# Patient Record
Sex: MALE | Race: WHITE | Employment: OTHER | URBAN - METROPOLITAN AREA
[De-identification: names, ages, dates, MRNs, and addresses within clinical notes are randomized per-mention and may not be internally consistent; named-entity substitution may affect disease eponyms.]

---

## 2017-02-08 DIAGNOSIS — E11.9 TYPE 2 DIABETES MELLITUS WITHOUT COMPLICATION (H): Primary | ICD-10-CM

## 2017-02-09 NOTE — TELEPHONE ENCOUNTER
METFORMIN 1000MG TABLETS         Last Written Prescription Date: 3/30/2016  Last Fill Quantity: 180, # refills: 1  Last Office Visit with [facility], [facility] or [facility] prescribing provider:  6/22/2016        BP Readings from Last 3 Encounters:   06/22/16 148/98   01/18/16 158/84   01/06/16 132/86     MICROL       15   6/22/2016  No results found for this basename: microalbumin  CREATININE   Date Value Ref Range Status   06/23/2016 1.10 0.66 - 1.25 mg/dL Final   ]  GFR ESTIMATE   Date Value Ref Range Status   06/23/2016 68 >60 mL/min/1.7m2 Final   01/04/2016 >90 >60 mL/min/1.7m2 Final   02/26/2015 >90  Non  GFR Calc   >60 mL/min/1.7m2 Final     GFR ESTIMATE IF BLACK   Date Value Ref Range Status   06/23/2016 82 >60 mL/min/1.7m2 Final   01/04/2016 >90 >60 mL/min/1.7m2 Final   02/26/2015 >90   GFR Calc   >60 mL/min/1.7m2 Final     CHOL      159   6/23/2016  HDL       44   6/23/2016  LDL       91   6/23/2016  LDL      102   1/7/2013  TRIG      119   6/23/2016  CHOLHDLRATIO      3.4   8/18/2014  AST       39   6/23/2016  ALT       45   6/23/2016  A1C      8.3   6/23/2016  A1C      8.4   1/4/2016  A1C      9.6   2/26/2015  A1C      8.1   8/18/2014  A1C      9.2   2/5/2014  POTASSIUM   Date Value Ref Range Status   06/23/2016 4.5 3.4 - 5.3 mmol/L Final

## 2017-02-13 ENCOUNTER — HOSPITAL ENCOUNTER (OUTPATIENT)
Dept: CARDIOLOGY | Facility: CLINIC | Age: 62
Discharge: HOME | End: 2017-02-13
Attending: NURSE PRACTITIONER | Admitting: NURSE PRACTITIONER
Payer: COMMERCIAL

## 2017-02-13 DIAGNOSIS — I48.91 ATRIAL FIBRILLATION WITH RVR (H): ICD-10-CM

## 2017-02-13 PROCEDURE — 25500064 ZZH RX 255 OP 636: Performed by: NURSE PRACTITIONER

## 2017-02-13 PROCEDURE — 93306 TTE W/DOPPLER COMPLETE: CPT | Mod: 26 | Performed by: INTERNAL MEDICINE

## 2017-02-13 PROCEDURE — 40000264 ECHO COMPLETE WITH LUMASON

## 2017-02-13 RX ADMIN — SULFUR HEXAFLUORIDE 5 ML: KIT at 16:49

## 2017-02-13 NOTE — TELEPHONE ENCOUNTER
Prescription approved per [facility] Refill Protocol.  Giuliana Jackson RN- Triage [employer]

## 2017-02-15 ENCOUNTER — OFFICE VISIT (OUTPATIENT)
Dept: CARDIOLOGY | Facility: CLINIC | Age: 62
End: 2017-02-15
Attending: NURSE PRACTITIONER
Payer: COMMERCIAL

## 2017-02-15 VITALS
WEIGHT: 315 LBS | BODY MASS INDEX: 46.65 KG/M2 | HEIGHT: 69 IN | DIASTOLIC BLOOD PRESSURE: 90 MMHG | SYSTOLIC BLOOD PRESSURE: 132 MMHG | HEART RATE: 86 BPM

## 2017-02-15 DIAGNOSIS — I48.91 ATRIAL FIBRILLATION WITH RVR (H): ICD-10-CM

## 2017-02-15 PROCEDURE — 99214 OFFICE O/P EST MOD 30 MIN: CPT | Mod: 25 | Performed by: INTERNAL MEDICINE

## 2017-02-15 PROCEDURE — 93000 ELECTROCARDIOGRAM COMPLETE: CPT | Performed by: INTERNAL MEDICINE

## 2017-02-15 NOTE — MR AVS SNAPSHOT
After Visit Summary   2/15/2017    Demetri Booth    MRN: [id number]           Patient Information     Date Of Birth          1955        Visit Information        Provider Department      2/15/2017 4:15 PM Chelsea Bird MD [facility] [place]        Today's Diagnoses     Atrial fibrillation with RVR           Follow-ups after your visit        Additional Services     Follow-Up with Cardiac Advanced Practice Provider                 Future tests that were ordered for you today     Open Future Orders        Priority Expected Expires Ordered    Follow-Up with Cardiac Advanced Practice Provider Routine 2/15/2018 6/30/2018 2/15/2017            Who to contact     If you have questions or need follow up information about today's clinic visit or your schedule please contact [facility] directly at [phone].  Normal or non-critical lab and imaging results will be communicated to you by [employer], letter or phone within 4 business days after the clinic has received the results. If you do not hear from us within 7 days, please contact the clinic through [employer] or phone. If you have a critical or abnormal lab result, we will notify you by phone as soon as possible.  Submit refill requests through [employer] or call your pharmacy and they will forward the refill request to us. Please allow 3 business days for your refill to be completed.          Additional Information About Your Visit        MyChart Information     [employer] gives you secure access to your electronic health record. If you see a primary care provider, you can also send messages to your care team and make appointments. If you have questions, please call your primary care clinic.  If you do not have a primary care provider, please call [phone] and they will assist you.        Care EveryWhere ID     This is your Care EveryWhere ID. This could be used by  "other organizations to access your [place] medical records  [id number]        Your Vitals Were     Pulse Height BMI (Body Mass Index)             86 1.74 m (5' 8.5\") 54.75 kg/m2          Blood Pressure from Last 3 Encounters:   02/15/17 132/90   06/22/16 (!) 148/98   01/18/16 158/84    Weight from Last 3 Encounters:   02/15/17 (!) 165.7 kg (365 lb 6.4 oz)   06/22/16 (!) 166.5 kg (367 lb)   01/18/16 (!) 166.5 kg (367 lb)              We Performed the Following     EKG 12-lead complete w/read - Clinics (to be scheduled)     Follow-Up with Electrophysiologist          Today's Medication Changes          These changes are accurate as of: 2/15/17  4:51 PM.  If you have any questions, ask your nurse or doctor.               Stop taking these medicines if you haven't already. Please contact your care team if you have questions.     vitamin D 2000 UNITS Caps   Stopped by:  Chelsea Bird MD                    Primary Care Provider Office Phone # Fax #    Cheko Osborn [phone]       [facility] [address] [facility] [id number]        Thank you!     Thank you for choosing [facility] [place]  for your care. Our goal is always to provide you with excellent care. Hearing back from our patients is one way we can continue to improve our services. Please take a few minutes to complete the written survey that you may receive in the mail after your visit with us. Thank you!             Your Updated Medication List - Protect others around you: Learn how to safely use, store and throw away your medicines at www.disposemymeds.org.          This list is accurate as of: 2/15/17  4:51 PM.  Always use your most recent med list.                   Brand Name Dispense Instructions for use    ACCU-CHEK SMARTVIEW test strip   Generic drug:  blood glucose monitoring      by In Vitro route daily Reported on 2/15/2017       apixaban ANTICOAGULANT 5 MG " tablet    ELIQUIS    180 tablet    Take 1 tablet (5 mg) by mouth 2 times daily       atorvastatin 20 MG tablet    LIPITOR     Take 10 mg by mouth daily       blood glucose monitoring lancets      1 each by In Vitro route daily Reported on 2/15/2017       clobetasol 0.05 % cream    TEMOVATE    30 g    APPLY TOPICALLY TWICE DAILY AS NEEDED FOR UP TO TWO WEEKS, THEN INTERMITTENLY.       glimepiride 4 MG tablet    AMARYL    90 tablet    TAKE 1 TABLET BY MOUTH EVERY MORNING BEFORE BREAKFAST       hydrocortisone 1 % ointment     30 g    Apply sparingly to affected area.       lisinopril 5 MG tablet    PRINIVIL/ZESTRIL    90 tablet    TAKE 1 TABLET BY MOUTH DAILY       metFORMIN 1000 MG tablet    GLUCOPHAGE    180 tablet    TAKE 1 TABLET BY MOUTH TWICE DAILY WITH MEALS       * metoprolol 100 MG tablet    LOPRESSOR    270 tablet    Take one pill in the AM and one pill in the PM       * metoprolol 100 MG tablet    LOPRESSOR    270 tablet    TAKE 1 1/2 TABLETS BY MOUTH TWICE DAILY       multivitamin, therapeutic with minerals Tabs tablet      Take 1 tablet by mouth daily.       order for DME      CPAP every night       sitagliptin 100 MG tablet    JANUVIA    90 tablet    Take 1 tablet (100 mg) by mouth daily       TAZORAC 0.05 % Crea cream   Generic drug:  tazarotene      Externally apply  topically daily as needed. Indications: Plaque Psoriasis       TYLENOL PO      Take 500 mg by mouth every 4 hours as needed       * Notice:  This list has 2 medication(s) that are the same as other medications prescribed for you. Read the directions carefully, and ask your doctor or other care provider to review them with you.

## 2017-02-15 NOTE — LETTER
2/15/2017    Cheko Osborn MD  [facility]   [address]  [place] [address]    RE: Demetri Booth       Dear Colleague,    I had the pleasure of seeing Mr. Demetri Booth in follow-up of permanent atrial fibrillation and cardiomyopathy.  He is a delightful 62-year-old gentleman with history of obstructive sleep apnea (treated with CPAP), morbid obesity, type 2 diabetes mellitus and hypertension.  His EF has been in the 45%-50% range.  For rate control of his atrial fibrillation he uses metoprolol 150 mg b.i.d.  He used to be on diltiazem which was stopped because of GI side effects.  Last year, the dose of metoprolol was decreased to 100 mg b.i.d. because the patient said he felt groggy on the higher dose.      Demetri had an uneventful past year.  No hospitalizations.  He had no recent hematuria.  He used to be on rivaroxaban but once this was switched to apixaban the hematuria episodes stopped.  His weight has remained stable and unfortunately, he has been unable to lose any weight.  He is unaware of atrial fibrillation.  He has had no chest pain.  He has had cold symptoms for the past week.      PHYSICAL EXAMINATION:   VITAL SIGNS:  Blood pressure 132/90, pulse 86 and irregular, weight 166 kilos (365 pounds), height is 174 cm.   GENERAL:  He is a very pleasant man accompanied by his wife.  He is severely overweight.   HEENT:  Normocephalic.   NECK:  Thick, supple, with no apparent jugular venous distention.   LUNGS:  Completely clear.   CARDIOVASCULAR:  Irregularly irregular rhythm, no apparent gallop, murmur.   ABDOMEN:  Severely obese.   EXTREMITIES:  With chronic venous stasis changes bilaterally.  There is trace to mild edema.     SKIN:  He has psoriatic plaques.      DIAGNOSTIC STUDIES:    His EKG today showed atrial fibrillation with ventricular rate in the 90s.      His recent echocardiogram from earlier this week showed EF of 50%-55% with normal LV size, mildly decreased RV function and  inadequately visualized atria.  There were no significant valvular abnormalities.  Estimated RVSP is 39 mmHg plus right atrial pressure.      I reviewed recent laboratory tests:  Sodium 137, creatinine 1.1, hemoglobin A1c 8.3, LDL 91 (on atorvastatin 10 mg daily).      Outpatient Encounter Prescriptions as of 2/15/2017   Medication Sig Dispense Refill     metFORMIN (GLUCOPHAGE) 1000 MG tablet TAKE 1 TABLET BY MOUTH TWICE DAILY WITH MEALS 180 tablet 0     glimepiride (AMARYL) 4 MG tablet TAKE 1 TABLET BY MOUTH EVERY MORNING BEFORE BREAKFAST 90 tablet 1     metoprolol (LOPRESSOR) 100 MG tablet TAKE 1 1/2 TABLETS BY MOUTH TWICE DAILY 270 tablet 0     lisinopril (PRINIVIL,ZESTRIL) 5 MG tablet TAKE 1 TABLET BY MOUTH DAILY 90 tablet 1     apixaban ANTICOAGULANT (ELIQUIS) 5 MG tablet Take 1 tablet (5 mg) by mouth 2 times daily 180 tablet 3     clobetasol (TEMOVATE) 0.05 % cream APPLY TOPICALLY TWICE DAILY AS NEEDED FOR UP TO TWO WEEKS, THEN INTERMITTENLY. 30 g 9     atorvastatin (LIPITOR) 20 MG tablet Take 10 mg by mouth daily       order for DME CPAP every night       metoprolol (LOPRESSOR) 100 MG tablet Take one pill in the AM and one pill in the  tablet 3     sitagliptin (JANUVIA) 100 MG tablet Take 1 tablet (100 mg) by mouth daily 90 tablet 1     Acetaminophen (TYLENOL PO) Take 500 mg by mouth every 4 hours as needed        hydrocortisone 1 % ointment Apply sparingly to affected area. 30 g 0     multivitamin, therapeutic with minerals (THERA-VIT-M) TABS Take 1 tablet by mouth daily.       Tazarotene (TAZORAC) 0.05 % CREA Externally apply  topically daily as needed. Indications: Plaque Psoriasis       blood glucose (ACCU-CHEK SMARTVIEW) test strip by In Vitro route daily Reported on 2/15/2017       blood glucose monitoring (ACCU-CHEK FASTCLIX) lancets 1 each by In Vitro route daily Reported on 2/15/2017       [DISCONTINUED] Cholecalciferol (VITAMIN D) 2000 UNITS CAPS Take 1 capsule by mouth daily Reported on  2/15/2017       No facility-administered encounter medications on file as of 2/15/2017.      IMPRESSION:   1.  Permanent atrial fibrillation.  Treated with rate control (metoprolol 100 mg b.i.d.) and apixaban.  He has tolerated apixaban without hematuria or other significant bleeding issues.  His current rate control is acceptable.  Since he is asymptomatic and his LV function is in the low-normal range, I will not increase his dose (he was noted to have RVR during his recent echocardiogram as well as during some of the clinic visits).  Again, he had some issues with the higher dose of metoprolol and he did not tolerate diltiazem because of GI side effects.   2.  Mild cardiomyopathy.  His EF is low normal and actually better than previously which is good news.  I note that his echocardiograms are always somewhat technically difficult studies.   3.  Obstructive sleep apnea.  He was congratulated upon the regular use of CPAP.   4.  Hypertension, under good control.   5.  Lower extremity edema due to venous stasis.  He was encouraged to use support stockings.      RECOMMENDATIONS:  Continue same medications and followup in the clinic in 1 year.      It was my pleasure seeing Demetri today.  Time spent in clinic was 25 minutes with greater than 50% of the time devoted to discussion.     Sincerely,    Chelsea Bird MD     [facility]
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
## 2017-02-16 NOTE — PROGRESS NOTES
HISTORY OF PRESENT ILLNESS:    I had the pleasure of seeing Mr. Demetri Booth in follow-up of permanent atrial fibrillation and cardiomyopathy.  He is a delightful 62-year-old gentleman with history of obstructive sleep apnea (treated with CPAP), morbid obesity, type 2 diabetes mellitus and hypertension.  His EF has been in the 45%-50% range.  For rate control of his atrial fibrillation he uses metoprolol 150 mg b.i.d.  He used to be on diltiazem which was stopped because of GI side effects.  Last year, the dose of metoprolol was decreased to 100 mg b.i.d. because the patient said he felt groggy on the higher dose.      Demetri had an uneventful past year.  No hospitalizations.  He had no recent hematuria.  He used to be on rivaroxaban but once this was switched to apixaban the hematuria episodes stopped.  His weight has remained stable and unfortunately, he has been unable to lose any weight.  He is unaware of atrial fibrillation.  He has had no chest pain.  He has had cold symptoms for the past week.      PHYSICAL EXAMINATION:   VITAL SIGNS:  Blood pressure 132/90, pulse 86 and irregular, weight 166 kilos (365 pounds), height is 174 cm.   GENERAL:  He is a very pleasant man accompanied by his wife.  He is severely overweight.   HEENT:  Normocephalic.   NECK:  Thick, supple, with no apparent jugular venous distention.   LUNGS:  Completely clear.   CARDIOVASCULAR:  Irregularly irregular rhythm, no apparent gallop, murmur.   ABDOMEN:  Severely obese.   EXTREMITIES:  With chronic venous stasis changes bilaterally.  There is trace to mild edema.     SKIN:  He has psoriatic plaques.      DIAGNOSTIC STUDIES:    His EKG today showed atrial fibrillation with ventricular rate in the 90s.      His recent echocardiogram from earlier this week showed EF of 50%-55% with normal LV size, mildly decreased RV function and inadequately visualized atria.  There were no significant valvular abnormalities.  Estimated RVSP is 39 mmHg plus  right atrial pressure.      I reviewed recent laboratory tests:  Sodium 137, creatinine 1.1, hemoglobin A1c 8.3, LDL 91 (on atorvastatin 10 mg daily).      IMPRESSION:   1.  Permanent atrial fibrillation.  Treated with rate control (metoprolol 100 mg b.i.d.) and apixaban.  He has tolerated apixaban without hematuria or other significant bleeding issues.  His current rate control is acceptable.  Since he is asymptomatic and his LV function is in the low-normal range, I will not increase his dose (he was noted to have RVR during his recent echocardiogram as well as during some of the clinic visits).  Again, he had some issues with the higher dose of metoprolol and he did not tolerate diltiazem because of GI side effects.   2.  Mild cardiomyopathy.  His EF is low normal and actually better than previously which is good news.  I note that his echocardiograms are always somewhat technically difficult studies.   3.  Obstructive sleep apnea.  He was congratulated upon the regular use of CPAP.   4.  Hypertension, under good control.   5.  Lower extremity edema due to venous stasis.  He was encouraged to use support stockings.      RECOMMENDATIONS:  Continue same medications and followup in the clinic in 1 year.      It was my pleasure seeing Demetri today.  Time spent in clinic was 25 minutes with greater than 50% of the time devoted to discussion.        LITTLE AUGUST MD, [facility]       cc:   [place] [address]             D: 02/15/2017 16:50   T: 2017 06:37   MT: ROBINSON      Name:     DEMETRI MOONEY   MRN:      [id number]        Account:      [id number]   :      1955           Service Date: 02/15/2017      Document: [id number]

## 2017-04-07 ENCOUNTER — TELEPHONE (OUTPATIENT)
Dept: FAMILY MEDICINE | Facility: CLINIC | Age: 62
End: 2017-04-07

## 2017-04-07 NOTE — TELEPHONE ENCOUNTER
Pt's wife was called and advised to schedule OV . Pt is due for a diabetic check he may discuss order at OV. Wife was agreeable with plan. Appt was scheduled.

## 2017-04-07 NOTE — TELEPHONE ENCOUNTER
Reason for Call:  Other call back    Detailed comments: Patient's spouse is calling as her and her spouse are going to [place] this summer and needa traveling c-pap machine. Patient's spouse states they need a prescription. Please call to discuss    Phone Number Patient can be reached at: Cell number on file:    Telephone Information:   Mobile [phone]       Best Time: Any    Can we leave a detailed message on this number? YES    Call taken on 4/7/2017 at 1:59 PM by LENORA GALINDO

## 2017-04-12 ENCOUNTER — OFFICE VISIT (OUTPATIENT)
Dept: FAMILY MEDICINE | Facility: CLINIC | Age: 62
End: 2017-04-12
Payer: COMMERCIAL

## 2017-04-12 VITALS
WEIGHT: 315 LBS | SYSTOLIC BLOOD PRESSURE: 131 MMHG | RESPIRATION RATE: 18 BRPM | TEMPERATURE: 97.3 F | OXYGEN SATURATION: 98 % | BODY MASS INDEX: 46.65 KG/M2 | DIASTOLIC BLOOD PRESSURE: 84 MMHG | HEIGHT: 69 IN | HEART RATE: 95 BPM

## 2017-04-12 DIAGNOSIS — E11.9 TYPE 2 DIABETES MELLITUS WITHOUT COMPLICATION, WITHOUT LONG-TERM CURRENT USE OF INSULIN (H): Primary | ICD-10-CM

## 2017-04-12 DIAGNOSIS — I10 ESSENTIAL HYPERTENSION, BENIGN: ICD-10-CM

## 2017-04-12 DIAGNOSIS — Z13.89 SCREENING FOR DIABETIC PERIPHERAL NEUROPATHY: ICD-10-CM

## 2017-04-12 DIAGNOSIS — G47.30 SLEEP APNEA, UNSPECIFIED TYPE: ICD-10-CM

## 2017-04-12 DIAGNOSIS — E66.01 MORBID OBESITY DUE TO EXCESS CALORIES (H): ICD-10-CM

## 2017-04-12 DIAGNOSIS — Z11.59 NEED FOR HEPATITIS C SCREENING TEST: ICD-10-CM

## 2017-04-12 DIAGNOSIS — L40.9 PSORIASIS OF SCALP: ICD-10-CM

## 2017-04-12 DIAGNOSIS — E78.00 HYPERCHOLESTEROLEMIA: ICD-10-CM

## 2017-04-12 LAB
ALT SERPL W P-5'-P-CCNC: 35 U/L (ref 0–70)
ANION GAP SERPL CALCULATED.3IONS-SCNC: 7 MMOL/L (ref 3–14)
AST SERPL W P-5'-P-CCNC: 35 U/L (ref 0–45)
BUN SERPL-MCNC: 9 MG/DL (ref 7–30)
CALCIUM SERPL-MCNC: 9.1 MG/DL (ref 8.5–10.1)
CHLORIDE SERPL-SCNC: 102 MMOL/L (ref 94–109)
CHOLEST SERPL-MCNC: 151 MG/DL
CO2 SERPL-SCNC: 29 MMOL/L (ref 20–32)
CREAT SERPL-MCNC: 0.91 MG/DL (ref 0.66–1.25)
CREAT UR-MCNC: 131 MG/DL
GFR SERPL CREATININE-BSD FRML MDRD: 84 ML/MIN/1.7M2
GLUCOSE SERPL-MCNC: 148 MG/DL (ref 70–99)
HBA1C MFR BLD: 7 % (ref 4.3–6)
HDLC SERPL-MCNC: 52 MG/DL
LDLC SERPL CALC-MCNC: 82 MG/DL
MICROALBUMIN UR-MCNC: 11 MG/L
MICROALBUMIN/CREAT UR: 8.63 MG/G CR (ref 0–17)
NONHDLC SERPL-MCNC: 99 MG/DL
POTASSIUM SERPL-SCNC: 4.3 MMOL/L (ref 3.4–5.3)
SODIUM SERPL-SCNC: 138 MMOL/L (ref 133–144)
TRIGL SERPL-MCNC: 85 MG/DL

## 2017-04-12 PROCEDURE — 99207 C FOOT EXAM  NO CHARGE: CPT | Performed by: FAMILY MEDICINE

## 2017-04-12 PROCEDURE — 86803 HEPATITIS C AB TEST: CPT | Performed by: FAMILY MEDICINE

## 2017-04-12 PROCEDURE — 87522 HEPATITIS C REVRS TRNSCRPJ: CPT | Performed by: FAMILY MEDICINE

## 2017-04-12 PROCEDURE — 82043 UR ALBUMIN QUANTITATIVE: CPT | Performed by: FAMILY MEDICINE

## 2017-04-12 PROCEDURE — 80048 BASIC METABOLIC PNL TOTAL CA: CPT | Performed by: FAMILY MEDICINE

## 2017-04-12 PROCEDURE — 84460 ALANINE AMINO (ALT) (SGPT): CPT | Performed by: FAMILY MEDICINE

## 2017-04-12 PROCEDURE — 80061 LIPID PANEL: CPT | Performed by: FAMILY MEDICINE

## 2017-04-12 PROCEDURE — 36415 COLL VENOUS BLD VENIPUNCTURE: CPT | Performed by: FAMILY MEDICINE

## 2017-04-12 PROCEDURE — 83036 HEMOGLOBIN GLYCOSYLATED A1C: CPT | Performed by: FAMILY MEDICINE

## 2017-04-12 PROCEDURE — 84450 TRANSFERASE (AST) (SGOT): CPT | Performed by: FAMILY MEDICINE

## 2017-04-12 PROCEDURE — 99214 OFFICE O/P EST MOD 30 MIN: CPT | Mod: 25 | Performed by: FAMILY MEDICINE

## 2017-04-12 RX ORDER — CLOBETASOL PROPIONATE 0.5 MG/G
CREAM TOPICAL
Qty: 30 G | Refills: 3 | Status: SHIPPED | OUTPATIENT
Start: 2017-04-12 | End: 2017-12-22

## 2017-04-12 NOTE — MR AVS SNAPSHOT
After Visit Summary   4/12/2017    Demetri Booth    MRN: [id number]           Patient Information     Date Of Birth          1955        Visit Information        Provider Department      4/12/2017 7:15 AM Cheko Osborn MD [facility]        Today's Diagnoses     Type 2 diabetes mellitus without complication, without long-term current use of insulin (H)    -  1    Hypercholesterolemia        Morbid obesity due to excess calories (H)        Essential hypertension, benign        Need for hepatitis C screening test        Screening for diabetic peripheral neuropathy        Psoriasis of scalp        Sleep apnea, unspecified type          Care Instructions    Keep working on diet, working to lose weight        Follow-ups after your visit        Follow-up notes from your care team     Return in about 3 months (around 7/12/2017).      Who to contact     If you have questions or need follow up information about today's clinic visit or your schedule please contact [facility] directly at [phone].  Normal or non-critical lab and imaging results will be communicated to you by MyChart, letter or phone within 4 business days after the clinic has received the results. If you do not hear from us within 7 days, please contact the clinic through [employer] or phone. If you have a critical or abnormal lab result, we will notify you by phone as soon as possible.  Submit refill requests through [employer] or call your pharmacy and they will forward the refill request to us. Please allow 3 business days for your refill to be completed.          Additional Information About Your Visit        MyChart Information     C[employer]le gives you secure access to your electronic health record. If you see a primary care provider, you can also send messages to your care team and make appointments. If you have questions, please call your primary care clinic.  If you do  "not have a primary care provider, please call [phone] and they will assist you.        Care EveryWhere ID     This is your Care EveryWhere ID. This could be used by other organizations to access your [facility] medical records  [id number]        Your Vitals Were     Pulse Temperature Respirations Height Pulse Oximetry BMI (Body Mass Index)    95 97.3  F (36.3  C) (Oral) 18 5' 8.5\" (1.74 m) 98% 53.94 kg/m2       Blood Pressure from Last 3 Encounters:   04/12/17 131/84   02/15/17 132/90   06/22/16 (!) 148/98    Weight from Last 3 Encounters:   04/12/17 (!) 360 lb (163.3 kg)   02/15/17 (!) 365 lb 6.4 oz (165.7 kg)   06/22/16 (!) 367 lb (166.5 kg)              We Performed the Following     Albumin Random Urine Quantitative     ALT     AST     Basic metabolic panel     FOOT EXAM  NO CHARGE [97217.114]     HEMOGLOBIN A1C     Hepatitis C Screen Reflex to HCV RNA Quant and Genotype     Lipid panel reflex to direct LDL          Today's Medication Changes          These changes are accurate as of: 4/12/17  7:35 AM.  If you have any questions, ask your nurse or doctor.               These medicines have changed or have updated prescriptions.        Dose/Directions    clobetasol 0.05 % cream   Commonly known as:  TEMOVATE   This may have changed:  See the new instructions.   Used for:  Psoriasis of scalp   Changed by:  Cheko Osborn MD        APPLY TOPICALLY TWICE DAILY AS NEEDED FOR UP TO TWO WEEKS, THEN INTERMITTENLY.   Quantity:  30 g   Refills:  3       order for DME   This may have changed:  additional instructions   Used for:  Sleep apnea, unspecified type   Changed by:  Cheko Osborn MD        CPAP every night  Needs small travel sized CPAP machine   Quantity:  1 Device   Refills:  0            Where to get your medicines      These medications were sent to [facility] [id number] [address] AT [address] [address]     Phone:  [phone]     " clobetasol 0.05 % cream         Some of these will need a paper prescription and others can be bought over the counter.  Ask your nurse if you have questions.     Bring a paper prescription for each of these medications     order for DME                Primary Care Provider Office Phone # Fax #    Cheko Osborn [phone]       [facility] [address]  [facility] [address]        Thank you!     Thank you for choosing [facility]  for your care. Our goal is always to provide you with excellent care. Hearing back from our patients is one way we can continue to improve our services. Please take a few minutes to complete the written survey that you may receive in the mail after your visit with us. Thank you!             Your Updated Medication List - Protect others around you: Learn how to safely use, store and throw away your medicines at www.disposemymeds.org.          This list is accurate as of: 4/12/17  7:35 AM.  Always use your most recent med list.                   Brand Name Dispense Instructions for use    ACCU-CHEK SMARTVIEW test strip   Generic drug:  blood glucose monitoring      by In Vitro route daily Reported on 2/15/2017       apixaban ANTICOAGULANT 5 MG tablet    ELIQUIS    180 tablet    Take 1 tablet (5 mg) by mouth 2 times daily       atorvastatin 20 MG tablet    LIPITOR     Take 10 mg by mouth daily       blood glucose monitoring lancets      1 each by In Vitro route daily Reported on 2/15/2017       clobetasol 0.05 % cream    TEMOVATE    30 g    APPLY TOPICALLY TWICE DAILY AS NEEDED FOR UP TO TWO WEEKS, THEN INTERMITTENLY.       glimepiride 4 MG tablet    AMARYL    90 tablet    TAKE 1 TABLET BY MOUTH EVERY MORNING BEFORE BREAKFAST       hydrocortisone 1 % ointment     30 g    Apply sparingly to affected area.       lisinopril 5 MG tablet    PRINIVIL/ZESTRIL    90 tablet    TAKE 1 TABLET BY MOUTH DAILY       metFORMIN 1000 MG  tablet    GLUCOPHAGE    180 tablet    TAKE 1 TABLET BY MOUTH TWICE DAILY WITH MEALS       * metoprolol 100 MG tablet    LOPRESSOR    270 tablet    Take one pill in the AM and one pill in the PM       * metoprolol 100 MG tablet    LOPRESSOR    270 tablet    TAKE 1 1/2 TABLETS BY MOUTH TWICE DAILY       multivitamin, therapeutic with minerals Tabs tablet      Take 1 tablet by mouth daily.       order for DME     1 Device    CPAP every night  Needs small travel sized CPAP machine       sitagliptin 100 MG tablet    JANUVIA    90 tablet    Take 1 tablet (100 mg) by mouth daily       TAZORAC 0.05 % Crea cream   Generic drug:  tazarotene      Externally apply  topically daily as needed. Indications: Plaque Psoriasis       TYLENOL PO      Take 500 mg by mouth every 4 hours as needed       * Notice:  This list has 2 medication(s) that are the same as other medications prescribed for you. Read the directions carefully, and ask your doctor or other care provider to review them with you.

## 2017-04-12 NOTE — PROGRESS NOTES
"  SUBJECTIVE:                                                    Demetri Booth is a 62 year old male who presents to clinic today for the following health issues:    Diabetes Follow-up      Patient is checking blood sugars: rarely.  Results range from 150 to 160    Diabetic concerns: None     Symptoms of hypoglycemia (low blood sugar): none     Paresthesias (numbness or burning in feet) or sores: No     Date of last diabetic eye exam: NA       Amount of exercise or physical activity: None    Problems taking medications regularly: No    Medication side effects: none    Diet: diabetic, low salt, low cholesterol      Hyperlipidemia Follow-Up      Rate your low fat/cholesterol diet?: good    Taking statin?  Yes, no muscle aches from statin    Other lipid medications/supplements?:  none     Hypertension Follow-up      Outpatient blood pressures are not being checked.    Low Salt Diet: no added salt       Pt needs Rx for travel sized CPAP machine.  He is traveling to [place]     Problem list and histories reviewed & adjusted, as indicated.  Additional history: as documented    Labs reviewed in EPIC    Reviewed and updated as needed this visit by clinical staff  Tobacco  Allergies  Meds  Med Hx  Surg Hx  Fam Hx  Soc Hx      Reviewed and updated as needed this visit by Provider         ROS:  CONSTITUTIONAL:NEGATIVE for fever, chills, change in weight  INTEGUMENTARY/SKIN: rash scattered  RESP:NEGATIVE for significant cough or SOB  CV: POSITIVE for irregular heart beat  GI: NEGATIVE for nausea, abdominal pain, heartburn, or change in bowel habits  NEURO: NEGATIVE for weakness, dizziness or paresthesias  ENDOCRINE: NEGATIVE for temperature intolerance, skin/hair changes and POSITIVE  for HX diabetes    OBJECTIVE:                                                    /84 (BP Location: Left arm, Patient Position: Chair, Cuff Size: Adult Large)  Pulse 95  Temp 97.3  F (36.3  C) (Oral)  Resp 18  Ht 5' 8.5\" (1.74 m)  Wt " (!) 360 lb (163.3 kg)  SpO2 98%  BMI 53.94 kg/m2  Body mass index is 53.94 kg/(m^2).  GENERAL APPEARANCE: healthy, alert and no distress  NECK: no adenopathy, no asymmetry, masses, or scars, thyroid normal to palpation and no bruits  RESP: lungs clear to auscultation - no rales, rhonchi or wheezes  CV: normal S1 S2, no S3 or S4, no murmur, click or rub and irregularly irregular rhythm  ABDOMEN: soft, nontender, without hepatosplenomegaly or masses and bowel sounds normal  MS: extremities normal- no gross deformities noted  DIABETIC FOOT EXAM: normal DP and PT pulses, no trophic changes or ulcerative lesions, normal sensory exam and normal monofilament exam    Diagnostic test results:  Lab: see below, results pending     ASSESSMENT/PLAN:                                                        ICD-10-CM    1. Type 2 diabetes mellitus without complication, without long-term current use of insulin (H) E11.9 HEMOGLOBIN A1C     Basic metabolic panel     Albumin Random Urine Quantitative   2. Hypercholesterolemia E78.00 Lipid panel reflex to direct LDL     ALT     AST   3. Morbid obesity due to excess calories (H) E66.01    4. Essential hypertension, benign I10    5. Need for hepatitis C screening test Z11.59 Hepatitis C Screen Reflex to HCV RNA Quant and Genotype   6. Screening for diabetic peripheral neuropathy Z13.89 FOOT EXAM  NO CHARGE [83950.114]   7. Psoriasis of scalp L40.9 clobetasol (TEMOVATE) 0.05 % cream   8. Sleep apnea, unspecified type G47.30 order for DME       Follow up with Provider - 3 mo   Patient Instructions   Keep working on diet, working to lose weight      Cheko Osborn MD  [facility]

## 2017-04-12 NOTE — NURSING NOTE
"Chief Complaint   Patient presents with     Recheck Medication     /84 (BP Location: Left arm, Patient Position: Chair, Cuff Size: Adult Large)  Pulse 95  Temp 97.3  F (36.3  C) (Oral)  Resp 18  Ht 5' 8.5\" (1.74 m)  Wt (!) 360 lb (163.3 kg)  SpO2 98%  BMI 53.94 kg/m2 Estimated body mass index is 53.94 kg/(m^2) as calculated from the following:    Height as of this encounter: 5' 8.5\" (1.74 m).    Weight as of this encounter: 360 lb (163.3 kg).  BP completed using cuff size: large   Tiffanie Chua CMA    Health Maintenance Due   Topic Date Due     HEPATITIS C SCREENING  02/17/1972     FOOT EXAM Q1 YEAR( NO INBASKET)  02/05/2015     EYE EXAM Q1 YEAR( NO INBASKET)  02/05/2015     A1C Q6 MO( NO INBASKET)  12/23/2016     Health Maintenance reviewed at today's visit patient asked to schedule/complete:   Diabetes:  Patient agrees to schedule    "

## 2017-04-13 LAB
HCV AB SERPL QL IA: ABNORMAL
HCV RNA SERPL NAA+PROBE-ACNC: NORMAL [IU]/ML
HCV RNA SERPL NAA+PROBE-LOG IU: NORMAL LOG IU/ML

## 2017-04-26 DIAGNOSIS — I48.91 ATRIAL FIBRILLATION WITH RVR (H): ICD-10-CM

## 2017-05-07 DIAGNOSIS — E11.9 TYPE 2 DIABETES MELLITUS WITHOUT COMPLICATION, WITHOUT LONG-TERM CURRENT USE OF INSULIN (H): Primary | ICD-10-CM

## 2017-07-12 DIAGNOSIS — I10 HYPERTENSION GOAL BP (BLOOD PRESSURE) < 130/80: ICD-10-CM

## 2017-07-12 RX ORDER — LISINOPRIL 5 MG/1
5 TABLET ORAL DAILY
Qty: 90 TABLET | Refills: 2 | Status: SHIPPED | OUTPATIENT
Start: 2017-07-12 | End: 2017-12-06

## 2017-07-12 NOTE — TELEPHONE ENCOUNTER
Reason for Call:  Medication or medication refill:    Do you use a [facility]?  Name of the pharmacy and phone number for the current request:  [facility]    Name of the medication requested: Lisinipril    Other request: Pt is out.   Says pharmacy was suppose to contact us.     Can we leave a detailed message on this number? YES    Phone number patient can be reached at: Home number on file [phone] (home)    Best Time: any    Call taken on 7/12/2017 at 9:17 AM by MARU CEDILLO

## 2017-07-12 NOTE — TELEPHONE ENCOUNTER
Lisinopril  Last Written Prescription Date: 6-  Last Fill Quantity: 90, # refills: 1  Last Office Visit with [facility], [facility] or [facility] prescribing provider: 4-12-17       Potassium   Date Value Ref Range Status   04/12/2017 4.3 3.4 - 5.3 mmol/L Final     Creatinine   Date Value Ref Range Status   04/12/2017 0.91 0.66 - 1.25 mg/dL Final     BP Readings from Last 3 Encounters:   04/12/17 131/84   02/15/17 132/90   06/22/16 (!) 148/98     Prescription approved per [facility] Refill Protocol.

## 2017-09-22 ENCOUNTER — TELEPHONE (OUTPATIENT)
Dept: FAMILY MEDICINE | Facility: CLINIC | Age: 62
End: 2017-09-22

## 2017-09-22 NOTE — LETTER
September 27, 2017    Demetri Booth  [address]  [address]  [place] [address]    Dear Johnny Mosquera cares about your health and your health plan.  I have reviewed your medical conditions, medication list and lab results, and am making recommendations based on this review to better manage your health.    You are in particular need of attention regarding:  -Cholesterol  -Diabetes  -High Blood Pressure  -Wellness (Physical) Visit     I am recommending that you:     -schedule a WELLNESS (Physical) APPOINTMENT with me.   I will check fasting labs the same day - nothing to eat except water and meds for 8-10 hours prior.      Please call us at the [place] location:  [phone] or use [employer] You to address the above recommendations.     Thank you for trusting [facility].  We appreciate the opportunity to serve you and look forward to supporting your healthcare in the future.    If you have (or plan to have) any of these tests done at a facility other than a [facility] or a [facility], please have the results sent to the [facility] location noted above.      Best Regards,    Cheko Osborn MD

## 2017-09-22 NOTE — TELEPHONE ENCOUNTER
Panel Management Review      Patient has the following on his problem list:     Diabetes    ASA: Failed    Last A1C  Lab Results   Component Value Date    A1C 7.0 04/12/2017    A1C 8.3 06/23/2016    A1C 8.4 01/04/2016    A1C 9.6 02/26/2015    A1C 8.1 08/18/2014     A1C tested: FAILED    Last LDL:    Lab Results   Component Value Date    CHOL 151 04/12/2017     Lab Results   Component Value Date    HDL 52 04/12/2017     Lab Results   Component Value Date    LDL 82 04/12/2017     Lab Results   Component Value Date    TRIG 85 04/12/2017     Lab Results   Component Value Date    CHOLHDLRATIO 3.4 08/18/2014     Lab Results   Component Value Date    NHDL 99 04/12/2017       Is the patient on a Statin? YES             Is the patient on Aspirin? NO    Medications     HMG CoA Reductase Inhibitors    atorvastatin (LIPITOR) 20 MG tablet          Last three blood pressure readings:  BP Readings from Last 3 Encounters:   04/12/17 131/84   02/15/17 132/90   06/22/16 (!) 148/98       Date of last diabetes office visit: 4/12/17     Tobacco History:     History   Smoking Status     Former Smoker     Quit date: 9/11/1980   Smokeless Tobacco     Never Used         Hypertension   Last three blood pressure readings:  BP Readings from Last 3 Encounters:   04/12/17 131/84   02/15/17 132/90   06/22/16 (!) 148/98     Blood pressure: FAILED    HTN Guidelines:  Age 18-59 BP range:  Less than 140/90  Age 60-85 with Diabetes:  Less than 140/90  Age 60-85 without Diabetes:  less than 150/90          Composite cancer screening  Chart review shows that this patient is due/due soon for the following None  Summary:    Patient is due/failing the following:   A1C, BP CHECK, FOLLOW UP and PHYSICAL    Action needed:   Patient needs office visit for follow up meds, DM/HTN/Lipids follow up.    Type of outreach:    Sent [employer] message. and Sent letter.    Questions for provider review:    None                                                                                                                                     Princess LINDSEY Betts, [facility]       Chart routed to none .

## 2017-10-12 RX ORDER — SITAGLIPTIN 100 MG/1
TABLET, FILM COATED ORAL
Qty: 90 TABLET | Refills: 0 | Status: SHIPPED | OUTPATIENT
Start: 2017-10-12 | End: 2017-12-06

## 2017-10-12 NOTE — TELEPHONE ENCOUNTER
Medication is being filled for 1 time refill only due to:  Patient needs labs due for follow up A1C.

## 2017-11-19 DIAGNOSIS — E11.9 TYPE 2 DIABETES MELLITUS WITHOUT COMPLICATION, WITHOUT LONG-TERM CURRENT USE OF INSULIN (H): ICD-10-CM

## 2017-11-21 NOTE — TELEPHONE ENCOUNTER
4-12-17 OV  Requested Prescriptions   Pending Prescriptions Disp Refills     metFORMIN (GLUCOPHAGE) 1000 MG tablet [Pharmacy Med Name: METFORMIN 1000MG TABLETS] 180 tablet 0     Sig: TAKE 1 TABLET BY MOUTH TWICE DAILY WITH MEALS    Biguanide Agents Failed    11/19/2017 11:00 PM       Failed - Patient's BP is less than 140/90    BP Readings from Last 3 Encounters:   04/12/17 131/84   02/15/17 132/90   06/22/16 (!) 148/98                Failed - Patient has documented A1c within the specified period of time.    Recent Labs   Lab Test  04/12/17   0740   A1C  7.0*            Failed - Patient does NOT have a diagnosis of CHF.       Failed - Recent (6 mos) or future visit with authorizing provider's specialty    Patient had office visit in the last 6 months or has a visit in the next 30 days with authorizing provider.  See chart review.            Passed - Patient has documented LDL within the past 12 mos.    Recent Labs   Lab Test  04/12/17   0740   LDL  82            Passed - Patient has had a Microalbumin in the past 12 mos.    Recent Labs   Lab Test  04/12/17   0755   02/04/11   1539   [id number]   --    --   5.0   [id number]   --    --   9.9   MICROL  11   < >   --    UMALCR  8.63   < >   --     < > = values in this interval not displayed.            Passed - Patient is age 10 or older       Passed - Patient's CR is NOT>1.4 OR Patient's EGFR is NOT<45 within past 12 mos.    Recent Labs   Lab Test  04/12/17   0740   GFRESTIMATED  84   GFRESTBLACK  >90   GFR Calc         Recent Labs   Lab Test  04/12/17   0740   CR  0.91           Prescription approved per [facility] Refill Protocol.

## 2017-11-24 DIAGNOSIS — E11.9 TYPE 2 DIABETES MELLITUS WITHOUT COMPLICATION, WITHOUT LONG-TERM CURRENT USE OF INSULIN (H): ICD-10-CM

## 2017-11-24 DIAGNOSIS — E11.9 TYPE 2 DIABETES MELLITUS WITHOUT COMPLICATION (H): ICD-10-CM

## 2017-11-24 RX ORDER — GLIMEPIRIDE 4 MG/1
TABLET ORAL
Qty: 30 TABLET | Refills: 0 | Status: SHIPPED | OUTPATIENT
Start: 2017-11-24 | End: 2017-11-24

## 2017-11-24 RX ORDER — GLIMEPIRIDE 4 MG/1
TABLET ORAL
Qty: 90 TABLET | Refills: 0 | Status: SHIPPED | OUTPATIENT
Start: 2017-11-24 | End: 2017-12-06

## 2017-11-24 NOTE — TELEPHONE ENCOUNTER
Last OV 04/12/2017.  Requested Prescriptions   Pending Prescriptions Disp Refills     glimepiride (AMARYL) 4 MG tablet [Pharmacy Med Name: GLIMEPIRIDE 4MG TABLETS] 90 tablet 0     Sig: TAKE 1 TABLET BY MOUTH EVERY MORNING BEFORE BREAKFAST    Sulfonylurea Agents Failed    11/24/2017 11:32 AM       Failed - Patient's BP is less than 140/90    BP Readings from Last 3 Encounters:   04/12/17 131/84   02/15/17 132/90   06/22/16 (!) 148/98                Failed - Patient has documented A1c within the specified period of time.    Recent Labs   Lab Test  04/12/17   0740   A1C  7.0*            Failed - Patient has had an appointment with authorizing provider within the past 6 mos. or  within next 30 days    Patient had office visit in the last 6 months or has a visit in the next 30 days with authorizing provider.  See chart review.            Passed - Patient has documented LDL within the past 12 mos.    Recent Labs   Lab Test  04/12/17   0740   LDL  82            Passed - Patient has had a Microalbumin in the past 12 mos.    Recent Labs   Lab Test  04/12/17   0755   02/04/11   1539   [id number]   --    --   5.0   [id number]   --    --   9.9   MICROL  11   < >   --    UMALCR  8.63   < >   --     < > = values in this interval not displayed.            Passed - Patient is age 18 or older       Passed - Patient has a recent creatinine (normal) within the past 12 mos.    Recent Labs   Lab Test  04/12/17   0740   CR  0.91

## 2017-12-06 ENCOUNTER — OFFICE VISIT (OUTPATIENT)
Dept: FAMILY MEDICINE | Facility: CLINIC | Age: 62
End: 2017-12-06
Payer: COMMERCIAL

## 2017-12-06 VITALS
BODY MASS INDEX: 54.39 KG/M2 | OXYGEN SATURATION: 97 % | RESPIRATION RATE: 20 BRPM | WEIGHT: 315 LBS | HEART RATE: 95 BPM | TEMPERATURE: 98 F | SYSTOLIC BLOOD PRESSURE: 138 MMHG | DIASTOLIC BLOOD PRESSURE: 88 MMHG

## 2017-12-06 DIAGNOSIS — E11.9 TYPE 2 DIABETES MELLITUS WITHOUT COMPLICATION, WITHOUT LONG-TERM CURRENT USE OF INSULIN (H): Primary | ICD-10-CM

## 2017-12-06 DIAGNOSIS — I10 ESSENTIAL HYPERTENSION, BENIGN: ICD-10-CM

## 2017-12-06 DIAGNOSIS — Z23 ENCOUNTER FOR IMMUNIZATION: ICD-10-CM

## 2017-12-06 DIAGNOSIS — I10 HYPERTENSION GOAL BP (BLOOD PRESSURE) < 130/80: ICD-10-CM

## 2017-12-06 DIAGNOSIS — E78.00 HYPERCHOLESTEROLEMIA: ICD-10-CM

## 2017-12-06 LAB — HBA1C MFR BLD: 6.8 % (ref 4.3–6)

## 2017-12-06 PROCEDURE — 84450 TRANSFERASE (AST) (SGOT): CPT | Performed by: FAMILY MEDICINE

## 2017-12-06 PROCEDURE — 80048 BASIC METABOLIC PNL TOTAL CA: CPT | Performed by: FAMILY MEDICINE

## 2017-12-06 PROCEDURE — 80061 LIPID PANEL: CPT | Performed by: FAMILY MEDICINE

## 2017-12-06 PROCEDURE — 82043 UR ALBUMIN QUANTITATIVE: CPT | Performed by: FAMILY MEDICINE

## 2017-12-06 PROCEDURE — 84460 ALANINE AMINO (ALT) (SGPT): CPT | Performed by: FAMILY MEDICINE

## 2017-12-06 PROCEDURE — 90471 IMMUNIZATION ADMIN: CPT | Performed by: FAMILY MEDICINE

## 2017-12-06 PROCEDURE — 36415 COLL VENOUS BLD VENIPUNCTURE: CPT | Performed by: FAMILY MEDICINE

## 2017-12-06 PROCEDURE — 99214 OFFICE O/P EST MOD 30 MIN: CPT | Mod: 25 | Performed by: FAMILY MEDICINE

## 2017-12-06 PROCEDURE — 83036 HEMOGLOBIN GLYCOSYLATED A1C: CPT | Performed by: FAMILY MEDICINE

## 2017-12-06 PROCEDURE — 90686 IIV4 VACC NO PRSV 0.5 ML IM: CPT | Performed by: FAMILY MEDICINE

## 2017-12-06 PROCEDURE — 84443 ASSAY THYROID STIM HORMONE: CPT | Performed by: FAMILY MEDICINE

## 2017-12-06 RX ORDER — GLIMEPIRIDE 4 MG/1
TABLET ORAL
Qty: 90 TABLET | Refills: 1 | Status: SHIPPED | OUTPATIENT
Start: 2017-12-06 | End: 2019-02-08

## 2017-12-06 RX ORDER — LISINOPRIL 5 MG/1
5 TABLET ORAL DAILY
Qty: 90 TABLET | Refills: 2 | Status: SHIPPED | OUTPATIENT
Start: 2017-12-06 | End: 2018-02-23

## 2017-12-06 RX ORDER — ERYTHROMYCIN 5 MG/G
1 OINTMENT OPHTHALMIC AT BEDTIME
Refills: 2 | COMMUNITY
Start: 2017-08-31 | End: 2018-06-26

## 2017-12-06 RX ORDER — TOBRAMYCIN AND DEXAMETHASONE 3; 1 MG/ML; MG/ML
1 SUSPENSION/ DROPS OPHTHALMIC 4 TIMES DAILY
Refills: 0 | COMMUNITY
Start: 2017-08-31 | End: 2021-02-09

## 2017-12-06 RX ORDER — METOPROLOL TARTRATE 100 MG
TABLET ORAL
Qty: 270 TABLET | Refills: 3 | Status: SHIPPED | OUTPATIENT
Start: 2017-12-06 | End: 2018-02-23

## 2017-12-06 NOTE — PROGRESS NOTES
SUBJECTIVE:   Demetri Booth is a 62 year old male who presents to clinic today for the following health issues:    Patient here today with wife.    Diabetes Follow-up      Patient is checking blood sugars: not at all    Diabetic concerns: None     Symptoms of hypoglycemia (low blood sugar): none     Paresthesias (numbness or burning in feet) or sores: No     Date of last diabetic eye exam: 2017    Hyperlipidemia Follow-Up      Rate your low fat/cholesterol diet?: good    Taking statin?  Yes, no muscle aches from statin    Other lipid medications/supplements?:  none    Hypertension Follow-up      Outpatient blood pressures are not being checked.    Low Salt Diet: not monitoring salt. States he likes salt.    BP Readings from Last 2 Encounters:   12/06/17 138/88   04/12/17 131/84     Hemoglobin A1C (%)   Date Value   04/12/2017 7.0 (H)   06/23/2016 8.3 (H)     LDL Cholesterol Calculated (mg/dL)   Date Value   04/12/2017 82   06/23/2016 91         Amount of exercise or physical activity: None    Problems taking medications regularly: No    Medication side effects: Fatigue    Diet: low fat/cholesterol and diabetic    Pt is seeing ophthamology for infection in Lt eye.  He has to go back for recheck in 1 week            Problem list and histories reviewed & adjusted, as indicated.  Additional history: as documented    Labs reviewed in EPIC    Reviewed and updated as needed this visit by clinical staff  Tobacco  Allergies  Meds  Problems  Med Hx  Surg Hx  Fam Hx  Soc Hx        Reviewed and updated as needed this visit by Provider  Allergies  Problems         ROS:  CONSTITUTIONAL:NEGATIVE for fever, chills, change in weight  INTEGUMENTARY/SKIN: rash scattered  RESP:NEGATIVE for significant cough or SOB  CV: POSITIVE for irregular heart beat  GI: NEGATIVE for nausea, abdominal pain, heartburn, or change in bowel habits  NEURO: NEGATIVE for weakness, dizziness or paresthesias  ENDOCRINE: NEGATIVE for temperature  intolerance, skin/hair changes and POSITIVE  for HX diabetes    OBJECTIVE:                                                    /88 (BP Location: Left arm, Patient Position: Sitting, Cuff Size: Adult Large)  Pulse 95  Temp 98  F (36.7  C) (Tympanic)  Resp 20  Wt (!) 363 lb (164.7 kg)  SpO2 97%  BMI 54.39 kg/m2  Body mass index is 54.39 kg/(m^2).  GENERAL APPEARANCE: healthy, alert and no distress  NECK: no adenopathy, no asymmetry, masses, or scars, thyroid normal to palpation and no bruits  RESP: lungs clear to auscultation - no rales, rhonchi or wheezes  CV: normal S1 S2, no S3 or S4, no murmur, click or rub and irregularly irregular rhythm  ABDOMEN: soft, nontender, without hepatosplenomegaly or masses and bowel sounds normal  MS: extremities normal- no gross deformities noted  DIABETIC FOOT EXAM: normal DP and PT pulses, no trophic changes or ulcerative lesions, normal sensory exam and normal monofilament exam    Diagnostic test results:  Lab: see below, results pending     ASSESSMENT/PLAN:                                                        ICD-10-CM    1. Type 2 diabetes mellitus without complication, without long-term current use of insulin (H) E11.9 Hemoglobin A1c     Albumin Random Urine Quantitative with Creat Ratio     TSH with free T4 reflex     Basic metabolic panel     metFORMIN (GLUCOPHAGE) 1000 MG tablet     glimepiride (AMARYL) 4 MG tablet   2. Hypercholesterolemia E78.00 Lipid panel reflex to direct LDL Non-fasting     ALT     AST   3. Essential hypertension, benign I10    4. DM (diabetes mellitus) type II uncontrolled, periph vascular disorder (H) E11.51 sitagliptin (JANUVIA) 100 MG tablet    E11.65    5. Hypertension goal BP (blood pressure) < 130/80 I10 metoprolol (LOPRESSOR) 100 MG tablet     lisinopril (PRINIVIL/ZESTRIL) 5 MG tablet   6. Encounter for immunization Z23 HC FLU VAC PRESRV FREE QUAD SPLIT VIR 3+YRS IM     VACCINE ADMINISTRATION, INITIAL       Follow up with Provider  -6 mo if numbers show ok control otherwise will need to recheck in 3 mo   Patient Instructions   Work on weight, increase exercise, decrease calories      Cheko Osborn MD  [facility]

## 2017-12-06 NOTE — NURSING NOTE
Injectable Influenza Immunization Documentation    1.  Are you sick today? (Fever of 100.5 or higher on the day of the clinic)   No    2.  Have you ever had Guillain-[place] Syndrome within 6 weeks of an influenza vaccionation?  No    3. Do you have a life-threatening allergy to eggs?  No    4. Do you have a life-threatening allergy to a component of the vaccine? May include antibiotics, gelatin or latex.  No     5. Have you ever had a reaction to a dose of flu vaccine that needed immediate medical attention?  No     Form completed by Princess LINDSEY Betts CMA

## 2017-12-06 NOTE — MR AVS SNAPSHOT
After Visit Summary   12/6/2017    Demetri Booth    MRN: [id number]           Patient Information     Date Of Birth          1955        Visit Information        Provider Department      12/6/2017 4:00 PM Cheko Osborn MD [facility]        Today's Diagnoses     Type 2 diabetes mellitus without complication, without long-term current use of insulin (H)    -  1    Hypercholesterolemia        Essential hypertension, benign        DM (diabetes mellitus) type II uncontrolled, periph vascular disorder (H)        Hypertension goal BP (blood pressure) < 130/80          Care Instructions    Work on weight, increase exercise, decrease calories          Follow-ups after your visit        Follow-up notes from your care team     Return in about 6 months (around 6/6/2018).      Who to contact     If you have questions or need follow up information about today's clinic visit or your schedule please contact [facility] directly at [phone].  Normal or non-critical lab and imaging results will be communicated to you by "[employer]hart, letter or phone within 4 business days after the clinic has received the results. If you do not hear from us within 7 days, please contact the clinic through "[employer]hart or phone. If you have a critical or abnormal lab result, we will notify you by phone as soon as possible.  Submit refill requests through [employer] or call your pharmacy and they will forward the refill request to us. Please allow 3 business days for your refill to be completed.          Additional Information About Your Visit        MyChart Information     [employer] gives you secure access to your electronic health record. If you see a primary care provider, you can also send messages to your care team and make appointments. If you have questions, please call your primary care clinic.  If you do not have a primary care provider, please call [phone] and  they will assist you.        Care EveryWhere ID     This is your Care EveryWhere ID. This could be used by other organizations to access your [place] medical records  [id number]        Your Vitals Were     Pulse Temperature Respirations Pulse Oximetry BMI (Body Mass Index)       95 98  F (36.7  C) (Tympanic) 20 97% 54.39 kg/m2        Blood Pressure from Last 3 Encounters:   12/06/17 138/88   04/12/17 131/84   02/15/17 132/90    Weight from Last 3 Encounters:   12/06/17 (!) 363 lb (164.7 kg)   04/12/17 (!) 360 lb (163.3 kg)   02/15/17 (!) 365 lb 6.4 oz (165.7 kg)              We Performed the Following     Albumin Random Urine Quantitative with Creat Ratio     ALT     AST     Basic metabolic panel     Hemoglobin A1c     Lipid panel reflex to direct LDL Non-fasting     TSH with free T4 reflex          Today's Medication Changes          These changes are accurate as of: 12/6/17  4:39 PM.  If you have any questions, ask your nurse or doctor.               These medicines have changed or have updated prescriptions.        Dose/Directions    metFORMIN 1000 MG tablet   Commonly known as:  GLUCOPHAGE   This may have changed:  See the new instructions.   Used for:  Type 2 diabetes mellitus without complication, without long-term current use of insulin (H)   Changed by:  Cheko Osborn MD        TAKE 1 TABLET BY MOUTH TWICE DAILY WITH MEALS   Quantity:  180 tablet   Refills:  1       * metoprolol 100 MG tablet   Commonly known as:  LOPRESSOR   This may have changed:  Another medication with the same name was changed. Make sure you understand how and when to take each.   Used for:  Hypertension goal BP (blood pressure) < 130/80   Changed by:  Lisa De La Garza APRN CNP        Take one pill in the AM and one pill in the PM   Quantity:  270 tablet   Refills:  3       * metoprolol 100 MG tablet   Commonly known as:  LOPRESSOR   This may have changed:  See the new instructions.   Used for:  Hypertension goal BP  (blood pressure) < 130/80   Changed by:  Cheko Osborn MD        TAKE 1 1/2 TABLETS BY MOUTH TWICE DAILY   Quantity:  270 tablet   Refills:  3       sitagliptin 100 MG tablet   Commonly known as:  TELLYUVIA   This may have changed:  See the new instructions.   Used for:  DM (diabetes mellitus) type II uncontrolled, periph vascular disorder (H)   Changed by:  Cheko Osborn MD        Dose:  100 mg   Take 1 tablet (100 mg) by mouth daily   Quantity:  90 tablet   Refills:  1       * Notice:  This list has 2 medication(s) that are the same as other medications prescribed for you. Read the directions carefully, and ask your doctor or other care provider to review them with you.         Where to get your medicines      [facility] [address] [place] [address] AT [address] [address]     Phone:  [phone]     glimepiride 4 MG tablet    lisinopril 5 MG tablet    metFORMIN 1000 MG tablet    metoprolol 100 MG tablet    sitagliptin 100 MG tablet                Primary Care Provider Office Phone # Fax #    Cheko Osborn [phone] [address] [facility] [address]        Equal Access to Services     DELANEY OJEDA AH: Hadii nelda ku hadasho Soomaali, waaxda luqadaha, qaybta kaalmada adeegyada, waxay idiin haykumarn kemi encarnacion. So [facility] [phone].    ATENCIÓN: Si habla español, tiene a donaldson disposición servicios gratuitos de asistencia lingüística. Llallyssa al [phone].    We comply with applicable federal civil rights laws and [place] laws. We do not discriminate on the basis of race, color, national origin, age, disability, sex, sexual orientation, or gender identity.            Thank you!     Thank you for choosing [facility]  for your care. Our goal is always to provide you with excellent care. Hearing back from our patients is one way we can continue to  improve our services. Please take a few minutes to complete the written survey that you may receive in the mail after your visit with us. Thank you!             Your Updated Medication List - Protect others around you: Learn how to safely use, store and throw away your medicines at www.disposemymeds.org.          This list is accurate as of: 12/6/17  4:39 PM.  Always use your most recent med list.                   Brand Name Dispense Instructions for use Diagnosis    ACCU-CHEK SMARTVIEW test strip   Generic drug:  blood glucose monitoring      by In Vitro route daily Reported on 2/15/2017        apixaban ANTICOAGULANT 5 MG tablet    ELIQUIS    180 tablet    Take 1 tablet (5 mg) by mouth 2 times daily    Atrial fibrillation with RVR (H)       atorvastatin 20 MG tablet    LIPITOR     Take 10 mg by mouth daily        blood glucose monitoring lancets      1 each by In Vitro route daily Reported on 2/15/2017        clobetasol 0.05 % cream    TEMOVATE    30 g    APPLY TOPICALLY TWICE DAILY AS NEEDED FOR UP TO TWO WEEKS, THEN INTERMITTENLY.    Psoriasis of scalp       glimepiride 4 MG tablet    AMARYL    90 tablet    TAKE 1 TABLET BY MOUTH EVERY MORNING BEFORE BREAKFAST    Type 2 diabetes mellitus without complication, without long-term current use of insulin (H)       hydrocortisone 1 % ointment     30 g    Apply sparingly to affected area.    Atrial fibrillation (H)       lisinopril 5 MG tablet    PRINIVIL/ZESTRIL    90 tablet    Take 1 tablet (5 mg) by mouth daily    Hypertension goal BP (blood pressure) < 130/80       metFORMIN 1000 MG tablet    GLUCOPHAGE    180 tablet    TAKE 1 TABLET BY MOUTH TWICE DAILY WITH MEALS    Type 2 diabetes mellitus without complication, without long-term current use of insulin (H)       * metoprolol 100 MG tablet    LOPRESSOR    270 tablet    Take one pill in the AM and one pill in the PM    Hypertension goal BP (blood pressure) < 130/80       * metoprolol 100 MG tablet    LOPRESSOR     270 tablet    TAKE 1 1/2 TABLETS BY MOUTH TWICE DAILY    Hypertension goal BP (blood pressure) < 130/80       multivitamin, therapeutic with minerals Tabs tablet      Take 1 tablet by mouth daily.        order for DME     1 Device    CPAP every night  Needs small travel sized CPAP machine    Sleep apnea, unspecified type       sitagliptin 100 MG tablet    JANUVIA    90 tablet    Take 1 tablet (100 mg) by mouth daily    DM (diabetes mellitus) type II uncontrolled, periph vascular disorder (H)       TAZORAC 0.05 % Crea cream   Generic drug:  tazarotene      Externally apply  topically daily as needed. Indications: Plaque Psoriasis        TYLENOL PO      Take 500 mg by mouth every 4 hours as needed        * Notice:  This list has 2 medication(s) that are the same as other medications prescribed for you. Read the directions carefully, and ask your doctor or other care provider to review them with you.

## 2017-12-07 LAB
ALT SERPL W P-5'-P-CCNC: 36 U/L (ref 0–70)
ANION GAP SERPL CALCULATED.3IONS-SCNC: 7 MMOL/L (ref 3–14)
AST SERPL W P-5'-P-CCNC: 40 U/L (ref 0–45)
BUN SERPL-MCNC: 12 MG/DL (ref 7–30)
CALCIUM SERPL-MCNC: 9.2 MG/DL (ref 8.5–10.1)
CHLORIDE SERPL-SCNC: 103 MMOL/L (ref 94–109)
CHOLEST SERPL-MCNC: 160 MG/DL
CO2 SERPL-SCNC: 27 MMOL/L (ref 20–32)
CREAT SERPL-MCNC: 0.95 MG/DL (ref 0.66–1.25)
CREAT UR-MCNC: 66 MG/DL
GFR SERPL CREATININE-BSD FRML MDRD: 79 ML/MIN/1.7M2
GLUCOSE SERPL-MCNC: 106 MG/DL (ref 70–99)
HDLC SERPL-MCNC: 52 MG/DL
LDLC SERPL CALC-MCNC: 86 MG/DL
MICROALBUMIN UR-MCNC: 194 MG/L
MICROALBUMIN/CREAT UR: 295.73 MG/G CR (ref 0–17)
NONHDLC SERPL-MCNC: 108 MG/DL
POTASSIUM SERPL-SCNC: 4.7 MMOL/L (ref 3.4–5.3)
SODIUM SERPL-SCNC: 138 MMOL/L (ref 133–144)
TRIGL SERPL-MCNC: 108 MG/DL
TSH SERPL DL<=0.005 MIU/L-ACNC: 2.19 MU/L (ref 0.4–4)

## 2018-02-23 ENCOUNTER — OFFICE VISIT (OUTPATIENT)
Dept: CARDIOLOGY | Facility: CLINIC | Age: 63
End: 2018-02-23
Attending: INTERNAL MEDICINE
Payer: COMMERCIAL

## 2018-02-23 VITALS
WEIGHT: 315 LBS | SYSTOLIC BLOOD PRESSURE: 130 MMHG | DIASTOLIC BLOOD PRESSURE: 70 MMHG | BODY MASS INDEX: 46.65 KG/M2 | HEIGHT: 69 IN | HEART RATE: 97 BPM

## 2018-02-23 DIAGNOSIS — I48.91 ATRIAL FIBRILLATION WITH RVR (H): ICD-10-CM

## 2018-02-23 DIAGNOSIS — I10 HYPERTENSION GOAL BP (BLOOD PRESSURE) < 130/80: ICD-10-CM

## 2018-02-23 PROCEDURE — 99214 OFFICE O/P EST MOD 30 MIN: CPT | Performed by: NURSE PRACTITIONER

## 2018-02-23 PROCEDURE — 93000 ELECTROCARDIOGRAM COMPLETE: CPT | Performed by: NURSE PRACTITIONER

## 2018-02-23 RX ORDER — LISINOPRIL 5 MG/1
5 TABLET ORAL DAILY
Qty: 90 TABLET | Refills: 2 | Status: SHIPPED | OUTPATIENT
Start: 2018-02-23 | End: 2018-03-29 | Stop reason: DRUGHIGH

## 2018-02-23 RX ORDER — METOPROLOL TARTRATE 100 MG
TABLET ORAL
Qty: 270 TABLET | Refills: 3 | Status: SHIPPED | OUTPATIENT
Start: 2018-02-23 | End: 2018-04-19
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
## 2018-02-23 NOTE — MR AVS SNAPSHOT
After Visit Summary   2/23/2018    Demetri Booth    MRN: [id number]           Patient Information     Date Of Birth          1955        Visit Information        Provider Department      2/23/2018 12:30 PM Luz Maria Miranda APRN CNP [facility]        Today's Diagnoses     Atrial fibrillation with RVR        Hypertension goal BP (blood pressure) < 130/80          Care Instructions    Cardiology Provider you saw in clinic today: EVONNE Avina, CNP    Medication Changes:      Filled cardiac medications.      Labs/Tests needed:      ECHO in a few weeks.       Follow-up Visit:     Follow up with primary regarding your balance    Please take your blood pressure and Heart rate and send them to use on Monday on via My Chart     Further Instructions:      You will receive all normal lab and testing results via [facility] or mail if not reviewed in clinic today. Please contact our office if you need assistance with setting up MyChart.    If you need a medication refill please contact your pharmacy. Please allow 3 business days for your refill to be completed.     As always, thank you for trusting us with your health care needs!    If you have any questions regarding your visit please contact your care team:   Cardiology  Telephone Number    Afib RNs  Josephine Loja, and Jolene [phone]     Call for EP procedure scheduling concerns  Ana Still [phone]           Device Clinic (Pacemakers, ICDs, Loop)   RN's :      Ruth Ricketts Eva, Lynda, MJ Stephanie, Sue During business hours:   [phone]                      Follow-ups after your visit        Who to contact     If you have questions or need follow up information about today's clinic visit or your schedule please contact [facility] directly at [phone].  Normal or non-critical lab and imaging results will be communicated to you by MyChart, letter or phone  "within 4 business days after the clinic has received the results. If you do not hear from us within 7 days, please contact the clinic through FND or phone. If you have a critical or abnormal lab result, we will notify you by phone as soon as possible.  Submit refill requests through FND or call your pharmacy and they will forward the refill request to us. Please allow 3 business days for your refill to be completed.          Additional Information About Your Visit        [facility] Information     FND gives you secure access to your electronic health record. If you see a primary care provider, you can also send messages to your care team and make appointments. If you have questions, please call your primary care clinic.  If you do not have a primary care provider, please call [phone] and they will assist you.        Care EveryWhere ID     This is your Care EveryWhere ID. This could be used by other organizations to access your [facility] medical records  [id number]        Your Vitals Were     Pulse Height BMI (Body Mass Index)             97 1.74 m (5' 8.5\") 54.24 kg/m2          Blood Pressure from Last 3 Encounters:   02/23/18 (!) 146/101   12/06/17 138/88   04/12/17 131/84    Weight from Last 3 Encounters:   02/23/18 (!) 164.2 kg (362 lb)   12/06/17 (!) 164.7 kg (363 lb)   04/12/17 (!) 163.3 kg (360 lb)              We Performed the Following     EKG 12-lead complete w/read - Clinics (performed today)     Follow-Up with Cardiac Advanced Practice Provider          Where to get your medicines      These medications were sent to [facility] [address] [address] AT [address] [address]     Phone:  [phone]     apixaban ANTICOAGULANT 5 MG tablet    lisinopril 5 MG tablet    metoprolol tartrate 100 MG tablet          Primary Care Provider Office Phone # Fax #    Cheko Osborn [phone]       [address]  [facility] [address]        Equal Access to Services     [facility] RUSTY : Hadii aad ku hadcristinoo Soryanali, waaxda luqadaha, qaybta kaalmada [place]. So [facility] [phone].    ATENCIÓN: Si habla español, tiene a donaldson disposición servicios gratuitos de asistencia lingüística. [place]oshame al [phone].    We comply with applicable federal civil rights laws and [place] laws. We do not discriminate on the basis of race, color, national origin, age, disability, sex, sexual orientation, or gender identity.            Thank you!     Thank you for choosing [facility]  for your care. Our goal is always to provide you with excellent care. Hearing back from our patients is one way we can continue to improve our services. Please take a few minutes to complete the written survey that you may receive in the mail after your visit with us. Thank you!             Your Updated Medication List - Protect others around you: Learn how to safely use, store and throw away your medicines at www.disposemymeds.org.          This list is accurate as of 2/23/18  1:02 PM.  Always use your most recent med list.                   Brand Name Dispense Instructions for use Diagnosis    ACCU-CHEK SMARTVIEW test strip   Generic drug:  blood glucose monitoring      by In Vitro route daily Reported on 2/15/2017        apixaban ANTICOAGULANT 5 MG tablet    ELIQUIS    180 tablet    Take 1 tablet (5 mg) by mouth 2 times daily    Atrial fibrillation with RVR (H)       atorvastatin 20 MG tablet    LIPITOR     Take 10 mg by mouth daily        blood glucose monitoring lancets      1 each by In Vitro route daily Reported on 2/15/2017        clobetasol 0.05 % cream    TEMOVATE    30 g    APPLY TOPICALLY TWICE DAILY AS NEEDED FOR UP TO TWO WEEKS, THEN INTERMITTENLY.    Psoriasis of scalp       erythromycin ophthalmic ointment    ROMYCIN     Place 1 Application Into the left eye At  Bedtime        glimepiride 4 MG tablet    AMARYL    90 tablet    TAKE 1 TABLET BY MOUTH EVERY MORNING BEFORE BREAKFAST    Type 2 diabetes mellitus without complication, without long-term current use of insulin (H)       hydrocortisone 1 % ointment     30 g    Apply sparingly to affected area.    Atrial fibrillation (H)       lisinopril 5 MG tablet    PRINIVIL/ZESTRIL    90 tablet    Take 1 tablet (5 mg) by mouth daily    Hypertension goal BP (blood pressure) < 130/80       metFORMIN 1000 MG tablet    GLUCOPHAGE    180 tablet    TAKE 1 TABLET BY MOUTH TWICE DAILY WITH MEALS    Type 2 diabetes mellitus without complication, without long-term current use of insulin (H)       * metoprolol tartrate 100 MG tablet    LOPRESSOR    270 tablet    Take one pill in the AM and one pill in the PM    Hypertension goal BP (blood pressure) < 130/80       * metoprolol tartrate 100 MG tablet    LOPRESSOR    270 tablet    TAKE 1 1/2 TABLETS BY MOUTH TWICE DAILY    Hypertension goal BP (blood pressure) < 130/80       multivitamin, therapeutic with minerals Tabs tablet      Take 1 tablet by mouth daily.        order for DME     1 Device    CPAP every night  Needs small travel sized CPAP machine    Sleep apnea, unspecified type       sitagliptin 100 MG tablet    JANUVIA    90 tablet    Take 1 tablet (100 mg) by mouth daily    DM (diabetes mellitus) type II uncontrolled, periph vascular disorder (H)       TAZORAC 0.05 % Crea cream   Generic drug:  tazarotene      Externally apply  topically daily as needed. Indications: Plaque Psoriasis        tobramycin-dexamethasone 0.3-0.1 % ophthalmic susp    TOBRADEX     Place 1 drop Into the left eye 4 times daily        TYLENOL PO      Take 500 mg by mouth every 4 hours as needed        * Notice:  This list has 2 medication(s) that are the same as other medications prescribed for you. Read the directions carefully, and ask your doctor or other care provider to review them with you.
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
## 2018-02-23 NOTE — PATIENT INSTRUCTIONS
Cardiology Provider you saw in clinic today: EVONNE Avina CNP    Medication Changes:      Filled cardiac medications.      Labs/Tests needed:      ECHO in a few weeks.       Follow-up Visit:     Follow up with primary regarding your balance    Please take your blood pressure and Heart rate and send them to use on Monday on via My Chart     Further Instructions:      You will receive all normal lab and testing results via RadioScapehart or mail if not reviewed in clinic today. Please contact our office if you need assistance with setting up MyChart.    If you need a medication refill please contact your pharmacy. Please allow 3 business days for your refill to be completed.     As always, thank you for trusting us with your health care needs!    If you have any questions regarding your visit please contact your care team:   Cardiology  Telephone Number    Afib RNs  Josephine Loja, and Jolene [phone]     Call for EP procedure scheduling concerns  Ana Still [phone]           Device Clinic (Pacemakers, ICDs, Loop)   RN's :      Ruth Ricketts Eva, Lynda, MJ Stephanie, Sue During business hours:   [phone]

## 2018-03-06 ENCOUNTER — TELEPHONE (OUTPATIENT)
Dept: CARDIOLOGY | Facility: CLINIC | Age: 63
End: 2018-03-06

## 2018-03-06 DIAGNOSIS — I10 HYPERTENSION, UNSPECIFIED TYPE: Primary | ICD-10-CM

## 2018-03-12 ENCOUNTER — ALLIED HEALTH/NURSE VISIT (OUTPATIENT)
Dept: NURSING | Facility: CLINIC | Age: 63
End: 2018-03-12
Payer: COMMERCIAL

## 2018-03-12 VITALS — HEART RATE: 82 BPM | SYSTOLIC BLOOD PRESSURE: 135 MMHG | OXYGEN SATURATION: 97 % | DIASTOLIC BLOOD PRESSURE: 89 MMHG

## 2018-03-12 DIAGNOSIS — I10 HYPERTENSION, UNSPECIFIED TYPE: ICD-10-CM

## 2018-03-12 DIAGNOSIS — I10 HTN (HYPERTENSION): Primary | ICD-10-CM

## 2018-03-12 LAB
ANION GAP SERPL CALCULATED.3IONS-SCNC: 7 MMOL/L (ref 3–14)
BUN SERPL-MCNC: 9 MG/DL (ref 7–30)
CALCIUM SERPL-MCNC: 8.7 MG/DL (ref 8.5–10.1)
CHLORIDE SERPL-SCNC: 100 MMOL/L (ref 94–109)
CO2 SERPL-SCNC: 27 MMOL/L (ref 20–32)
CREAT SERPL-MCNC: 0.83 MG/DL (ref 0.66–1.25)
GFR SERPL CREATININE-BSD FRML MDRD: >90 ML/MIN/1.7M2
GLUCOSE SERPL-MCNC: 149 MG/DL (ref 70–99)
POTASSIUM SERPL-SCNC: 4 MMOL/L (ref 3.4–5.3)
SODIUM SERPL-SCNC: 134 MMOL/L (ref 133–144)

## 2018-03-12 PROCEDURE — 36415 COLL VENOUS BLD VENIPUNCTURE: CPT | Performed by: NURSE PRACTITIONER

## 2018-03-12 PROCEDURE — 99207 ZZC NO CHARGE LOS: CPT

## 2018-03-12 PROCEDURE — 80048 BASIC METABOLIC PNL TOTAL CA: CPT | Performed by: NURSE PRACTITIONER

## 2018-03-12 NOTE — NURSING NOTE
"Patient here today for nurse only appointment to check his BP. He stated he saw Cardio  1 week ago and they have been \"regulating his meds\" for this issue for time being. Patient denies any SOB,Dyspnea and/or chest pains as of the time he in office. Patient has another appointment with Cardio  4/19/2018.  Shawna Murguia LPN  "

## 2018-03-12 NOTE — MR AVS SNAPSHOT
After Visit Summary   3/12/2018    Demetri Booth    MRN: [id number]           Patient Information     Date Of Birth          1955        Visit Information        Provider Department      3/12/2018 9:00 AM BM NURSE [facility]        Today's Diagnoses     HTN (hypertension)    -  1       Follow-ups after your visit        Your next 10 appointments already scheduled     Apr 11, 2018  1:45 PM CDT   Ech Complete with [id number]   [facility] CV Echocardiography (Cardiovascular [facility] at [facility])    [address]  [address]  [place] [id number] [phone]           1. Please bring or wear a comfortable two-piece outfit. 2. You may eat, drink and take your normal medicines. 3. For any questions that cannot be answered, please contact the ordering physician            Apr 19, 2018  1:10 PM CDT   [facility] EP RETURN with EVONNE Cade CNP   [facility] ([facility])    [address]  [place] [id number] [phone]              Who to contact     If you have questions or need follow up information about today's clinic visit or your schedule please contact [facility] directly at [phone].  Normal or non-critical lab and imaging results will be communicated to you by Celebration Creationhart, letter or phone within 4 business days after the clinic has received the results. If you do not hear from us within 7 days, please contact the clinic through MyChart or phone. If you have a critical or abnormal lab result, we will notify you by phone as soon as possible.  Submit refill requests through [employer] or call your pharmacy and they will forward the refill request to us. Please allow 3 business days for your refill to be completed.          Additional Information About Your Visit        App[employer]stitute Information     [employer] gives you secure access to  your electronic health record. If you see a primary care provider, you can also send messages to your care team and make appointments. If you have questions, please call your primary care clinic.  If you do not have a primary care provider, please call [phone] and they will assist you.        Care EveryWhere ID     This is your Care EveryWhere ID. This could be used by other organizations to access your [facility] medical records  [id number]        Your Vitals Were     Pulse Pulse Oximetry                82 97%           Blood Pressure from Last 3 Encounters:   03/12/18 135/89   02/23/18 130/70   12/06/17 138/88    Weight from Last 3 Encounters:   02/23/18 (!) 362 lb (164.2 kg)   12/06/17 (!) 363 lb (164.7 kg)   04/12/17 (!) 360 lb (163.3 kg)              Today, you had the following     No orders found for display       Primary Care Provider Office Phone # Fax #    Cheko Osborn [phone] [address] [facility] [address]        Equal Access to Services     [facility] ParkJULIO CÉSAR : Hadii aad ku hadasho Soomaali, waaxda luqadaha, qaybta kaalmada adeegyada, venus chaves . So [facility] [phone].    ATENCIÓN: Si habla español, tiene a donaldson disposición servicios gratuitos de asistencia lingüística. David al [phone].    We comply with applicable federal civil rights laws and [place] laws. We do not discriminate on the basis of race, color, national origin, age, disability, sex, sexual orientation, or gender identity.            Thank you!     Thank you for choosing [facility]  for your care. Our goal is always to provide you with excellent care. Hearing back from our patients is one way we can continue to improve our services. Please take a few minutes to complete the written survey that you may receive in the mail after your visit with us. Thank you!             Your Updated Medication List - Protect others around you: Learn  how to safely use, store and throw away your medicines at www.disposemymeds.org.          This list is accurate as of 3/12/18  9:38 AM.  Always use your most recent med list.                   Brand Name Dispense Instructions for use Diagnosis    ACCU-CHEK SMARTVIEW test strip   Generic drug:  blood glucose monitoring      by In Vitro route daily Reported on 2/15/2017        apixaban ANTICOAGULANT 5 MG tablet    ELIQUIS    180 tablet    Take 1 tablet (5 mg) by mouth 2 times daily    Atrial fibrillation with RVR (H)       atorvastatin 20 MG tablet    LIPITOR     Take 10 mg by mouth daily        blood glucose monitoring lancets      1 each by In Vitro route daily Reported on 2/15/2017        clobetasol 0.05 % cream    TEMOVATE    30 g    APPLY TOPICALLY TWICE DAILY AS NEEDED FOR UP TO TWO WEEKS, THEN INTERMITTENLY.    Psoriasis of scalp       erythromycin ophthalmic ointment    ROMYCIN     Place 1 Application Into the left eye At Bedtime        glimepiride 4 MG tablet    AMARYL    90 tablet    TAKE 1 TABLET BY MOUTH EVERY MORNING BEFORE BREAKFAST    Type 2 diabetes mellitus without complication, without long-term current use of insulin (H)       hydrocortisone 1 % ointment     30 g    Apply sparingly to affected area.    Atrial fibrillation (H)       lisinopril 5 MG tablet    PRINIVIL/ZESTRIL    90 tablet    Take 1 tablet (5 mg) by mouth daily    Hypertension goal BP (blood pressure) < 130/80       metFORMIN 1000 MG tablet    GLUCOPHAGE    180 tablet    TAKE 1 TABLET BY MOUTH TWICE DAILY WITH MEALS    Type 2 diabetes mellitus without complication, without long-term current use of insulin (H)       * metoprolol tartrate 100 MG tablet    LOPRESSOR    270 tablet    Take one pill in the AM and one pill in the PM    Hypertension goal BP (blood pressure) < 130/80       * metoprolol tartrate 100 MG tablet    LOPRESSOR    270 tablet    TAKE 1 1/2 TABLETS BY MOUTH TWICE DAILY    Hypertension goal BP (blood pressure) < 130/80        multivitamin, therapeutic with minerals Tabs tablet      Take 1 tablet by mouth daily.        order for DME     1 Device    CPAP every night  Needs small travel sized CPAP machine    Sleep apnea, unspecified type       sitagliptin 100 MG tablet    JANUVIA    90 tablet    Take 1 tablet (100 mg) by mouth daily    DM (diabetes mellitus) type II uncontrolled, periph vascular disorder (H)       TAZORAC 0.05 % Crea cream   Generic drug:  tazarotene      Externally apply  topically daily as needed. Indications: Plaque Psoriasis        tobramycin-dexamethasone 0.3-0.1 % ophthalmic susp    TOBRADEX     Place 1 drop Into the left eye 4 times daily        TYLENOL PO      Take 500 mg by mouth every 4 hours as needed        * Notice:  This list has 2 medication(s) that are the same as other medications prescribed for you. Read the directions carefully, and ask your doctor or other care provider to review them with you.

## 2018-03-13 DIAGNOSIS — E11.9 TYPE 2 DIABETES MELLITUS WITHOUT COMPLICATION, WITHOUT LONG-TERM CURRENT USE OF INSULIN (H): Primary | ICD-10-CM

## 2018-03-13 LAB
CREAT UR-MCNC: 98 MG/DL
MICROALBUMIN UR-MCNC: 37 MG/L
MICROALBUMIN/CREAT UR: 37.53 MG/G CR (ref 0–17)

## 2018-03-13 PROCEDURE — 82043 UR ALBUMIN QUANTITATIVE: CPT | Performed by: FAMILY MEDICINE

## 2018-03-29 ENCOUNTER — TELEPHONE (OUTPATIENT)
Dept: CARDIOLOGY | Facility: CLINIC | Age: 63
End: 2018-03-29

## 2018-03-29 RX ORDER — LISINOPRIL 5 MG/1
5 TABLET ORAL DAILY
Qty: 30 TABLET | COMMUNITY
Start: 2018-03-29 | End: 2018-04-19

## 2018-03-29 NOTE — TELEPHONE ENCOUNTER
Follow up phone call to patient to see how his HR/BP are since his Toprol and Lisinopril were adjusted. He tells me that his BP's are consistently in the 120/80 range and HR's on the 80's. He is on Toprol 1.5 tablets ( 150 MG)  BID and Lisinopril 5 MG in am and 2.5 MG pm. He split the pm dose because the 5 MG was making him dizzy and since doing this he has not had any symptoms. Overall, he is feeling well and has no questions. He will have his echo in a few weeks and then follow up with Bambi Terry

## 2018-03-29 NOTE — TELEPHONE ENCOUNTER
----- Message from EVONNE Cade CNP sent at 3/29/2018  3:44 PM CDT -----  Regarding: Can you call patient   Can you call Demetri Booth see how he is feeling, how is BP and HR are doing.      I would also like him to schedule an ECHO once his HR are better controlled. (meaning under 110 bpm consistently)    Thank you, luz maria   ----- Message -----     From: Luz Maria Miranda APRN CNP     Sent: 3/27/2018       To: EVONNE Cade CNP  Subject: Will need an ECHO                                Call pt and see what HR and BP are and then end up ECHO

## 2018-04-11 ENCOUNTER — HOSPITAL ENCOUNTER (OUTPATIENT)
Dept: CARDIOLOGY | Facility: CLINIC | Age: 63
Discharge: HOME | End: 2018-04-11
Attending: NURSE PRACTITIONER | Admitting: NURSE PRACTITIONER
Payer: COMMERCIAL

## 2018-04-11 DIAGNOSIS — I10 HYPERTENSION, UNSPECIFIED TYPE: ICD-10-CM

## 2018-04-11 PROCEDURE — 93306 TTE W/DOPPLER COMPLETE: CPT | Mod: 26 | Performed by: INTERNAL MEDICINE

## 2018-04-11 PROCEDURE — 25500064 ZZH RX 255 OP 636: Performed by: NURSE PRACTITIONER

## 2018-04-11 PROCEDURE — 40000264 ECHO COMPLETE WITH OPTISON

## 2018-04-11 RX ADMIN — HUMAN ALBUMIN MICROSPHERES AND PERFLUTREN 9 ML: 10; .22 INJECTION, SOLUTION INTRAVENOUS at 14:45

## 2018-04-12 NOTE — TELEPHONE ENCOUNTER
JANUVIA 100MG TABLETS         Last Written Prescription Date: 7/14/2017  Last Fill Quantity: 90, # refills: 0  Last Office Visit with G, P or  Health prescribing provider:  4/12/2017        BP Readings from Last 3 Encounters:   04/12/17 131/84   02/15/17 132/90   06/22/16 (!) 148/98     Lab Results   Component Value Date    MICROL 11 04/12/2017     Lab Results   Component Value Date    UMALCR 8.63 04/12/2017     Creatinine   Date Value Ref Range Status   04/12/2017 0.91 0.66 - 1.25 mg/dL Final   ]  GFR Estimate   Date Value Ref Range Status   04/12/2017 84 >60 mL/min/1.7m2 Final     Comment:     Non  GFR Calc   06/23/2016 68 >60 mL/min/1.7m2 Final   01/04/2016 >90 >60 mL/min/1.7m2 Final     GFR Estimate If Black   Date Value Ref Range Status   04/12/2017 >90   GFR Calc   >60 mL/min/1.7m2 Final   06/23/2016 82 >60 mL/min/1.7m2 Final   01/04/2016 >90 >60 mL/min/1.7m2 Final     Lab Results   Component Value Date    CHOL 151 04/12/2017     Lab Results   Component Value Date    HDL 52 04/12/2017     Lab Results   Component Value Date    LDL 82 04/12/2017     Lab Results   Component Value Date    TRIG 85 04/12/2017     Lab Results   Component Value Date    CHOLHDLRATIO 3.4 08/18/2014     Lab Results   Component Value Date    AST 35 04/12/2017     Lab Results   Component Value Date    ALT 35 04/12/2017     Lab Results   Component Value Date    A1C 7.0 04/12/2017    A1C 8.3 06/23/2016    A1C 8.4 01/04/2016    A1C 9.6 02/26/2015    A1C 8.1 08/18/2014     Potassium   Date Value Ref Range Status   04/12/2017 4.3 3.4 - 5.3 mmol/L Final        Problem: Pneumonia (Adult)  Goal: Signs and Symptoms of Listed Potential Problems Will be Absent, Minimized or Managed (Pneumonia)  Signs and symptoms of listed potential problems will be absent, minimized or managed by discharge/transition of care (reference Pneumonia (Adult) CPG).   Outcome: Improving  PNA?  On abx. Afebrile. Mepalex on buttocks. MRSA hx in a old toe wound. Alert, oriented. Discharge with 7 days po levaquin.

## 2018-04-19 ENCOUNTER — OFFICE VISIT (OUTPATIENT)
Dept: CARDIOLOGY | Facility: CLINIC | Age: 63
End: 2018-04-19
Attending: NURSE PRACTITIONER
Payer: COMMERCIAL

## 2018-04-19 VITALS
HEIGHT: 69 IN | DIASTOLIC BLOOD PRESSURE: 94 MMHG | WEIGHT: 315 LBS | SYSTOLIC BLOOD PRESSURE: 149 MMHG | BODY MASS INDEX: 46.65 KG/M2 | HEART RATE: 105 BPM

## 2018-04-19 DIAGNOSIS — E66.01 MORBID OBESITY (H): ICD-10-CM

## 2018-04-19 DIAGNOSIS — I10 HYPERTENSION GOAL BP (BLOOD PRESSURE) < 130/80: Primary | ICD-10-CM

## 2018-04-19 DIAGNOSIS — I48.91 ATRIAL FIBRILLATION WITH RVR (H): ICD-10-CM

## 2018-04-19 DIAGNOSIS — R07.89 CHEST TIGHTNESS: ICD-10-CM

## 2018-04-19 DIAGNOSIS — I42.9 CARDIOMYOPATHY, UNSPECIFIED TYPE (H): ICD-10-CM

## 2018-04-19 DIAGNOSIS — G47.33 OBSTRUCTIVE SLEEP APNEA SYNDROME: ICD-10-CM

## 2018-04-19 PROCEDURE — 99214 OFFICE O/P EST MOD 30 MIN: CPT | Mod: 25 | Performed by: NURSE PRACTITIONER

## 2018-04-19 PROCEDURE — 93000 ELECTROCARDIOGRAM COMPLETE: CPT | Performed by: NURSE PRACTITIONER

## 2018-04-19 RX ORDER — LISINOPRIL 5 MG/1
5 TABLET ORAL 2 TIMES DAILY
Qty: 180 TABLET | Refills: 3 | Status: SHIPPED | OUTPATIENT
Start: 2018-04-19 | End: 2019-03-25

## 2018-04-19 RX ORDER — METOPROLOL TARTRATE 100 MG
150 TABLET ORAL 2 TIMES DAILY
Qty: 90 TABLET | Refills: 3 | Status: SHIPPED | OUTPATIENT
Start: 2018-04-19 | End: 2019-05-13

## 2018-04-19 RX ORDER — DIGOXIN 125 MCG
125 TABLET ORAL DAILY
Qty: 90 TABLET | Refills: 3 | Status: SHIPPED | OUTPATIENT
Start: 2018-04-19 | End: 2018-04-19
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
## 2018-04-19 NOTE — MR AVS SNAPSHOT
After Visit Summary   4/19/2018    Demetri Booth    MRN: [id number]           Patient Information     Date Of Birth          1955        Visit Information        Provider Department      4/19/2018 1:10 PM Luz Maria Miranda APRN [facility]        Today's Diagnoses     Hypertension goal BP (blood pressure) < 130/80    -  1    Atrial fibrillation with RVR (H)        Cardiomyopathy, unspecified type (H)        Overweight BMI 50-55        Obstructive sleep apnea syndrome          Care Instructions    Start taking digoxin 0.125 mcg daily for rate control  Take lisinopril 5 mg twice daily  I will talk to Dr. Bird about rate control medications and what test to do for your chest discomfort.    If you don't hear from me by next Thursday. Please call.      You will receive all normal lab and testing results via [employer] or mail if not reviewed in clinic today. Please contact our office if you need assistance with setting up [employer].    If you need a medication refill please contact your pharmacy. Please allow 3 business days for your refill to be completed.     As always, thank you for trusting us with your health care needs!    If you have any questions regarding your visit please contact your care team:   Cardiology  Telephone Number    Afib RNs  Freedom, Nathaly, and Jolene [phone]     Call for Electrophysiology procedure scheduling concerns  Ana Still [phone]   Device Clinic (Pacemakers, ICDs, Loop)   RN's :      Ruth Ricketts Eva, Lynda, MJ, Stephanie, Sue During business hours:   [phone]                     Follow-ups after your visit        Who to contact     If you have questions or need follow up information about today's clinic visit or your schedule please contact [facility] directly at [phone].  Normal or non-critical lab and imaging results will be communicated to you by MyChart, letter or  "phone within 4 business days after the clinic has received the results. If you do not hear from us within 7 days, please contact the clinic through [employer] or phone. If you have a critical or abnormal lab result, we will notify you by phone as soon as possible.  Submit refill requests through [employer] or call your pharmacy and they will forward the refill request to us. Please allow 3 business days for your refill to be completed.          Additional Information About Your Visit        [employer] Information     [employer] gives you secure access to your electronic health record. If you see a primary care provider, you can also send messages to your care team and make appointments. If you have questions, please call your primary care clinic.  If you do not have a primary care provider, please call [phone] and they will assist you.        Care EveryWhere ID     This is your Care EveryWhere ID. This could be used by other organizations to access your [place] medical records  [id number]        Your Vitals Were     Pulse Height BMI (Body Mass Index)             105 1.753 m (5' 9\") 54.48 kg/m2          Blood Pressure from Last 3 Encounters:   04/19/18 (!) 149/94   03/12/18 135/89   02/23/18 130/70    Weight from Last 3 Encounters:   04/19/18 (!) 167.3 kg (368 lb 14.4 oz)   02/23/18 (!) 164.2 kg (362 lb)   12/06/17 (!) 164.7 kg (363 lb)              We Performed the Following     EKG 12-lead complete w/read - Clinics (performed today)     Follow-Up with Cardiac Advanced Practice Provider          Today's Medication Changes          These changes are accurate as of 4/19/18  2:09 PM.  If you have any questions, ask your nurse or doctor.               Start taking these medicines.        Dose/Directions    digoxin 125 MCG tablet   Commonly known as:  LANOXIN   Used for:  Atrial fibrillation with RVR (H)   Started by:  Luz Maria Miranda APRN CNP        Dose:  125 mcg   Take 1 tablet (125 mcg) by mouth daily "   Quantity:  90 tablet   Refills:  3         These medicines have changed or have updated prescriptions.        Dose/Directions    lisinopril 5 MG tablet   Commonly known as:  PRINIVIL/ZESTRIL   This may have changed:    - how to take this  - when to take this  - additional instructions   Used for:  Hypertension goal BP (blood pressure) < 130/80   Changed by:  Luz Maria Miranda APRN CNP        Dose:  5 mg   Take 1 tablet (5 mg) by mouth 2 times daily   Quantity:  180 tablet   Refills:  3       metoprolol tartrate 100 MG tablet   Commonly known as:  LOPRESSOR   This may have changed:    - how much to take  - how to take this  - when to take this  - Another medication with the same name was removed. Continue taking this medication, and follow the directions you see here.   Used for:  Hypertension goal BP (blood pressure) < 130/80   Changed by:  Luz Maria Miranda APRN CNP        Dose:  150 mg   Take 1.5 tablets (150 mg) by mouth 2 times daily TAKE 1 1/2 TABLETS BY MOUTH TWICE DAILY   Quantity:  90 tablet   Refills:  3            Where to get your medicines      These medications were sent to [facility] [id number] [place] [address] AT [address] [address]     Phone:  [phone]     digoxin 125 MCG tablet    lisinopril 5 MG tablet    metoprolol tartrate 100 MG tablet                Primary Care Provider Office Phone # Fax #    Cheko Osborn [phone] [address]  [facility] [address]        Equal Access to Services     HADLEY OJEDA : Hadii nelda ku hadasho Soomaali, waaxda luqadaha, qaybta kaalmada adeegyada, venus encarnacion. So [facility] [phone].    ATENCIÓN: Si habla español, tiene a donaldson disposición servicios gratuitos de asistencia lingüística. David al [phone].    We comply with applicable federal civil rights laws and [place] laws. We do not discriminate on the basis of race,  color, national origin, age, disability, sex, sexual orientation, or gender identity.            Thank you!     Thank you for choosing [facility]  for your care. Our goal is always to provide you with excellent care. Hearing back from our patients is one way we can continue to improve our services. Please take a few minutes to complete the written survey that you may receive in the mail after your visit with us. Thank you!             Your Updated Medication List - Protect others around you: Learn how to safely use, store and throw away your medicines at www.disposemymeds.org.          This list is accurate as of 4/19/18  2:09 PM.  Always use your most recent med list.                   Brand Name Dispense Instructions for use Diagnosis    ACCU-CHEK SMARTVIEW test strip   Generic drug:  blood glucose monitoring      by In Vitro route daily Reported on 2/15/2017        apixaban ANTICOAGULANT 5 MG tablet    ELIQUIS    180 tablet    Take 1 tablet (5 mg) by mouth 2 times daily    Atrial fibrillation with RVR (H)       atorvastatin 20 MG tablet    LIPITOR     Take 10 mg by mouth daily        blood glucose monitoring lancets      1 each by In Vitro route daily Reported on 2/15/2017        clobetasol 0.05 % cream    TEMOVATE    30 g    APPLY TOPICALLY TWICE DAILY AS NEEDED FOR UP TO TWO WEEKS, THEN INTERMITTENLY.    Psoriasis of scalp       digoxin 125 MCG tablet    LANOXIN    90 tablet    Take 1 tablet (125 mcg) by mouth daily    Atrial fibrillation with RVR (H)       erythromycin ophthalmic ointment    ROMYCIN     Place 1 Application Into the left eye At Bedtime        glimepiride 4 MG tablet    AMARYL    90 tablet    TAKE 1 TABLET BY MOUTH EVERY MORNING BEFORE BREAKFAST    Type 2 diabetes mellitus without complication, without long-term current use of insulin (H)       hydrocortisone 1 % ointment     30 g    Apply sparingly to affected area.    Atrial fibrillation (H)        lisinopril 5 MG tablet    PRINIVIL/ZESTRIL    180 tablet    Take 1 tablet (5 mg) by mouth 2 times daily    Hypertension goal BP (blood pressure) < 130/80       metFORMIN 1000 MG tablet    GLUCOPHAGE    180 tablet    TAKE 1 TABLET BY MOUTH TWICE DAILY WITH MEALS    Type 2 diabetes mellitus without complication, without long-term current use of insulin (H)       metoprolol tartrate 100 MG tablet    LOPRESSOR    90 tablet    Take 1.5 tablets (150 mg) by mouth 2 times daily TAKE 1 1/2 TABLETS BY MOUTH TWICE DAILY    Hypertension goal BP (blood pressure) < 130/80       multivitamin, therapeutic with minerals Tabs tablet      Take 1 tablet by mouth daily.        order for DME     1 Device    CPAP every night  Needs small travel sized CPAP machine    Sleep apnea, unspecified type       sitagliptin 100 MG tablet    JANUVIA    90 tablet    Take 1 tablet (100 mg) by mouth daily    DM (diabetes mellitus) type II uncontrolled, periph vascular disorder (H)       TAZORAC 0.05 % Crea cream   Generic drug:  tazarotene      Externally apply  topically daily as needed. Indications: Plaque Psoriasis        tobramycin-dexamethasone 0.3-0.1 % ophthalmic susp    TOBRADEX     Place 1 drop Into the left eye 4 times daily        TYLENOL PO      Take 500 mg by mouth every 4 hours as needed

## 2018-04-19 NOTE — PATIENT INSTRUCTIONS
Start taking digoxin 0.125 mcg daily for rate control  Take lisinopril 5 mg twice daily  I will talk to Dr. Bird about rate control medications and what test to do for your chest discomfort.    If you don't hear from me by next Thursday. Please call.      You will receive all normal lab and testing results via [employer] or mail if not reviewed in clinic today. Please contact our office if you need assistance with setting up MyChart.    If you need a medication refill please contact your pharmacy. Please allow 3 business days for your refill to be completed.     As always, thank you for trusting us with your health care needs!    If you have any questions regarding your visit please contact your care team:   Cardiology  Telephone Number    Afib RNs  Nathaly Loja, and Jolene [phone]     Call for Electrophysiology procedure scheduling concerns  Ana Cheko [phone]   Device Clinic (Pacemakers, ICDs, Loop)   RN's :      Ruth Ricketts Eva, PAM Ferrell, Giuliana Mello During business hours:   [phone]
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
## 2018-04-30 ENCOUNTER — HOSPITAL ENCOUNTER (OUTPATIENT)
Dept: CARDIOLOGY | Facility: CLINIC | Age: 63
Discharge: HOME | End: 2018-04-30
Attending: NURSE PRACTITIONER | Admitting: NURSE PRACTITIONER
Payer: COMMERCIAL

## 2018-04-30 DIAGNOSIS — R07.89 CHEST TIGHTNESS: ICD-10-CM

## 2018-04-30 PROCEDURE — 93325 DOPPLER ECHO COLOR FLOW MAPG: CPT | Mod: 26 | Performed by: INTERNAL MEDICINE

## 2018-04-30 PROCEDURE — 93321 DOPPLER ECHO F-UP/LMTD STD: CPT | Mod: 26 | Performed by: INTERNAL MEDICINE

## 2018-04-30 PROCEDURE — 93016 CV STRESS TEST SUPVJ ONLY: CPT | Performed by: INTERNAL MEDICINE

## 2018-04-30 PROCEDURE — 93350 STRESS TTE ONLY: CPT | Mod: 26 | Performed by: INTERNAL MEDICINE

## 2018-04-30 PROCEDURE — 25500064 ZZH RX 255 OP 636: Performed by: NURSE PRACTITIONER

## 2018-04-30 PROCEDURE — 93018 CV STRESS TEST I&R ONLY: CPT | Performed by: INTERNAL MEDICINE

## 2018-04-30 PROCEDURE — 93325 DOPPLER ECHO COLOR FLOW MAPG: CPT | Mod: TC

## 2018-04-30 RX ADMIN — HUMAN ALBUMIN MICROSPHERES AND PERFLUTREN 7 ML: 10; .22 INJECTION, SOLUTION INTRAVENOUS at 13:39

## 2018-05-07 ENCOUNTER — TELEPHONE (OUTPATIENT)
Dept: CARDIOLOGY | Facility: CLINIC | Age: 63
End: 2018-05-07

## 2018-05-07 DIAGNOSIS — I10 BENIGN ESSENTIAL HYPERTENSION: Primary | ICD-10-CM

## 2018-05-07 RX ORDER — AMLODIPINE BESYLATE 5 MG/1
5 TABLET ORAL DAILY
Qty: 30 TABLET | Refills: 0 | Status: SHIPPED | OUTPATIENT
Start: 2018-05-07 | End: 2018-06-15

## 2018-05-07 NOTE — TELEPHONE ENCOUNTER
I left a  on [phone] asking patient to call the clinic and speak with AMANDA hernández nurses.  To discuss his stress test of which I spoke with Dr. Bird regarding the results.  Dr. Bird would like him to start on amlodipine 5 mg daily which will help his blood pressure and possibly his chest discomfort and neck pain.  (I sent a 30 day supply of the prescription to [facility]).   Take at night to decrease potential Lower extremity swelling.    And please continue to monitor symptoms.    Dr. Bird would like to see him in clinic in a month to reassess BP and symptoms of chest discomfort and neck pain with exercise (I ordered the visit but it is not currently scheduled).  EVONNE Avian, CNP

## 2018-05-08 ENCOUNTER — TELEPHONE (OUTPATIENT)
Dept: CARDIOLOGY | Facility: CLINIC | Age: 63
End: 2018-05-08

## 2018-05-08 NOTE — TELEPHONE ENCOUNTER
Phone call to patient and spoke to spouse (patient was in car driving).They were aware of the Rx for amlodipine but I explained to them that this is for BP control and chest/neck pain.I pointed out that it was difficult to make firm conclusions from stress test because of the short duration and his hypertensive response. They will be out of town till May 20 and will  the Rx at that time. I offered to send the Rx to an out of town pharmacy but they declined because they are not saying in one place long. I advised that amlodipine be taken at night. I made a return visit with Dr. Bird for 6/26 at 1:15.She verbalized agreement and understanding with this plan. Mara

## 2018-05-08 NOTE — TELEPHONE ENCOUNTER
----- Message from EVONNE Cade CNP sent at 5/7/2018  3:35 PM CDT -----  Regarding: This patient may call you back   I left a VM on [phone] asking patient to call the clinic and speak with AMANDA hernández nurses.  To discuss his stress test of which I spoke with Dr. Bird regarding the results.  Dr. Bird would like him to start on amlodipine 5 mg daily which will help his blood pressure and possibly his chest discomfort and neck pain.  (I sent a 30 day supply of the prescription to [facility]).   Take at night to decrease potential Lower extremity swelling.    And please continue to monitor symptoms.    Dr. Bird would like to see him in clinic in a month to reassess BP and symptoms of chest discomfort and neck pain with exercise (I ordered the visit but it is not currently scheduled).  EVONNE Avina, CNP

## 2018-06-15 DIAGNOSIS — I10 BENIGN ESSENTIAL HYPERTENSION: ICD-10-CM

## 2018-06-15 RX ORDER — AMLODIPINE BESYLATE 5 MG/1
5 TABLET ORAL DAILY
Qty: 30 TABLET | Refills: 0 | Status: SHIPPED | OUTPATIENT
Start: 2018-06-15 | End: 2018-11-08

## 2018-06-19 ENCOUNTER — OFFICE VISIT (OUTPATIENT)
Dept: FAMILY MEDICINE | Facility: CLINIC | Age: 63
End: 2018-06-19
Payer: COMMERCIAL

## 2018-06-19 VITALS
BODY MASS INDEX: 55.53 KG/M2 | OXYGEN SATURATION: 98 % | DIASTOLIC BLOOD PRESSURE: 78 MMHG | WEIGHT: 315 LBS | HEART RATE: 85 BPM | RESPIRATION RATE: 20 BRPM | TEMPERATURE: 97.9 F | SYSTOLIC BLOOD PRESSURE: 124 MMHG

## 2018-06-19 DIAGNOSIS — Z13.89 SCREENING FOR DIABETIC PERIPHERAL NEUROPATHY: ICD-10-CM

## 2018-06-19 DIAGNOSIS — I42.9 CARDIOMYOPATHY, UNSPECIFIED TYPE (H): ICD-10-CM

## 2018-06-19 DIAGNOSIS — E11.9 TYPE 2 DIABETES MELLITUS WITHOUT COMPLICATION, WITHOUT LONG-TERM CURRENT USE OF INSULIN (H): ICD-10-CM

## 2018-06-19 DIAGNOSIS — M65.30 TRIGGER FINGER, ACQUIRED: Primary | ICD-10-CM

## 2018-06-19 DIAGNOSIS — Z11.4 SCREENING FOR HIV (HUMAN IMMUNODEFICIENCY VIRUS): ICD-10-CM

## 2018-06-19 LAB — HBA1C MFR BLD: 7.3 % (ref 0–5.6)

## 2018-06-19 PROCEDURE — 87389 HIV-1 AG W/HIV-1&-2 AB AG IA: CPT | Performed by: FAMILY MEDICINE

## 2018-06-19 PROCEDURE — 99214 OFFICE O/P EST MOD 30 MIN: CPT | Mod: 25 | Performed by: FAMILY MEDICINE

## 2018-06-19 PROCEDURE — 36415 COLL VENOUS BLD VENIPUNCTURE: CPT | Performed by: FAMILY MEDICINE

## 2018-06-19 PROCEDURE — 80048 BASIC METABOLIC PNL TOTAL CA: CPT | Performed by: FAMILY MEDICINE

## 2018-06-19 PROCEDURE — 99207 C FOOT EXAM  NO CHARGE: CPT | Performed by: FAMILY MEDICINE

## 2018-06-19 PROCEDURE — 83036 HEMOGLOBIN GLYCOSYLATED A1C: CPT | Performed by: FAMILY MEDICINE

## 2018-06-19 PROCEDURE — 82043 UR ALBUMIN QUANTITATIVE: CPT | Performed by: FAMILY MEDICINE

## 2018-06-19 NOTE — MR AVS SNAPSHOT
After Visit Summary   6/19/2018    Demetri Booth    MRN: [id number]           Patient Information     Date Of Birth          1955        Visit Information        Provider Department      6/19/2018 10:45 AM Cheko Osborn MD [facility]        Today's Diagnoses     Trigger finger, acquired    -  1    Screening for HIV (human immunodeficiency virus)        Screening for diabetic peripheral neuropathy        Type 2 diabetes mellitus without complication, without long-term current use of insulin (H)        Cardiomyopathy, unspecified type (H)          Care Instructions    Work on diet, work to lose weight          Follow-ups after your visit        Additional Services     ORTHOPEDICS ADULT REFERRAL       Your provider has referred you to: [facility] - [place] [phone]   [email or web address]      Hand specialist    Please be aware that coverage of these services is subject to the terms and limitations of your health insurance plan.  Call member services at your health plan with any benefit or coverage questions.      Please bring the following to your appointment:    >>   Any x-rays, CTs or MRIs which have been performed.  Contact the facility where they were done to arrange for  prior to your scheduled appointment.    >>   List of current medications   >>   This referral request   >>   Any documents/labs given to you for this referral                  Your next 10 appointments already scheduled     Jun 26, 2018  1:15 PM CDT   [facility] EP RETURN with Chelsea Bird MD   [facility])    [address] [place] [id number] [phone] OPT 2              Who to contact     If you have questions or need follow up information about today's clinic visit or your schedule please contact [facility] directly at  [phone].  Normal or non-critical lab and imaging results will be communicated to you by MyChart, letter or phone within 4 business days after the clinic has received the results. If you do not hear from us within 7 days, please contact the clinic through [email or web address] or phone. If you have a critical or abnormal lab result, we will notify you by phone as soon as possible.  Submit refill requests through [employer] or call your pharmacy and they will forward the refill request to us. Please allow 3 business days for your refill to be completed.          Additional Information About Your Visit        [email or web address] Information     [employer] gives you secure access to your electronic health record. If you see a primary care provider, you can also send messages to your care team and make appointments. If you have questions, please call your primary care clinic.  If you do not have a primary care provider, please call [phone] and they will assist you.        Care EveryWhere ID     This is your Care EveryWhere ID. This could be used by other organizations to access your [place] medical records  [id number]        Your Vitals Were     Pulse Temperature Respirations Pulse Oximetry BMI (Body Mass Index)       85 97.9  F (36.6  C) (Tympanic) 20 98% 55.53 kg/m2        Blood Pressure from Last 3 Encounters:   06/19/18 124/78   04/19/18 (!) 149/94   03/12/18 135/89    Weight from Last 3 Encounters:   06/19/18 (!) 376 lb (170.6 kg)   04/19/18 (!) 368 lb 14.4 oz (167.3 kg)   02/23/18 (!) 362 lb (164.2 kg)              We Performed the Following     Albumin Random Urine Quantitative with Creat Ratio     Basic metabolic panel     FOOT EXAM  NO CHARGE [91408.114]     HEART FAILURE ACTION PLAN     HEMOGLOBIN A1C     HIV Screening     ORTHOPEDICS ADULT REFERRAL        Primary Care Provider Office Phone # Fax #    Cheko Osborn [phone] [address] [facility] [address]        Equal Access to Services      DELANEY [facility]: Hadii aad ku hadcristinoo Soryanali, waaxda luqadaha, qaybta kaalmada adeegyada, venus philipin hayaamanjeet mcmullen jareth andie . So [facility] [place] [facility] [phone].    ATENCIÓN: Si habla español, tiene a donaldson disposición servicios gratuitos de asistencia lingüística. Llame al [phone].    We comply with applicable federal civil rights laws and [place] laws. We do not discriminate on the basis of race, color, national origin, age, disability, sex, sexual orientation, or gender identity.            Thank you!     Thank you for choosing [facility]  for your care. Our goal is always to provide you with excellent care. Hearing back from our patients is one way we can continue to improve our services. Please take a few minutes to complete the written survey that you may receive in the mail after your visit with us. Thank you!             Your Updated Medication List - Protect others around you: Learn how to safely use, store and throw away your medicines at www.disposemymeds.org.          This list is accurate as of 6/19/18 11:03 AM.  Always use your most recent med list.                   Brand Name Dispense Instructions for use Diagnosis    ACCU-CHEK SMARTVIEW test strip   Generic drug:  blood glucose monitoring      by In Vitro route daily Reported on 2/15/2017        amLODIPine 5 MG tablet    NORVASC    30 tablet    Take 1 tablet (5 mg) by mouth daily    Benign essential hypertension       apixaban ANTICOAGULANT 5 MG tablet    ELIQUIS    180 tablet    Take 1 tablet (5 mg) by mouth 2 times daily    Atrial fibrillation with RVR (H)       atorvastatin 20 MG tablet    LIPITOR     Take 10 mg by mouth daily        blood glucose monitoring lancets      1 each by In Vitro route daily Reported on 2/15/2017        clobetasol 0.05 % cream    TEMOVATE    30 g    APPLY TOPICALLY TWICE DAILY AS NEEDED FOR UP TO TWO WEEKS, THEN INTERMITTENLY.    Psoriasis of scalp       erythromycin ophthalmic ointment     ROMYCIN     Place 1 Application Into the left eye At Bedtime        glimepiride 4 MG tablet    AMARYL    90 tablet    TAKE 1 TABLET BY MOUTH EVERY MORNING BEFORE BREAKFAST    Type 2 diabetes mellitus without complication, without long-term current use of insulin (H)       hydrocortisone 1 % ointment     30 g    Apply sparingly to affected area.    Atrial fibrillation (H)       lisinopril 5 MG tablet    PRINIVIL/ZESTRIL    180 tablet    Take 1 tablet (5 mg) by mouth 2 times daily    Hypertension goal BP (blood pressure) < 130/80       metFORMIN 1000 MG tablet    GLUCOPHAGE    180 tablet    TAKE 1 TABLET BY MOUTH TWICE DAILY WITH MEALS    Type 2 diabetes mellitus without complication, without long-term current use of insulin (H)       metoprolol tartrate 100 MG tablet    LOPRESSOR    90 tablet    Take 1.5 tablets (150 mg) by mouth 2 times daily TAKE 1 1/2 TABLETS BY MOUTH TWICE DAILY    Hypertension goal BP (blood pressure) < 130/80       multivitamin, therapeutic with minerals Tabs tablet      Take 1 tablet by mouth daily.        order for DME     1 Device    CPAP every night  Needs small travel sized CPAP machine    Sleep apnea, unspecified type       sitagliptin 100 MG tablet    JANUVIA    90 tablet    Take 1 tablet (100 mg) by mouth daily    DM (diabetes mellitus) type II uncontrolled, periph vascular disorder (H)       TAZORAC 0.05 % Crea cream   Generic drug:  tazarotene      Externally apply  topically daily as needed. Indications: Plaque Psoriasis        tobramycin-dexamethasone 0.3-0.1 % ophthalmic susp    TOBRADEX     Place 1 drop Into the left eye 4 times daily        TYLENOL PO      Take 500 mg by mouth every 4 hours as needed

## 2018-06-19 NOTE — PROGRESS NOTES
SUBJECTIVE:   Demetri Booth is a 63 year old male who presents to clinic today for the following health issues:    Patient here today with wife.      Musculoskeletal problem/pain      Duration: 2/2018    Description  Location: left pinky finger    Intensity:  moderate    Accompanying signs and symptoms: feels a popping when straightening it out    History  Previous similar problem: YES  Previous evaluation:  none    Precipitating or alleviating factors:  Trauma or overuse: YES- twisting something at work  Aggravating factors include: straightening    Therapies tried and outcome: nothing    Pain finger both when he flexes and then extends the finger      Diabetes Follow-up      Patient is checking blood sugars: not at all    Diabetic concerns: None     Symptoms of hypoglycemia (low blood sugar): none     Paresthesias (numbness or burning in feet) or sores: No     Date of last diabetic eye exam: 2017    Hyperlipidemia Follow-Up      Rate your low fat/cholesterol diet?: good    Taking statin?  Yes, no muscle aches from statin    Other lipid medications/supplements?:  none    Hypertension Follow-up      Outpatient blood pressures are not being checked.    Low Salt Diet: not monitoring salt. States he likes salt.    BP Readings from Last 2 Encounters:   06/19/18 124/78   04/19/18 (!) 149/94     Hemoglobin A1C (%)   Date Value   12/06/2017 6.8 (H)   04/12/2017 7.0 (H)     LDL Cholesterol Calculated (mg/dL)   Date Value   12/06/2017 86   04/12/2017 82         Amount of exercise or physical activity: None    Problems taking medications regularly: No    Medication side effects: Fatigue    Diet: low fat/cholesterol and diabetic    Pt has been wearing compression stockings on both legs, that has been helpful            Problem list and histories reviewed & adjusted, as indicated.  Additional history: as documented    Labs reviewed in EPIC    Reviewed and updated as needed this visit by clinical staff  Tobacco  Allergies   Meds  Problems  Med Hx  Surg Hx  Fam Hx  Soc Hx        Reviewed and updated as needed this visit by Provider  Allergies  Meds  Problems         ROS:  CONSTITUTIONAL:NEGATIVE for fever, chills, change in weight  INTEGUMENTARY/SKIN: rash scattered  RESP:NEGATIVE for significant cough or SOB  CV: POSITIVE for irregular heart beat  GI: NEGATIVE for nausea, abdominal pain, heartburn, or change in bowel habits  NEURO: NEGATIVE for weakness, dizziness or paresthesias  ENDOCRINE: NEGATIVE for temperature intolerance, skin/hair changes and POSITIVE  for HX diabetes  MS: Lt hand finger catches with flexion and extension    OBJECTIVE:                                                    /78 (Cuff Size: Adult Large)  Pulse 85  Temp 97.9  F (36.6  C) (Tympanic)  Resp 20  Wt (!) 376 lb (170.6 kg)  SpO2 98%  BMI 55.53 kg/m2  Body mass index is 55.53 kg/(m^2).  GENERAL APPEARANCE: healthy, alert and no distress  NECK: no adenopathy, no asymmetry, masses, or scars, thyroid normal to palpation and no bruits  RESP: lungs clear to auscultation - no rales, rhonchi or wheezes  CV: normal S1 S2, no S3 or S4, no murmur, click or rub and irregularly irregular rhythm  ABDOMEN: soft, nontender, without hepatosplenomegaly or masses and bowel sounds normal  MS: extremities normal- no gross deformities noted  Lt hand 5th finger has triggering with flexion and extension.  Tendon nodule palpable  DIABETIC FOOT EXAM: normal DP and PT pulses, no trophic changes or ulcerative lesions, normal sensory exam and normal monofilament exam    Diagnostic test results:  Lab: see below, results pending     ASSESSMENT/PLAN:                                                        ICD-10-CM    1. Trigger finger, acquired M65.30 ORTHOPEDICS ADULT REFERRAL    Lt 5th finger   2. Type 2 diabetes mellitus without complication, without long-term current use of insulin (H) E11.9 HEMOGLOBIN A1C     Basic metabolic panel     Albumin Random Urine  Quantitative with Creat Ratio   3. Cardiomyopathy, unspecified type (H) I42.9 HEART FAILURE ACTION PLAN     order for DME   4. Screening for HIV (human immunodeficiency virus) Z11.4 HIV Screening   5. Screening for diabetic peripheral neuropathy Z13.89 FOOT EXAM  NO CHARGE [68893.114]       Follow up with Provider -6 mo if numbers show ok control otherwise will need to recheck in 3 mo   Refer to [facility] to see hand specialist for finger triggering  Patient Instructions   Work on diet, work to lose weight      Cheko Osborn MD  [facility]

## 2018-06-19 NOTE — LETTER
"My Heart Failure Action Plan   Name: Demetri Booth    YOB: 1955   Date: 6/19/2018    My doctor: Cheko Osborn     [address] [address] [phone]  My Diagnosis: {HF TYPE:979976::\"Systolic Heart Failure\"}   My Ejection Fraction: {EF%:636637}    My Exercise Goal: 30 minutes daily  .     My Weight Goal: ***  Wt Readings from Last 2 Encounters:   04/19/18 (!) 368 lb 14.4 oz (167.3 kg)   02/23/18 (!) 362 lb (164.2 kg)     Weigh yourself daily using the same scale. If you gain more than 2 pounds in 24 hours or 5 pounds in a week {HF WEIGHT GOAL:994878}    My Diet Goal: {HF DIET GOAL:798538::\"No added salt\"}    Emergency Room Visits:    Our goal is to improve your quality of life and help you avoid a visit to the emergency room or hospital.  If we work together, we can achieve this goal. But, if you feel you need to call 911 or go to the emergency room, please do so.  If you go to the emergency room, please bring your list of medicines and your daily weight chart with you.       GREEN ZONE     Doing well today    Weight gained is no more than 2 pounds a day or 5 pounds a week.    No swelling in feet, ankles, legs or stomach.    No more swelling than usual.    No more trouble breathing than usual.    No change in my sleep.    No other problems. Actions:    I am doing fine.  I will take my medicine, follow my diet, see my doctor, exercise, and watch for symptoms.           YELLOW ZONE         Having a bad day or flare up    Weight gain of more than 2 pounds in one day or 5 pounds in one week.    New swelling in ankle, leg, knee or thigh.    Bloating in belly, pants feel tighter.    Swelling in hands or face.    Coughing or trouble breathing while walking or talking.    Harder to breathe last night.    Have trouble sleeping, wake up short of breath.    Much more tired than usual.    Not eating.    Pain in my chest or bad " leg cramps.    Feel weak or dizzy. Actions:    I need to take action and call my doctor or nurse today.                 RED ZONE         Need medical care now    Weight gain of 5 pounds overnight.    Chest pain or pressure that does not go away.    Feel less alert.    Wheezing or have trouble breathing when at rest.    Cannot sleep lying down.    Cannot take my water pill.    Pass out or faint. Actions:    I need to call my doctor or nurse now!    Call 911 if I have chest pain or cannot breathe.

## 2018-06-20 LAB
ANION GAP SERPL CALCULATED.3IONS-SCNC: 8 MMOL/L (ref 3–14)
BUN SERPL-MCNC: 13 MG/DL (ref 7–30)
CALCIUM SERPL-MCNC: 8.9 MG/DL (ref 8.5–10.1)
CHLORIDE SERPL-SCNC: 101 MMOL/L (ref 94–109)
CO2 SERPL-SCNC: 27 MMOL/L (ref 20–32)
CREAT SERPL-MCNC: 0.82 MG/DL (ref 0.66–1.25)
CREAT UR-MCNC: 45 MG/DL
GFR SERPL CREATININE-BSD FRML MDRD: >90 ML/MIN/1.7M2
GLUCOSE SERPL-MCNC: 263 MG/DL (ref 70–99)
HIV 1+2 AB+HIV1 P24 AG SERPL QL IA: NONREACTIVE
MICROALBUMIN UR-MCNC: 10 MG/L
MICROALBUMIN/CREAT UR: 22.39 MG/G CR (ref 0–17)
POTASSIUM SERPL-SCNC: 4.6 MMOL/L (ref 3.4–5.3)
SODIUM SERPL-SCNC: 136 MMOL/L (ref 133–144)

## 2018-06-26 ENCOUNTER — OFFICE VISIT (OUTPATIENT)
Dept: CARDIOLOGY | Facility: CLINIC | Age: 63
End: 2018-06-26
Payer: COMMERCIAL

## 2018-06-26 VITALS
SYSTOLIC BLOOD PRESSURE: 136 MMHG | WEIGHT: 315 LBS | HEART RATE: 98 BPM | HEIGHT: 69 IN | DIASTOLIC BLOOD PRESSURE: 88 MMHG | BODY MASS INDEX: 46.65 KG/M2

## 2018-06-26 DIAGNOSIS — I48.21 PERMANENT ATRIAL FIBRILLATION (H): ICD-10-CM

## 2018-06-26 DIAGNOSIS — I10 BENIGN ESSENTIAL HYPERTENSION: ICD-10-CM

## 2018-06-26 DIAGNOSIS — R07.2 PRECORDIAL PAIN: Primary | ICD-10-CM

## 2018-06-26 PROCEDURE — 93000 ELECTROCARDIOGRAM COMPLETE: CPT | Performed by: INTERNAL MEDICINE

## 2018-06-26 PROCEDURE — 99214 OFFICE O/P EST MOD 30 MIN: CPT | Performed by: INTERNAL MEDICINE
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
## 2018-06-26 NOTE — MR AVS SNAPSHOT
After Visit Summary   6/26/2018    Demetri Booth    MRN: [id number]           Patient Information     Date Of Birth          1955        Visit Information        Provider Department      6/26/2018 1:15 PM Chelsea Bird MD [facility]        Today's Diagnoses     Precordial pain    -  1    Benign essential hypertension        Permanent atrial fibrillation (H)           Follow-ups after your visit        Your next 10 appointments already scheduled     Jul 16, 2018 11:00 AM CDT   Cath 90 Minute with SHCVR2   [facility] Cardiac [facility]atheter[facility] ([facility])    [address]  [place] [address]   [phone]              Future tests that were ordered for you today     Open Future Orders        Priority Expected Expires Ordered    Cardiac Cath: Coronary Angiography Adult Order Routine 7/3/2018 6/26/2019 6/26/2018            Who to contact     If you have questions or need follow up information about today's clinic visit or your schedule please contact [facility]   [facility] directly at [phone].  Normal or non-critical lab and imaging results will be communicated to you by [employer], letter or phone within 4 business days after the clinic has received the results. If you do not hear from us within 7 days, please contact the clinic through [employer] or phone. If you have a critical or abnormal lab result, we will notify you by phone as soon as possible.  Submit refill requests through Mochila or call your pharmacy and they will forward the refill request to us. Please allow 3 business days for your refill to be completed.          Additional Information About Your Visit        MyChart Information     [employer]ila gives you secure access to your electronic health record. If you see a primary care provider, you can also send messages to your care team and make appointments. If you have questions,  "please call your primary care clinic.  If you do not have a primary care provider, please call [phone] and they will assist you.        Care EveryWhere ID     This is your Care EveryWhere ID. This could be used by other organizations to access your [place] medical records  [id number]        Your Vitals Were     Pulse Height BMI (Body Mass Index)             98 1.753 m (5' 9\") 55.35 kg/m2          Blood Pressure from Last 3 Encounters:   06/26/18 136/88   06/19/18 124/78   04/19/18 (!) 149/94    Weight from Last 3 Encounters:   06/26/18 (!) 170 kg (374 lb 12.8 oz)   06/19/18 (!) 170.6 kg (376 lb)   04/19/18 (!) 167.3 kg (368 lb 14.4 oz)              We Performed the Following     EKG 12-lead complete w/read - Clinics (performed today)     Follow-Up with Electrophysiologist        Primary Care Provider Office Phone # Fax #    Cheko Osborn [phone] [address] [facility] [address]        Equal Access to Services     [facility] : Hadii aad ku hadasho Soryanali, waaxda luqadaha, qaybta kaalmada adeegyada, venus encarnacion. So [facility] [phone].    ATENCIÓN: Si habla español, tiene a donaldson disposición servicios gratuitos de asistencia lingüística. Llame al [phone].    We comply with applicable federal civil rights laws and [place] laws. We do not discriminate on the basis of race, color, national origin, age, disability, sex, sexual orientation, or gender identity.            Thank you!     Thank you for choosing [facility]  for your care. Our goal is always to provide you with excellent care. Hearing back from our patients is one way we can continue to improve our services. Please take a few minutes to complete the written survey that you may receive in the mail after your visit with us. Thank you!             Your Updated Medication List - Protect others around you: Learn how to safely use, store and throw " away your medicines at www.disposemymeds.org.          This list is accurate as of 6/26/18  2:00 PM.  Always use your most recent med list.                   Brand Name Dispense Instructions for use Diagnosis    ACCU-CHEK SMARTVIEW test strip   Generic drug:  blood glucose monitoring      by In Vitro route daily Reported on 2/15/2017        amLODIPine 5 MG tablet    NORVASC    30 tablet    Take 1 tablet (5 mg) by mouth daily    Benign essential hypertension       apixaban ANTICOAGULANT 5 MG tablet    ELIQUIS    180 tablet    Take 1 tablet (5 mg) by mouth 2 times daily    Atrial fibrillation with RVR (H)       atorvastatin 20 MG tablet    LIPITOR     Take 10 mg by mouth daily        blood glucose monitoring lancets      1 each by In Vitro route daily Reported on 2/15/2017        clobetasol 0.05 % cream    TEMOVATE    30 g    APPLY TOPICALLY TWICE DAILY AS NEEDED FOR UP TO TWO WEEKS, THEN INTERMITTENLY.    Psoriasis of scalp       glimepiride 4 MG tablet    AMARYL    90 tablet    TAKE 1 TABLET BY MOUTH EVERY MORNING BEFORE BREAKFAST    Type 2 diabetes mellitus without complication, without long-term current use of insulin (H)       hydrocortisone 1 % ointment     30 g    Apply sparingly to affected area.    Atrial fibrillation (H)       lisinopril 5 MG tablet    PRINIVIL/ZESTRIL    180 tablet    Take 1 tablet (5 mg) by mouth 2 times daily    Hypertension goal BP (blood pressure) < 130/80       metFORMIN 1000 MG tablet    GLUCOPHAGE    180 tablet    TAKE 1 TABLET BY MOUTH TWICE DAILY WITH MEALS    Type 2 diabetes mellitus without complication, without long-term current use of insulin (H)       metoprolol tartrate 100 MG tablet    LOPRESSOR    90 tablet    Take 1.5 tablets (150 mg) by mouth 2 times daily TAKE 1 1/2 TABLETS BY MOUTH TWICE DAILY    Hypertension goal BP (blood pressure) < 130/80       multivitamin, therapeutic with minerals Tabs tablet      Take 1 tablet by mouth daily.        order for DME     1 Device     CPAP every night  Needs small travel sized CPAP machine    Sleep apnea, unspecified type       order for DME     2 Device    Equipment being ordered: JOBST compression stockings knee high 20-30 mm compression    Cardiomyopathy, unspecified type (H)       sitagliptin 100 MG tablet    JANUVIA    90 tablet    Take 1 tablet (100 mg) by mouth daily    DM (diabetes mellitus) type II uncontrolled, periph vascular disorder (H)       TAZORAC 0.05 % Crea cream   Generic drug:  tazarotene      Externally apply  topically daily as needed. Indications: Plaque Psoriasis        tobramycin-dexamethasone 0.3-0.1 % ophthalmic susp    TOBRADEX     Place 1 drop Into the left eye 4 times daily        TYLENOL PO      Take 500 mg by mouth every 4 hours as needed

## 2018-06-26 NOTE — LETTER
6/26/2018      Cheko Osborn MD  [address]  [facility] [address]      RE: Demetri Booth       Dear Colleague,    I had the pleasure of seeing Demetri Booth in the [facility].    Service Date: 06/26/2018      HISTORY OF PRESENT ILLNESS:    It was my pleasure seeing Mr. Demetri Booth, a very nice 63-year-old gentleman, in follow-up of:   A.  Permanent atrial fibrillation.  Treated with rate control (metoprolol 150 mg b.i.d.) and anticoagulation (apixaban).   B.  Mild chronic cardiomyopathy, EF typically 45%-50%.   C.  Severe obesity.   D.  Sleep apnea, on CPAP therapy.   E.  Diabetes mellitus type 2 for many years.   F.  Hypertension.      Demetri was last seen in our clinic by Luz Maria in April 2018.  He describes exertional chest and sometimes throat discomfort.  I do not recall him ever mentioning this symptom before.  He says it is rather predictable if he goes up 1 flight of stairs, less predictable if he walks on the ground level.  He has not had it at rest or waking him up at night.  It goes away with rest.  It does not radiate.      He has struggled with his blood pressure lately.  Luz Maria added amlodipine 5 mg daily back in April, and since then his blood pressure has improved.  Systolic blood pressure is now typically between 130 and 140 mmHg and diastolic between 80 and 90.  This is an improvement for him.      He remains unaware of his atrial fibrillation, and he uses his CPAP faithfully.  He has not had any bleeding issues on Eliquis.  He started getting neuropathic discomfort on the soles of his feet.      PHYSICAL EXAMINATION:   VITAL SIGNS:  Blood pressure 136/88, pulse 90s and irregular, weight 107 kilos (374 pounds), height 175 cm.   GENERAL:  He is a friendly gentleman with severe obesity.  He is accompanied by his spouse.   HEENT:  Normocephalic.   NECK:  Very, very thick, supple.  It is impossible to discern jugular venous pressure.   LUNGS:  Distant breath sounds.   No apparent crackles or wheezes.   CARDIOVASCULAR:  Irregular rhythm in the 90s.  No apparent gallop or murmur.   ABDOMEN:  Severely obese.   EXTREMITIES:  1 to 2+ edema with venous stasis dermatitis changes.      DIAGNOSTIC STUDIES:    The patient had a stress echocardiogram in 04/2018.  He only went for 4 minutes on a Francis protocol.  He developed neck and throat tightness with exercise.  The study was, unfortunately, technically difficult.  There were no apparent wall motion abnormalities but with very limited images.      IMPRESSION:   1.  Predictable exertional chest and throat discomfort despite high dose of beta blockers, concerning for angina.  Demetri has multiple risk factors for CAD, importantly diabetes mellitus for many years.  His recent stress echocardiogram was non-diagnostic but concerning in the sense that he developed significant chest and throat tightness after only a couple of minutes on the treadmill.      I do not think that further noninvasive assessment in this very large man would be of much value.  I have recommended coronary angiography via the radial approach.  I discussed with the patient the risks and benefits of this diagnostic procedure.  I explained there is an at least 1% risk of serious complication, probably higher in his case given his severe obesity.  If coronary intervention is required, there would be additional risk involved.  Potential risks include but are not limited to bleeding, arterial injury, stroke, coronary artery dissection requiring bypass surgery, cardiac perforation, reaction to contrast agent, etc.      The patient understands and agrees to proceed.  He will hold Eliquis for 3 doses before the cardiac cath.      2.  Hypertension.  Improved after addition of amlodipine.   3.  Permanent atrial fibrillation.       RECOMMENDATIONS:   A.  Cardiac catheterization.  Hold Eliquis, as above.   B.  Follow-up visit to be arranged later on.      Thank you for the opportunity  to be part of his care.        LITTLE AUGUST MD, FACC         cc:   [place] [address]             D: 2018   T: 2018   MT: OLIVE      Name:     ZARIA MOONEY   MRN:      [id number]        Account:      [id number]   :      1955           Service Date: 2018      Document: [id number]           Outpatient Encounter Prescriptions as of 2018   Medication Sig Dispense Refill     Acetaminophen (TYLENOL PO) Take 500 mg by mouth every 4 hours as needed        amLODIPine (NORVASC) 5 MG tablet Take 1 tablet (5 mg) by mouth daily 30 tablet 0     apixaban ANTICOAGULANT (ELIQUIS) 5 MG tablet Take 1 tablet (5 mg) by mouth 2 times daily 180 tablet 3     blood glucose (ACCU-CHEK SMARTVIEW) test strip by In Vitro route daily Reported on 2/15/2017       clobetasol (TEMOVATE) 0.05 % cream APPLY TOPICALLY TWICE DAILY AS NEEDED FOR UP TO TWO WEEKS, THEN INTERMITTENLY. 30 g 3     glimepiride (AMARYL) 4 MG tablet TAKE 1 TABLET BY MOUTH EVERY MORNING BEFORE BREAKFAST 90 tablet 1     hydrocortisone 1 % ointment Apply sparingly to affected area. 30 g 0     lisinopril (PRINIVIL/ZESTRIL) 5 MG tablet Take 1 tablet (5 mg) by mouth 2 times daily 180 tablet 3     metFORMIN (GLUCOPHAGE) 1000 MG tablet TAKE 1 TABLET BY MOUTH TWICE DAILY WITH MEALS 180 tablet 1     metoprolol tartrate (LOPRESSOR) 100 MG tablet Take 1.5 tablets (150 mg) by mouth 2 times daily TAKE 1 1/2 TABLETS BY MOUTH TWICE DAILY 90 tablet 3     multivitamin, therapeutic with minerals (THERA-VIT-M) TABS Take 1 tablet by mouth daily.       order for DME Equipment being ordered: JOBST compression stockings knee high 20-30 mm compression 2 Device 1     order for DME CPAP every night   Needs small travel sized CPAP machine 1 Device 0     sitagliptin (JANUVIA) 100 MG tablet Take 1 tablet (100 mg) by mouth daily 90 tablet 1     Tazarotene (TAZORAC) 0.05 % CREA Externally  apply  topically daily as needed. Indications: Plaque Psoriasis       tobramycin-dexamethasone (TOBRADEX) 0.3-0.1 % ophthalmic susp Place 1 drop Into the left eye 4 times daily  0     atorvastatin (LIPITOR) 20 MG tablet Take 10 mg by mouth daily       blood glucose monitoring (ACCU-CHEK FASTCLIX) lancets 1 each by In Vitro route daily Reported on 2/15/2017       [DISCONTINUED] erythromycin (ROMYCIN) ophthalmic ointment Place 1 Application Into the left eye At Bedtime  2     No facility-administered encounter medications on file as of 6/26/2018.        Again, thank you for allowing me to participate in the care of your patient.      Sincerely,    Chelsea Bird MD     [facility]

## 2018-06-26 NOTE — PROGRESS NOTES
Service Date: 06/26/2018      HISTORY OF PRESENT ILLNESS:    It was my pleasure seeing Mr. Demetri Booth, a very nice 63-year-old gentleman, in follow-up of:   A.  Permanent atrial fibrillation.  Treated with rate control (metoprolol 150 mg b.i.d.) and anticoagulation (apixaban).   B.  Mild chronic cardiomyopathy, EF typically 45%-50%.   C.  Severe obesity.   D.  Sleep apnea, on CPAP therapy.   E.  Diabetes mellitus type 2 for many years.   F.  Hypertension.      Demetri was last seen in our clinic by Luz Maria in April 2018.  He describes exertional chest and sometimes throat discomfort.  I do not recall him ever mentioning this symptom before.  He says it is rather predictable if he goes up 1 flight of stairs, less predictable if he walks on the ground level.  He has not had it at rest or waking him up at night.  It goes away with rest.  It does not radiate.      He has struggled with his blood pressure lately.  Luz Maria added amlodipine 5 mg daily back in April, and since then his blood pressure has improved.  Systolic blood pressure is now typically between 130 and 140 mmHg and diastolic between 80 and 90.  This is an improvement for him.      He remains unaware of his atrial fibrillation, and he uses his CPAP faithfully.  He has not had any bleeding issues on Eliquis.  He started getting neuropathic discomfort on the soles of his feet.      PHYSICAL EXAMINATION:   VITAL SIGNS:  Blood pressure 136/88, pulse 90s and irregular, weight 107 kilos (374 pounds), height 175 cm.   GENERAL:  He is a friendly gentleman with severe obesity.  He is accompanied by his spouse.   HEENT:  Normocephalic.   NECK:  Very, very thick, supple.  It is impossible to discern jugular venous pressure.   LUNGS:  Distant breath sounds.  No apparent crackles or wheezes.   CARDIOVASCULAR:  Irregular rhythm in the 90s.  No apparent gallop or murmur.   ABDOMEN:  Severely obese.   EXTREMITIES:  1 to 2+ edema with venous stasis dermatitis changes.       DIAGNOSTIC STUDIES:    The patient had a stress echocardiogram in 04/2018.  He only went for 4 minutes on a Francis protocol.  He developed neck and throat tightness with exercise.  The study was, unfortunately, technically difficult.  There were no apparent wall motion abnormalities but with very limited images.      IMPRESSION:   1.  Predictable exertional chest and throat discomfort despite high dose of beta blockers, concerning for angina.  Demetri has multiple risk factors for CAD, importantly diabetes mellitus for many years.  His recent stress echocardiogram was non-diagnostic but concerning in the sense that he developed significant chest and throat tightness after only a couple of minutes on the treadmill.      I do not think that further noninvasive assessment in this very large man would be of much value.  I have recommended coronary angiography via the radial approach.  I discussed with the patient the risks and benefits of this diagnostic procedure.  I explained there is an at least 1% risk of serious complication, probably higher in his case given his severe obesity.  If coronary intervention is required, there would be additional risk involved.  Potential risks include but are not limited to bleeding, arterial injury, stroke, coronary artery dissection requiring bypass surgery, cardiac perforation, reaction to contrast agent, etc.      The patient understands and agrees to proceed.  He will hold Eliquis for 3 doses before the cardiac cath.      2.  Hypertension.  Improved after addition of amlodipine.   3.  Permanent atrial fibrillation.       RECOMMENDATIONS:   A.  Cardiac catheterization.  Hold Eliquis, as above.   B.  Follow-up visit to be arranged later on.      Thank you for the opportunity to be part of his care.        LITTLE AUGUST MD, FACC         cc:   [place] [address]             D: 06/26/2018   T:  2018   MT: OLIVE      Name:     ZARIA MOONEY   MRN:      [id number]        Account:      [id number]   :      1955           Service Date: 2018      Document: [id number]
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
## 2018-07-09 ENCOUNTER — TRANSFERRED RECORDS (OUTPATIENT)
Dept: HEALTH INFORMATION MANAGEMENT | Facility: CLINIC | Age: 63
End: 2018-07-09

## 2018-07-13 ENCOUNTER — TELEPHONE (OUTPATIENT)
Dept: CARDIOLOGY | Facility: CLINIC | Age: 63
End: 2018-07-13

## 2018-07-13 DIAGNOSIS — Z98.890 S/P CORONARY ANGIOGRAM: Primary | ICD-10-CM

## 2018-07-13 RX ORDER — LIDOCAINE 40 MG/G
CREAM TOPICAL
Status: CANCELLED | OUTPATIENT
Start: 2018-07-13

## 2018-07-13 RX ORDER — SODIUM CHLORIDE 9 MG/ML
INJECTION, SOLUTION INTRAVENOUS CONTINUOUS
Status: CANCELLED | OUTPATIENT
Start: 2018-07-13

## 2018-07-13 RX ORDER — POTASSIUM CHLORIDE 1500 MG/1
20 TABLET, EXTENDED RELEASE ORAL
Status: CANCELLED | OUTPATIENT
Start: 2018-07-13

## 2018-07-16 ENCOUNTER — APPOINTMENT (OUTPATIENT)
Dept: CARDIOLOGY | Facility: CLINIC | Age: 63
End: 2018-07-16
Attending: INTERNAL MEDICINE
Payer: COMMERCIAL

## 2018-07-16 ENCOUNTER — HOSPITAL ENCOUNTER (OUTPATIENT)
Facility: CLINIC | Age: 63
Discharge: HOME | End: 2018-07-16
Attending: INTERNAL MEDICINE | Admitting: INTERNAL MEDICINE
Payer: COMMERCIAL

## 2018-07-16 VITALS
SYSTOLIC BLOOD PRESSURE: 135 MMHG | RESPIRATION RATE: 20 BRPM | OXYGEN SATURATION: 96 % | HEIGHT: 70 IN | DIASTOLIC BLOOD PRESSURE: 93 MMHG | TEMPERATURE: 96.3 F | WEIGHT: 315 LBS | HEART RATE: 90 BPM | BODY MASS INDEX: 45.1 KG/M2

## 2018-07-16 DIAGNOSIS — R07.2 PRECORDIAL PAIN: ICD-10-CM

## 2018-07-16 DIAGNOSIS — I10 BENIGN ESSENTIAL HYPERTENSION: ICD-10-CM

## 2018-07-16 LAB
ANION GAP SERPL CALCULATED.3IONS-SCNC: 8 MMOL/L (ref 3–14)
APTT PPP: 30 SEC (ref 22–37)
BUN SERPL-MCNC: 10 MG/DL (ref 7–30)
CALCIUM SERPL-MCNC: 8.4 MG/DL (ref 8.5–10.1)
CHLORIDE SERPL-SCNC: 103 MMOL/L (ref 94–109)
CO2 SERPL-SCNC: 27 MMOL/L (ref 20–32)
CREAT SERPL-MCNC: 0.84 MG/DL (ref 0.66–1.25)
ERYTHROCYTE [DISTWIDTH] IN BLOOD BY AUTOMATED COUNT: 13.6 % (ref 10–15)
GFR SERPL CREATININE-BSD FRML MDRD: >90 ML/MIN/1.7M2
GLUCOSE SERPL-MCNC: 172 MG/DL (ref 70–99)
HCT VFR BLD AUTO: 45.2 % (ref 40–53)
HGB BLD-MCNC: 15.8 G/DL (ref 13.3–17.7)
INR PPP: 1.27 (ref 0.86–1.14)
MCH RBC QN AUTO: 34.3 PG (ref 26.5–33)
MCHC RBC AUTO-ENTMCNC: 35 G/DL (ref 31.5–36.5)
MCV RBC AUTO: 98 FL (ref 78–100)
PLATELET # BLD AUTO: 115 10E9/L (ref 150–450)
POTASSIUM SERPL-SCNC: 4.1 MMOL/L (ref 3.4–5.3)
RBC # BLD AUTO: 4.6 10E12/L (ref 4.4–5.9)
SODIUM SERPL-SCNC: 138 MMOL/L (ref 133–144)
WBC # BLD AUTO: 5.2 10E9/L (ref 4–11)

## 2018-07-16 PROCEDURE — 40000065 ZZH STATISTIC EKG NON-CHARGEABLE

## 2018-07-16 PROCEDURE — 27210787 ZZH MANIFOLD CR2

## 2018-07-16 PROCEDURE — 25000128 H RX IP 250 OP 636: Performed by: INTERNAL MEDICINE

## 2018-07-16 PROCEDURE — 40000852 ZZH STATISTIC HEART CATH LAB OR EP LAB

## 2018-07-16 PROCEDURE — 93010 ELECTROCARDIOGRAM REPORT: CPT | Performed by: INTERNAL MEDICINE

## 2018-07-16 PROCEDURE — C1769 GUIDE WIRE: HCPCS

## 2018-07-16 PROCEDURE — 25000125 ZZHC RX 250: Performed by: INTERNAL MEDICINE

## 2018-07-16 PROCEDURE — 99152 MOD SED SAME PHYS/QHP 5/>YRS: CPT | Performed by: INTERNAL MEDICINE

## 2018-07-16 PROCEDURE — 27210914 ZZH SHEATH CR8

## 2018-07-16 PROCEDURE — 93458 L HRT ARTERY/VENTRICLE ANGIO: CPT | Mod: 26 | Performed by: INTERNAL MEDICINE

## 2018-07-16 PROCEDURE — 25000132 ZZH RX MED GY IP 250 OP 250 PS 637: Performed by: INTERNAL MEDICINE

## 2018-07-16 PROCEDURE — 80048 BASIC METABOLIC PNL TOTAL CA: CPT | Performed by: INTERNAL MEDICINE

## 2018-07-16 PROCEDURE — 27210946 ZZH KIT HC TOTES DISP CR8

## 2018-07-16 PROCEDURE — 93458 L HRT ARTERY/VENTRICLE ANGIO: CPT

## 2018-07-16 PROCEDURE — 85027 COMPLETE CBC AUTOMATED: CPT | Performed by: INTERNAL MEDICINE

## 2018-07-16 PROCEDURE — 99153 MOD SED SAME PHYS/QHP EA: CPT

## 2018-07-16 PROCEDURE — 27210795 ZZH PAD DEFIB QUICK CR4

## 2018-07-16 PROCEDURE — 40000235 ZZH STATISTIC TELEMETRY

## 2018-07-16 PROCEDURE — 85610 PROTHROMBIN TIME: CPT | Performed by: INTERNAL MEDICINE

## 2018-07-16 PROCEDURE — 27211089 ZZH KIT ACIST INJECTOR CR3

## 2018-07-16 PROCEDURE — 99152 MOD SED SAME PHYS/QHP 5/>YRS: CPT

## 2018-07-16 PROCEDURE — 93005 ELECTROCARDIOGRAM TRACING: CPT

## 2018-07-16 PROCEDURE — 36415 COLL VENOUS BLD VENIPUNCTURE: CPT

## 2018-07-16 PROCEDURE — 85730 THROMBOPLASTIN TIME PARTIAL: CPT | Performed by: INTERNAL MEDICINE

## 2018-07-16 RX ORDER — PRASUGREL 10 MG/1
10-60 TABLET, FILM COATED ORAL
Status: DISCONTINUED | OUTPATIENT
Start: 2018-07-16 | End: 2018-07-16 | Stop reason: HOSPADM

## 2018-07-16 RX ORDER — BUPIVACAINE HYDROCHLORIDE 2.5 MG/ML
1-10 INJECTION, SOLUTION EPIDURAL; INFILTRATION; INTRACAUDAL
Status: DISCONTINUED | OUTPATIENT
Start: 2018-07-16 | End: 2018-07-16 | Stop reason: HOSPADM

## 2018-07-16 RX ORDER — METOPROLOL TARTRATE 1 MG/ML
5 INJECTION, SOLUTION INTRAVENOUS EVERY 5 MIN PRN
Status: DISCONTINUED | OUTPATIENT
Start: 2018-07-16 | End: 2018-07-16 | Stop reason: HOSPADM

## 2018-07-16 RX ORDER — NITROGLYCERIN 20 MG/100ML
.07-1.19 INJECTION INTRAVENOUS CONTINUOUS PRN
Status: DISCONTINUED | OUTPATIENT
Start: 2018-07-16 | End: 2018-07-16 | Stop reason: HOSPADM

## 2018-07-16 RX ORDER — HYDRALAZINE HYDROCHLORIDE 20 MG/ML
10-20 INJECTION INTRAMUSCULAR; INTRAVENOUS
Status: DISCONTINUED | OUTPATIENT
Start: 2018-07-16 | End: 2018-07-16 | Stop reason: HOSPADM

## 2018-07-16 RX ORDER — CLOPIDOGREL 300 MG/1
300-600 TABLET, FILM COATED ORAL
Status: DISCONTINUED | OUTPATIENT
Start: 2018-07-16 | End: 2018-07-16 | Stop reason: HOSPADM

## 2018-07-16 RX ORDER — SODIUM NITROPRUSSIDE 25 MG/ML
100-200 INJECTION INTRAVENOUS
Status: DISCONTINUED | OUTPATIENT
Start: 2018-07-16 | End: 2018-07-16 | Stop reason: HOSPADM

## 2018-07-16 RX ORDER — LIDOCAINE HYDROCHLORIDE 10 MG/ML
30 INJECTION, SOLUTION EPIDURAL; INFILTRATION; INTRACAUDAL; PERINEURAL
Status: DISCONTINUED | OUTPATIENT
Start: 2018-07-16 | End: 2018-07-16 | Stop reason: HOSPADM

## 2018-07-16 RX ORDER — HEPARIN SODIUM 1000 [USP'U]/ML
1000-10000 INJECTION, SOLUTION INTRAVENOUS; SUBCUTANEOUS EVERY 5 MIN PRN
Status: DISCONTINUED | OUTPATIENT
Start: 2018-07-16 | End: 2018-07-16 | Stop reason: HOSPADM

## 2018-07-16 RX ORDER — NITROGLYCERIN 5 MG/ML
100-500 VIAL (ML) INTRAVENOUS
Status: COMPLETED | OUTPATIENT
Start: 2018-07-16 | End: 2018-07-16

## 2018-07-16 RX ORDER — NITROGLYCERIN 0.4 MG/1
0.4 TABLET SUBLINGUAL EVERY 5 MIN PRN
Status: DISCONTINUED | OUTPATIENT
Start: 2018-07-16 | End: 2018-07-16 | Stop reason: HOSPADM

## 2018-07-16 RX ORDER — DOBUTAMINE HYDROCHLORIDE 200 MG/100ML
2-20 INJECTION INTRAVENOUS CONTINUOUS PRN
Status: DISCONTINUED | OUTPATIENT
Start: 2018-07-16 | End: 2018-07-16 | Stop reason: HOSPADM

## 2018-07-16 RX ORDER — NALOXONE HYDROCHLORIDE 0.4 MG/ML
.2-.4 INJECTION, SOLUTION INTRAMUSCULAR; INTRAVENOUS; SUBCUTANEOUS
Status: DISCONTINUED | OUTPATIENT
Start: 2018-07-16 | End: 2018-07-16 | Stop reason: HOSPADM

## 2018-07-16 RX ORDER — ASPIRIN 325 MG
325 TABLET ORAL
Status: DISCONTINUED | OUTPATIENT
Start: 2018-07-16 | End: 2018-07-16 | Stop reason: HOSPADM

## 2018-07-16 RX ORDER — DOPAMINE HYDROCHLORIDE 160 MG/100ML
2-20 INJECTION, SOLUTION INTRAVENOUS CONTINUOUS PRN
Status: DISCONTINUED | OUTPATIENT
Start: 2018-07-16 | End: 2018-07-16 | Stop reason: HOSPADM

## 2018-07-16 RX ORDER — HYDROCODONE BITARTRATE AND ACETAMINOPHEN 5; 325 MG/1; MG/1
1-2 TABLET ORAL EVERY 4 HOURS PRN
Status: DISCONTINUED | OUTPATIENT
Start: 2018-07-16 | End: 2018-07-16 | Stop reason: HOSPADM

## 2018-07-16 RX ORDER — SODIUM CHLORIDE 9 MG/ML
INJECTION, SOLUTION INTRAVENOUS CONTINUOUS
Status: DISCONTINUED | OUTPATIENT
Start: 2018-07-16 | End: 2018-07-16 | Stop reason: HOSPADM

## 2018-07-16 RX ORDER — ASPIRIN 81 MG/1
81-324 TABLET, CHEWABLE ORAL
Status: DISCONTINUED | OUTPATIENT
Start: 2018-07-16 | End: 2018-07-16 | Stop reason: HOSPADM

## 2018-07-16 RX ORDER — NALOXONE HYDROCHLORIDE 0.4 MG/ML
.1-.4 INJECTION, SOLUTION INTRAMUSCULAR; INTRAVENOUS; SUBCUTANEOUS
Status: DISCONTINUED | OUTPATIENT
Start: 2018-07-16 | End: 2018-07-16 | Stop reason: HOSPADM

## 2018-07-16 RX ORDER — ATROPINE SULFATE 0.1 MG/ML
.5-1 INJECTION INTRAVENOUS
Status: DISCONTINUED | OUTPATIENT
Start: 2018-07-16 | End: 2018-07-16 | Stop reason: HOSPADM

## 2018-07-16 RX ORDER — LORAZEPAM 2 MG/ML
.5-2 INJECTION INTRAMUSCULAR EVERY 4 HOURS PRN
Status: DISCONTINUED | OUTPATIENT
Start: 2018-07-16 | End: 2018-07-16 | Stop reason: HOSPADM

## 2018-07-16 RX ORDER — FUROSEMIDE 10 MG/ML
20-100 INJECTION INTRAMUSCULAR; INTRAVENOUS
Status: DISCONTINUED | OUTPATIENT
Start: 2018-07-16 | End: 2018-07-16 | Stop reason: HOSPADM

## 2018-07-16 RX ORDER — ACETAMINOPHEN 325 MG/1
325-650 TABLET ORAL EVERY 4 HOURS PRN
Status: DISCONTINUED | OUTPATIENT
Start: 2018-07-16 | End: 2018-07-16 | Stop reason: HOSPADM

## 2018-07-16 RX ORDER — ADENOSINE 3 MG/ML
12-12000 INJECTION, SOLUTION INTRAVENOUS
Status: DISCONTINUED | OUTPATIENT
Start: 2018-07-16 | End: 2018-07-16 | Stop reason: HOSPADM

## 2018-07-16 RX ORDER — LIDOCAINE 40 MG/G
CREAM TOPICAL
Status: DISCONTINUED | OUTPATIENT
Start: 2018-07-16 | End: 2018-07-16 | Stop reason: HOSPADM

## 2018-07-16 RX ORDER — CLOPIDOGREL BISULFATE 75 MG/1
75 TABLET ORAL
Status: DISCONTINUED | OUTPATIENT
Start: 2018-07-16 | End: 2018-07-16 | Stop reason: HOSPADM

## 2018-07-16 RX ORDER — DEXTROSE MONOHYDRATE 25 G/50ML
12.5-5 INJECTION, SOLUTION INTRAVENOUS EVERY 30 MIN PRN
Status: DISCONTINUED | OUTPATIENT
Start: 2018-07-16 | End: 2018-07-16 | Stop reason: HOSPADM

## 2018-07-16 RX ORDER — IOPAMIDOL 755 MG/ML
40 INJECTION, SOLUTION INTRAVASCULAR ONCE
Status: COMPLETED | OUTPATIENT
Start: 2018-07-16 | End: 2018-07-16

## 2018-07-16 RX ORDER — NIFEDIPINE 10 MG/1
10 CAPSULE ORAL
Status: DISCONTINUED | OUTPATIENT
Start: 2018-07-16 | End: 2018-07-16 | Stop reason: HOSPADM

## 2018-07-16 RX ORDER — PROTAMINE SULFATE 10 MG/ML
1-5 INJECTION, SOLUTION INTRAVENOUS
Status: DISCONTINUED | OUTPATIENT
Start: 2018-07-16 | End: 2018-07-16 | Stop reason: HOSPADM

## 2018-07-16 RX ORDER — MORPHINE SULFATE 2 MG/ML
1-2 INJECTION, SOLUTION INTRAMUSCULAR; INTRAVENOUS EVERY 5 MIN PRN
Status: DISCONTINUED | OUTPATIENT
Start: 2018-07-16 | End: 2018-07-16 | Stop reason: HOSPADM

## 2018-07-16 RX ORDER — POTASSIUM CHLORIDE 29.8 MG/ML
20 INJECTION INTRAVENOUS
Status: DISCONTINUED | OUTPATIENT
Start: 2018-07-16 | End: 2018-07-16 | Stop reason: HOSPADM

## 2018-07-16 RX ORDER — POTASSIUM CHLORIDE 7.45 MG/ML
10 INJECTION INTRAVENOUS
Status: DISCONTINUED | OUTPATIENT
Start: 2018-07-16 | End: 2018-07-16 | Stop reason: HOSPADM

## 2018-07-16 RX ORDER — FENTANYL CITRATE 50 UG/ML
25-50 INJECTION, SOLUTION INTRAMUSCULAR; INTRAVENOUS
Status: DISCONTINUED | OUTPATIENT
Start: 2018-07-16 | End: 2018-07-16 | Stop reason: HOSPADM

## 2018-07-16 RX ORDER — NITROGLYCERIN 5 MG/ML
100-200 VIAL (ML) INTRAVENOUS
Status: DISCONTINUED | OUTPATIENT
Start: 2018-07-16 | End: 2018-07-16 | Stop reason: HOSPADM

## 2018-07-16 RX ORDER — PHENYLEPHRINE HCL IN 0.9% NACL 1 MG/10 ML
20-100 SYRINGE (ML) INTRAVENOUS
Status: DISCONTINUED | OUTPATIENT
Start: 2018-07-16 | End: 2018-07-16 | Stop reason: HOSPADM

## 2018-07-16 RX ORDER — ATROPINE SULFATE 0.1 MG/ML
0.5 INJECTION INTRAVENOUS EVERY 5 MIN PRN
Status: DISCONTINUED | OUTPATIENT
Start: 2018-07-16 | End: 2018-07-16 | Stop reason: HOSPADM

## 2018-07-16 RX ORDER — ONDANSETRON 2 MG/ML
4 INJECTION INTRAMUSCULAR; INTRAVENOUS EVERY 4 HOURS PRN
Status: DISCONTINUED | OUTPATIENT
Start: 2018-07-16 | End: 2018-07-16 | Stop reason: HOSPADM

## 2018-07-16 RX ORDER — METHYLPREDNISOLONE SODIUM SUCCINATE 125 MG/2ML
125 INJECTION, POWDER, LYOPHILIZED, FOR SOLUTION INTRAMUSCULAR; INTRAVENOUS
Status: DISCONTINUED | OUTPATIENT
Start: 2018-07-16 | End: 2018-07-16 | Stop reason: HOSPADM

## 2018-07-16 RX ORDER — PROTAMINE SULFATE 10 MG/ML
25-100 INJECTION, SOLUTION INTRAVENOUS EVERY 5 MIN PRN
Status: DISCONTINUED | OUTPATIENT
Start: 2018-07-16 | End: 2018-07-16 | Stop reason: HOSPADM

## 2018-07-16 RX ORDER — ENALAPRILAT 1.25 MG/ML
1.25-2.5 INJECTION INTRAVENOUS
Status: DISCONTINUED | OUTPATIENT
Start: 2018-07-16 | End: 2018-07-16 | Stop reason: HOSPADM

## 2018-07-16 RX ORDER — POTASSIUM CHLORIDE 1500 MG/1
20 TABLET, EXTENDED RELEASE ORAL
Status: DISCONTINUED | OUTPATIENT
Start: 2018-07-16 | End: 2018-07-16 | Stop reason: HOSPADM

## 2018-07-16 RX ORDER — FLUMAZENIL 0.1 MG/ML
0.2 INJECTION, SOLUTION INTRAVENOUS
Status: DISCONTINUED | OUTPATIENT
Start: 2018-07-16 | End: 2018-07-16 | Stop reason: HOSPADM

## 2018-07-16 RX ORDER — EPINEPHRINE 1 MG/ML
0.3 INJECTION, SOLUTION, CONCENTRATE INTRAVENOUS
Status: DISCONTINUED | OUTPATIENT
Start: 2018-07-16 | End: 2018-07-16 | Stop reason: HOSPADM

## 2018-07-16 RX ORDER — NICARDIPINE HYDROCHLORIDE 2.5 MG/ML
100 INJECTION INTRAVENOUS
Status: DISCONTINUED | OUTPATIENT
Start: 2018-07-16 | End: 2018-07-16 | Stop reason: HOSPADM

## 2018-07-16 RX ORDER — VERAPAMIL HYDROCHLORIDE 2.5 MG/ML
1-2.5 INJECTION, SOLUTION INTRAVENOUS
Status: COMPLETED | OUTPATIENT
Start: 2018-07-16 | End: 2018-07-16

## 2018-07-16 RX ORDER — NALOXONE HYDROCHLORIDE 0.4 MG/ML
0.4 INJECTION, SOLUTION INTRAMUSCULAR; INTRAVENOUS; SUBCUTANEOUS EVERY 5 MIN PRN
Status: DISCONTINUED | OUTPATIENT
Start: 2018-07-16 | End: 2018-07-16 | Stop reason: HOSPADM

## 2018-07-16 RX ORDER — DIPHENHYDRAMINE HYDROCHLORIDE 50 MG/ML
25-50 INJECTION INTRAMUSCULAR; INTRAVENOUS
Status: DISCONTINUED | OUTPATIENT
Start: 2018-07-16 | End: 2018-07-16 | Stop reason: HOSPADM

## 2018-07-16 RX ORDER — LIDOCAINE HYDROCHLORIDE 10 MG/ML
1-10 INJECTION, SOLUTION EPIDURAL; INFILTRATION; INTRACAUDAL; PERINEURAL
Status: COMPLETED | OUTPATIENT
Start: 2018-07-16 | End: 2018-07-16

## 2018-07-16 RX ADMIN — IOPAMIDOL 40 ML: 755 INJECTION, SOLUTION INTRAVASCULAR at 12:15

## 2018-07-16 RX ADMIN — MIDAZOLAM 0.5 MG: 1 INJECTION INTRAMUSCULAR; INTRAVENOUS at 11:44

## 2018-07-16 RX ADMIN — NITROGLYCERIN 200 MCG: 5 INJECTION, SOLUTION INTRAVENOUS at 11:48

## 2018-07-16 RX ADMIN — MIDAZOLAM 0.5 MG: 1 INJECTION INTRAMUSCULAR; INTRAVENOUS at 11:53

## 2018-07-16 RX ADMIN — MIDAZOLAM 0.5 MG: 1 INJECTION INTRAMUSCULAR; INTRAVENOUS at 11:48

## 2018-07-16 RX ADMIN — SODIUM CHLORIDE: 9 INJECTION, SOLUTION INTRAVENOUS at 10:14

## 2018-07-16 RX ADMIN — ACETAMINOPHEN 650 MG: 325 TABLET, FILM COATED ORAL at 13:51

## 2018-07-16 RX ADMIN — FENTANYL CITRATE 25 MCG: 50 INJECTION, SOLUTION INTRAMUSCULAR; INTRAVENOUS at 11:43

## 2018-07-16 RX ADMIN — FENTANYL CITRATE 25 MCG: 50 INJECTION, SOLUTION INTRAMUSCULAR; INTRAVENOUS at 11:53

## 2018-07-16 RX ADMIN — LIDOCAINE HYDROCHLORIDE 10 ML: 10 INJECTION, SOLUTION EPIDURAL; INFILTRATION; INTRACAUDAL; PERINEURAL at 11:47

## 2018-07-16 RX ADMIN — FENTANYL CITRATE 25 MCG: 50 INJECTION, SOLUTION INTRAMUSCULAR; INTRAVENOUS at 11:37

## 2018-07-16 RX ADMIN — HEPARIN SODIUM 5000 UNITS: 1000 INJECTION, SOLUTION INTRAVENOUS; SUBCUTANEOUS at 11:47

## 2018-07-16 RX ADMIN — VERAPAMIL HYDROCHLORIDE 2.5 MG: 2.5 INJECTION, SOLUTION INTRAVENOUS at 11:49

## 2018-07-16 RX ADMIN — MIDAZOLAM 0.5 MG: 1 INJECTION INTRAMUSCULAR; INTRAVENOUS at 11:37

## 2018-07-16 RX ADMIN — FENTANYL CITRATE 25 MCG: 50 INJECTION, SOLUTION INTRAMUSCULAR; INTRAVENOUS at 11:49

## 2018-07-16 NOTE — IP AVS SNAPSHOT
MRN:[id number]                      After Visit Summary   7/16/2018    Demetri Booth    MRN: [id number]           Visit Information        Department      7/16/2018  9:22 AM [facility]          Review of your medicines      UNREVIEWED medicines. Ask your doctor about these medicines        Dose / Directions    amLODIPine 5 MG tablet   Commonly known as:  NORVASC   Used for:  Benign essential hypertension        Dose:  5 mg   Take 1 tablet (5 mg) by mouth daily   Quantity:  30 tablet   Refills:  0       apixaban ANTICOAGULANT 5 MG tablet   Commonly known as:  ELIQUIS   Used for:  Atrial fibrillation with RVR (H)        Dose:  5 mg   Take 1 tablet (5 mg) by mouth 2 times daily   Quantity:  180 tablet   Refills:  3       atorvastatin 20 MG tablet   Commonly known as:  LIPITOR        Dose:  10 mg   Take 10 mg by mouth daily   Refills:  0       clobetasol 0.05 % cream   Commonly known as:  TEMOVATE   Used for:  Psoriasis of scalp        APPLY TOPICALLY TWICE DAILY AS NEEDED FOR UP TO TWO WEEKS, THEN INTERMITTENLY.   Quantity:  30 g   Refills:  3       glimepiride 4 MG tablet   Commonly known as:  AMARYL   Used for:  Type 2 diabetes mellitus without complication, without long-term current use of insulin (H)        TAKE 1 TABLET BY MOUTH EVERY MORNING BEFORE BREAKFAST   Quantity:  90 tablet   Refills:  1       hydrocortisone 1 % ointment   Used for:  Atrial fibrillation (H)        Apply sparingly to affected area.   Quantity:  30 g   Refills:  0       lisinopril 5 MG tablet   Commonly known as:  PRINIVIL/ZESTRIL   Used for:  Hypertension goal BP (blood pressure) < 130/80        Dose:  5 mg   Take 1 tablet (5 mg) by mouth 2 times daily   Quantity:  180 tablet   Refills:  3       metFORMIN 1000 MG tablet   Commonly known as:  GLUCOPHAGE   Used for:  Type 2 diabetes mellitus without complication, without long-term current use of insulin (H)        TAKE 1 TABLET BY MOUTH TWICE DAILY WITH  MEALS   Quantity:  180 tablet   Refills:  1       metoprolol tartrate 100 MG tablet   Commonly known as:  LOPRESSOR   Used for:  Hypertension goal BP (blood pressure) < 130/80        Dose:  150 mg   Take 1.5 tablets (150 mg) by mouth 2 times daily TAKE 1 1/2 TABLETS BY MOUTH TWICE DAILY   Quantity:  90 tablet   Refills:  3       multivitamin, therapeutic with minerals Tabs tablet        Dose:  1 tablet   Take 1 tablet by mouth daily.   Refills:  0       sitagliptin 100 MG tablet   Commonly known as:  JANUVIA   Used for:  DM (diabetes mellitus) type II uncontrolled, periph vascular disorder (H)        Dose:  100 mg   Take 1 tablet (100 mg) by mouth daily   Quantity:  90 tablet   Refills:  1       TAZORAC 0.05 % Crea cream   Indication:  Plaque Psoriasis   Generic drug:  tazarotene        Externally apply  topically daily as needed. Indications: Plaque Psoriasis   Refills:  0       tobramycin-dexamethasone 0.3-0.1 % ophthalmic susp   Commonly known as:  TOBRADEX        Dose:  1 drop   Place 1 drop Into the left eye 4 times daily   Refills:  0       TYLENOL PO        Dose:  500 mg   Take 500 mg by mouth every 4 hours as needed   Refills:  0         CONTINUE these medicines which have NOT CHANGED        Dose / Directions    ACCU-CHEK SMARTVIEW test strip   Generic drug:  blood glucose monitoring        by In Vitro route daily Reported on 2/15/2017   Refills:  0       blood glucose monitoring lancets        Dose:  1 each   1 each by In Vitro route daily Reported on 2/15/2017   Refills:  0       order for DME   Used for:  Sleep apnea, unspecified type        CPAP every night  Needs small travel sized CPAP machine   Quantity:  1 Device   Refills:  0       order for DME   Used for:  Cardiomyopathy, unspecified type (H)        Equipment being ordered: JOBST compression stockings knee high 20-30 mm compression   Quantity:  2 Device   Refills:  1                Protect others around you: Learn how to safely use, store and  throw away your medicines at www.disposemymeds.org.         Follow-ups after your visit        Your next 10 appointments already scheduled     Aug 06, 2018  1:10 PM CDT   [facility] EP RETURN with EVONNE Cade CNP   [facility] ([facility])    [address] [place] [id number]   [phone] OPT 2               Care Instructions        After Care Instructions     Discharge Instructions - Follow up with [facility] Heart Cardiologist       Follow -up with the [facility] Clinic of patient preference in 2-4 weeks            Discharge Instructions - Follow up with [facility] Nurse Practitioner        Follow up with [facility] Nurse Practitioner at [facility] of patient preference in 7-10 days.            Discharge Instructions - IF on Metformin (Glucophage or Glucovance) or Metformin containing medications       IF on Metformin (Glucophage or Glucovance) or Metformin containing medications , schedule a Basic Metabolic Panel at [facility] Heart or Primary Clinic in 48 - 72 hours post procedure and PRIOR TO resuming the Metformin or Metformin containing medications.  Hold Metformin (Glucophage or Glucovance) or Metformin containing medications until after the Basic Metabolic Panel on the 2nd or 3rd day following the procedure.  May resume after blood draw is complete.                  Further instructions from your care team       Cardiac Angiogram Discharge Instructions - Radial    After you go home:      Have an adult stay with you until tomorrow.    Drink extra fluids for 2 days.    You may resume your normal diet.    No smoking       For 24 hours - due to the sedation you received:    Relax and take it easy.    Do NOT make any important or legal decisions.    Do NOT drive or operate machines at home or at work.    Do NOT drink alcohol.    Care of Wrist Puncture Site:      For the first 24 hrs - check the puncture site every 1-2 hours while awake.    It  is normal to have soreness at the puncture site and mild tingling in your hand for up to 3 days.    Remove the bandaid after 24 hours. If there is minor oozing, apply another bandaid and remove it after 12 hours.    You may shower tomorrow.  Do NOT take a bath, or use a hot tub or pool for at least 3 days. Do NOT scrub the site. Do not use lotion or powder near the puncture site.           Activity:        For 2 days:     do not use your hand or arm to support your weight (such as rising from a chair)     do not bend your wrist (such as lifting a garage door).    do not lift more than 5 pounds or exercise your arm (such as tennis, golf or bowling).    Do NOT do any heavy activity such as exercise, lifting, or straining.     Bleeding:      If you start bleeding from the site in your wrist, sit down and press firmly on/above the site for 10 minutes.     Once bleeding stops, keep arm still for 2 hours.     Call [facility] as soon as you can.       Call 911 right away if you have heavy bleeding or bleeding that does not stop.      Medicines:            If you are on Metformin (Glucophage), do not restart it until you have blood tests (within 2 to 3 days after discharge).  After you have your blood drawn, you may restart the Metformin.     Take your medications, including blood thinners, unless your provider tells you not to.      If you have stopped any medicines, check with your provider about when to restart them.    Follow Up Appointments:      Follow up with [facility] Nurse Practitioner at [facility] Clinic of patient preference in 7-10 days.    Call the clinic if:      You have a large or growing hard lump around the site.    The site is red, swollen, hot or tender.    Blood or fluid is draining from the site.    You have chills or a fever greater than 101 F (38 C).    Your arm feels numb, cool or changes color.    You have hives, a rash or unusual itching.    Any questions or concerns.          [facility]  "[facility] [facility] [place]:    [phone] [facility] (7 days a week)             Additional Information About Your Visit        [employer] Information     [employer] gives you secure access to your electronic health record. If you see a primary care provider, you can also send messages to your care team and make appointments. If you have questions, please call your primary care clinic.  If you do not have a primary care provider, please call [phone] and they will assist you.        Care EveryWhere ID     This is your Care EveryWhere ID. This could be used by other organizations to access your [place] medical records  [id number]        Your Vitals Were     Blood Pressure Pulse Temperature Respirations Height Weight    121/64 90 96.3  F (35.7  C) (Oral) 16 1.765 m (5' 9.5\") 167.4 kg (369 lb)    Pulse Oximetry BMI (Body Mass Index)                96% 53.71 kg/m2           Primary Care Provider Office Phone # Fax #    Cheko Osborn [phone]      Equal Access to Services     [facility]: Hadii aad ku hadasho Soomaali, waaxda luqadaha, qaybta kaalmada adeegyada, waxay zina haybentley chaves . So [facility] [phone].    ATENCIÓN: Si habla español, tiene a donaldson disposición servicios gratuitos de asistencia lingüística. Llame al [phone].    We comply with applicable federal civil rights laws and [place] laws. We do not discriminate on the basis of race, color, national origin, age, disability, sex, sexual orientation, or gender identity.            Thank you!     Thank you for choosing [facility] [place] for your care. Our goal is always to provide you with excellent care. Hearing back from our patients is one way we can continue to improve our services. Please take a few minutes to complete the written survey that you may receive in the mail after you visit with us. Thank you!             Medication List: This is a list of all your medications and when to take them. Check marks below " indicate your daily home schedule. Keep this list as a reference.      Medications           Morning Afternoon Evening Bedtime As Needed    ACCU-CHEK SMARTVIEW test strip   by In Vitro route daily Reported on 2/15/2017   Generic drug:  blood glucose monitoring                                amLODIPine 5 MG tablet   Commonly known as:  NORVASC   Take 1 tablet (5 mg) by mouth daily                                apixaban ANTICOAGULANT 5 MG tablet   Commonly known as:  ELIQUIS   Take 1 tablet (5 mg) by mouth 2 times daily                                atorvastatin 20 MG tablet   Commonly known as:  LIPITOR   Take 10 mg by mouth daily                                blood glucose monitoring lancets   1 each by In Vitro route daily Reported on 2/15/2017                                clobetasol 0.05 % cream   Commonly known as:  TEMOVATE   APPLY TOPICALLY TWICE DAILY AS NEEDED FOR UP TO TWO WEEKS, THEN INTERMITTENLY.                                glimepiride 4 MG tablet   Commonly known as:  AMARYL   TAKE 1 TABLET BY MOUTH EVERY MORNING BEFORE BREAKFAST                                hydrocortisone 1 % ointment   Apply sparingly to affected area.                                lisinopril 5 MG tablet   Commonly known as:  PRINIVIL/ZESTRIL   Take 1 tablet (5 mg) by mouth 2 times daily                                metFORMIN 1000 MG tablet   Commonly known as:  GLUCOPHAGE   TAKE 1 TABLET BY MOUTH TWICE DAILY WITH MEALS                                metoprolol tartrate 100 MG tablet   Commonly known as:  LOPRESSOR   Take 1.5 tablets (150 mg) by mouth 2 times daily TAKE 1 1/2 TABLETS BY MOUTH TWICE DAILY                                multivitamin, therapeutic with minerals Tabs tablet   Take 1 tablet by mouth daily.                                order for DME   CPAP every night  Needs small travel sized CPAP machine                                order for DME   Equipment being ordered: JOBST compression stockings knee  high 20-30 mm compression                                sitagliptin 100 MG tablet   Commonly known as:  JANUVIA   Take 1 tablet (100 mg) by mouth daily                                TAZORAC 0.05 % Crea cream   Externally apply  topically daily as needed. Indications: Plaque Psoriasis   Generic drug:  tazarotene                                tobramycin-dexamethasone 0.3-0.1 % ophthalmic susp   Commonly known as:  TOBRADEX   Place 1 drop Into the left eye 4 times daily                                TYLENOL PO   Take 500 mg by mouth every 4 hours as needed

## 2018-07-16 NOTE — PROGRESS NOTES
Pt arrived ~0940.  VSS, afebrile.  Denies any discomfort.  Pt currently in a.fib.  This is pt baseline and no c/o.  Spouse at bedside.  Pt resting comfortable in bed with call light in place.  Will cont to monitor.

## 2018-07-16 NOTE — PROGRESS NOTES
Continued to observe (L) radial site closely over past couple hours. No further ozzing or hematoma at site. TR band removed and drsg applied. Discharged to home with wife at this time. Discharge instructions were given by Teresa MACIAS.

## 2018-07-16 NOTE — PROGRESS NOTES
Dc instructions reviewed.  Questions answered.  Pt aware they need to make a f/u appt with the NP at the heart clinic if they don't hear from them by tomorrow.  Pt also aware they need to have a BMP drawn before restarting Metformin.  Will cont to monitor.

## 2018-07-16 NOTE — PROGRESS NOTES
DC instructions reviewed.  Questions answered.  Pt having a little discomfort and tylenol was given~1400.  Attempted to take the TR band off at 1430.  Pt had a very small ooze, so wrist band applied and 3cc of air placed back in the balloon. Will cont to monitor.

## 2018-07-16 NOTE — PROGRESS NOTES
TR band was removed and patient was preparing for discharge when site began ozzing and a small hematoma developed just above site. Manual pressure applied and TR band was replaced with 6cc air. Dr. Sandra loredo paged with JT. Currently site is stable with band on and resolved hematoma.

## 2018-07-16 NOTE — IP AVS SNAPSHOT
[place] [address]    [place] [address]    Phone:  [phone]                                       After Visit Summary   7/16/2018    Demetri Booth    MRN: [id number]           After Visit Summary Signature Page     I have received my discharge instructions, and my questions have been answered. I have discussed any challenges I see with this plan with the nurse or doctor.    ..........................................................................................................................................  Patient/Patient Representative Signature      ..........................................................................................................................................  Patient Representative Print Name and Relationship to Patient    ..................................................               ................................................  Date                                            Time    ..........................................................................................................................................  Reviewed by Signature/Title    ...................................................              ..............................................  Date                                                            Time

## 2018-07-16 NOTE — DISCHARGE INSTRUCTIONS
Cardiac Angiogram Discharge Instructions - Radial    After you go home:      Have an adult stay with you until tomorrow.    Drink extra fluids for 2 days.    You may resume your normal diet.    No smoking       For 24 hours - due to the sedation you received:    Relax and take it easy.    Do NOT make any important or legal decisions.    Do NOT drive or operate machines at home or at work.    Do NOT drink alcohol.    Care of Wrist Puncture Site:      For the first 24 hrs - check the puncture site every 1-2 hours while awake.    It is normal to have soreness at the puncture site and mild tingling in your hand for up to 3 days.    Remove the bandaid after 24 hours. If there is minor oozing, apply another bandaid and remove it after 12 hours.    You may shower tomorrow.  Do NOT take a bath, or use a hot tub or pool for at least 3 days. Do NOT scrub the site. Do not use lotion or powder near the puncture site.           Activity:        For 2 days:     do not use your hand or arm to support your weight (such as rising from a chair)     do not bend your wrist (such as lifting a garage door).    do not lift more than 5 pounds or exercise your arm (such as tennis, golf or bowling).    Do NOT do any heavy activity such as exercise, lifting, or straining.     Bleeding:      If you start bleeding from the site in your wrist, sit down and press firmly on/above the site for 10 minutes.     Once bleeding stops, keep arm still for 2 hours.     Call [facility] as soon as you can.       Call 911 right away if you have heavy bleeding or bleeding that does not stop.      Medicines:            If you are on Metformin (Glucophage), do not restart it until you have blood tests (within 2 to 3 days after discharge).  After you have your blood drawn, you may restart the Metformin.     Take your medications, including blood thinners, unless your provider tells you not to.      If you have stopped any medicines, check with your provider about  when to restart them.    Follow Up Appointments:      Follow up with [facility] Nurse Practitioner at [facility] Clinic of patient preference in 7-10 days.    Call the clinic if:      You have a large or growing hard lump around the site.    The site is red, swollen, hot or tender.    Blood or fluid is draining from the site.    You have chills or a fever greater than 101 F (38 C).    Your arm feels numb, cool or changes color.    You have hives, a rash or unusual itching.    Any questions or concerns.          [facility] [place]:    [phone] [facility] (7 days a week)

## 2018-07-16 NOTE — PROGRESS NOTES
Pt back from the procedure ~1215.  VSS.  Left wrist site CDI, +CMS and pulse.  Spouse at bedside and DR. Flores came to update the family.  Pt denies any discomfort.  Will cont to monitor.

## 2018-07-17 ENCOUNTER — TELEPHONE (OUTPATIENT)
Dept: CARDIOLOGY | Facility: CLINIC | Age: 63
End: 2018-07-17

## 2018-07-17 DIAGNOSIS — I48.91 ATRIAL FIBRILLATION (H): Primary | ICD-10-CM

## 2018-07-17 NOTE — TELEPHONE ENCOUNTER
Message  Received: Yesterday       Chelsea Bird MD  [facility] Ep Nurse                     No sig CAD, a pleasant surprise.   Please arrange a f/up apt with Luz Maria in 1 year.   [place]       Writer called pt with results and informed to f/u with Luz Maria Miranda in one year as ordered. OV scheduled for August cancelled. CECILE Carpio RN.

## 2018-07-17 NOTE — TELEPHONE ENCOUNTER
Chelsea Bird MD  [facility] Ep Nurse                     No sig CAD, a pleasant surprise.   Please arrange a f/up apt with Luz Maria in 1 year.   [place], [place]         Verbalized understanding, order placed for Luz Maria. SK

## 2018-07-18 DIAGNOSIS — Z98.890 S/P CORONARY ANGIOGRAM: ICD-10-CM

## 2018-07-18 LAB — INTERPRETATION ECG - MUSE: NORMAL

## 2018-07-18 PROCEDURE — 36415 COLL VENOUS BLD VENIPUNCTURE: CPT | Performed by: INTERNAL MEDICINE

## 2018-07-18 PROCEDURE — 80048 BASIC METABOLIC PNL TOTAL CA: CPT | Performed by: INTERNAL MEDICINE

## 2018-07-19 ENCOUNTER — TELEPHONE (OUTPATIENT)
Dept: CARDIOLOGY | Facility: CLINIC | Age: 63
End: 2018-07-19

## 2018-07-19 LAB
ANION GAP SERPL CALCULATED.3IONS-SCNC: 9 MMOL/L (ref 3–14)
BUN SERPL-MCNC: 13 MG/DL (ref 7–30)
CALCIUM SERPL-MCNC: 8.7 MG/DL (ref 8.5–10.1)
CHLORIDE SERPL-SCNC: 101 MMOL/L (ref 94–109)
CO2 SERPL-SCNC: 25 MMOL/L (ref 20–32)
CREAT SERPL-MCNC: 0.85 MG/DL (ref 0.66–1.25)
GFR SERPL CREATININE-BSD FRML MDRD: >90 ML/MIN/1.7M2
GLUCOSE SERPL-MCNC: 283 MG/DL (ref 70–99)
POTASSIUM SERPL-SCNC: 4.5 MMOL/L (ref 3.4–5.3)
SODIUM SERPL-SCNC: 135 MMOL/L (ref 133–144)

## 2018-07-19 NOTE — TELEPHONE ENCOUNTER
Patient's BMP results received s/p angio. Patient's BMP WNL (outside of Glucose). RN called patient and left  advising patient he is ok to resume taking his metformin. RN advised patient in VM to call clinic with any further questions/concerns.

## 2018-08-07 DIAGNOSIS — I48.91 ATRIAL FIBRILLATION WITH RVR (H): ICD-10-CM

## 2018-08-09 ENCOUNTER — TELEPHONE (OUTPATIENT)
Dept: CARDIOLOGY | Facility: CLINIC | Age: 63
End: 2018-08-09

## 2018-08-13 ENCOUNTER — TELEPHONE (OUTPATIENT)
Dept: CARDIOLOGY | Facility: CLINIC | Age: 63
End: 2018-08-13

## 2018-08-13 NOTE — TELEPHONE ENCOUNTER
PA Initiation    Medication: apixaban ANTICOAGULANT (ELIQUIS) 5 MG tablet -   Insurance Company: [employer] - Phone [phone] Fax [phone]  [facility] [address] [place] [address] AT [facility] [address]  Filling Pharmacy Phone: [phone]  Filling Pharmacy Fax: [phone]  Start Date: 8/13/2018

## 2018-08-13 NOTE — TELEPHONE ENCOUNTER
Patient's spouse called in requesting help with Eliquis that now has a co pay of 500.00/month with LES with [employer]. He is not on Medicare and has been on Xarelto and warfarin in the past. He has been out of Eliquis for about a week. I told her that when his deductible is met this should lower his co pay substantially.In the meantime, I will leave samples ( 3 boxes) along with a 30 day free trial card to be picked up today.She will check into where their deductible is at. A current Rx from 2/23/18 is in his chart. Mara

## 2018-09-04 DIAGNOSIS — E11.9 TYPE 2 DIABETES MELLITUS WITHOUT COMPLICATION, WITHOUT LONG-TERM CURRENT USE OF INSULIN (H): ICD-10-CM

## 2018-09-04 NOTE — TELEPHONE ENCOUNTER
"Requested Prescriptions   Pending Prescriptions Disp Refills     metFORMIN (GLUCOPHAGE) 1000 MG tablet  Last Written Prescription Date:  12/6/2017  Last Fill Quantity: 180 tablet,  # refills: 1   Last Office Visit  6/19/2018        with  G, P or [facility] prescribing provider:     Future Office Visit:        180 tablet 1     Sig: TAKE 1 TABLET BY MOUTH TWICE DAILY WITH MEALS    Biguanide Agents Failed    9/4/2018 11:50 AM       Failed - Blood pressure less than 140/90 in past 6 months    BP Readings from Last 3 Encounters:   07/16/18 (!) 135/93   06/26/18 136/88   06/19/18 124/78            Passed - Patient has documented LDL within the past 12 mos.    Recent Labs   Lab Test  12/06/17   1644   LDL  86            Passed - Patient has had a Microalbumin in the past 15 mos.    Recent Labs   Lab Test  06/19/18   1105   02/04/11   1539   [id number]   --    --   5.0   [id number]   --    --   9.9   MICROL  10   < >   --    UMALCR  22.39*   < >   --     < > = values in this interval not displayed.            Passed - Patient is age 10 or older       Passed - Patient has documented A1c within the specified period of time.    If HgbA1C is 8 or greater, it needs to be on file within the past 3 months.  If less than 8, must be on file within the past 6 months.     Recent Labs   Lab Test  06/19/18   1103   A1C  7.3*            Passed - Patient's CR is NOT>1.4 OR Patient's EGFR is NOT<45 within past 12 mos.    Recent Labs   Lab Test  07/18/18   1042   GFRESTIMATED  >90   GFRESTBLACK  >90       Recent Labs   Lab Test  07/18/18   1042   CR  0.85            Passed - Patient does NOT have a diagnosis of CHF.       Passed - Recent (6 mo) or future (30 days) visit within the authorizing provider's specialty    Patient had office visit in the last 6 months or has a visit in the next 30 days with authorizing provider or within the authorizing provider's specialty.  See \"Patient Info\" tab in inbasket, or \"Choose Columns\" in Meds & Orders " section of the refill encounter.

## 2018-11-08 DIAGNOSIS — I10 BENIGN ESSENTIAL HYPERTENSION: ICD-10-CM

## 2018-11-08 RX ORDER — AMLODIPINE BESYLATE 5 MG/1
5 TABLET ORAL DAILY
Qty: 90 TABLET | Refills: 1 | Status: SHIPPED | OUTPATIENT
Start: 2018-11-08 | End: 2019-04-03

## 2018-12-05 ENCOUNTER — TELEPHONE (OUTPATIENT)
Dept: CARDIOLOGY | Facility: CLINIC | Age: 63
End: 2018-12-05

## 2018-12-06 ENCOUNTER — DOCUMENTATION ONLY (OUTPATIENT)
Dept: CARDIOLOGY | Facility: CLINIC | Age: 63
End: 2018-12-06

## 2018-12-06 NOTE — PROGRESS NOTES
PA/tier exception has been requested. Pt has been on Eliquis since 1/2016 and also has been on Xarelto and Warfarin.  JNelsonRAMERICO

## 2018-12-07 DIAGNOSIS — I48.91 ATRIAL FIBRILLATION WITH RVR (H): ICD-10-CM

## 2018-12-10 ENCOUNTER — TELEPHONE (OUTPATIENT)
Dept: CARDIOLOGY | Facility: CLINIC | Age: 63
End: 2018-12-10

## 2018-12-10 NOTE — TELEPHONE ENCOUNTER
Patient is in the donut hole/GAP. A tier exception will not help/apply when in donut hole.    Central Prior Authorization Team   Phone: [phone]      PA Initiation    Medication: apixaban ANTICOAGULANT (ELIQUIS) 5 MG tablet - PA ALREADY ON FILE  Insurance Company: [employer] - Phone [phone] Fax [phone]  Pharmacy Filling the Rx: [facility] [address] [place] [address] AT [address]  Filling Pharmacy Phone:    Filling Pharmacy Fax:    Start Date: 12/10/2018

## 2018-12-11 NOTE — TELEPHONE ENCOUNTER
Pt is not in the donut hole, but is actually has changed his insurance, thus a new PA is needing to be completed.  Explained to pt and wife that a PA can not be completed until there is the new Insurance information in epic.  Pt has been without Eliquis per pt wife.  I have asked that pt or wife bring in pt new insurance information and  some samples of Eliquis.  Pt did not  last month of Eliquis, d/t cost of $500. Once new Insurance information is in Epic PA can be run.  Pt wife states understanding. Jayant

## 2018-12-31 NOTE — TELEPHONE ENCOUNTER
I called plan- [employer] Admin Review Dept  @ [phone]  and rep states patient has a pa on file (Coverage Start Date:07/14/2018;Coverage End Date:08/14/2019); that transferred from ID# [id number] that termed 10/23/2018 to ID# [id number] that terms today 12/31/2018; that will transfer to new ID# [id number] on 1/1/2019. Rep states that patient has a 40% coinsurance that he has to pay for brand name medication or $24 for generics thus is why his cost is so high. His plan does not have a tier review/exception- so unable to request one.

## 2018-12-31 NOTE — TELEPHONE ENCOUNTER
Call to pt to explain that the prior Auth team called and pt must pt 40% coinsurance for name brand medications and that there is no tier review/excetion that can be done. Pt will have medicare on 2/17/19 with pt turns 64, will then repeat process if needed.  For now will have pt  samples as the cost for pt Tao is $500/mo. Jayant

## 2019-01-25 ENCOUNTER — TELEPHONE (OUTPATIENT)
Dept: OTHER | Facility: CLINIC | Age: 64
End: 2019-01-25

## 2019-02-06 DIAGNOSIS — E78.00 HYPERCHOLESTEROLEMIA: Primary | ICD-10-CM

## 2019-02-06 DIAGNOSIS — E11.9 TYPE 2 DIABETES MELLITUS WITHOUT COMPLICATION, WITHOUT LONG-TERM CURRENT USE OF INSULIN (H): ICD-10-CM

## 2019-02-06 NOTE — TELEPHONE ENCOUNTER
"Requested Prescriptions   Pending Prescriptions Disp Refills     glimepiride (AMARYL) 4 MG tablet  Last Written Prescription Date:  12/6/2017  Last Fill Quantity: 90 tablet,  # refills: 1   Last office visit: 6/19/2018 with prescribing provider:  Anurag   Future Office Visit:     90 tablet 1     Sig: TAKE 1 TABLET BY MOUTH EVERY MORNING BEFORE BREAKFAST    Sulfonylurea Agents Failed - 2/6/2019  1:20 PM       Failed - Blood pressure less than 140/90 in past 6 months    BP Readings from Last 3 Encounters:   07/16/18 (!) 135/93   06/26/18 136/88   06/19/18 124/78                Failed - Patient has documented LDL within the past 12 mos.    Recent Labs   Lab Test 12/06/17  1644   LDL 86            Failed - Patient has documented A1c within the specified period of time.    If HgbA1C is 8 or greater, it needs to be on file within the past 3 months.  If less than 8, must be on file within the past 6 months.     Recent Labs   Lab Test 06/19/18  1103   A1C 7.3*            Failed - Recent (6 mo) or future (30 days) visit within the authorizing provider's specialty    Patient had office visit in the last 6 months or has a visit in the next 30 days with authorizing provider or within the authorizing provider's specialty.  See \"Patient Info\" tab in inbasket, or \"Choose Columns\" in Meds & Orders section of the refill encounter.           Passed - Patient has had a Microalbumin in the past 12 mos.    Recent Labs   Lab Test 06/19/18  1105  02/04/11  1539   [id number]  --   --  5.0   [id number]  --   --  9.9   MICROL 10   < >  --    UMALCR 22.39*   < >  --     < > = values in this interval not displayed.            Passed - Medication is active on med list       Passed - Patient is age 18 or older       Passed - Patient has a recent creatinine (normal) within the past 12 mos.    Recent Labs   Lab Test 07/18/18  1042   CR 0.85             metFORMIN (GLUCOPHAGE) 1000 MG tablet  Last Written Prescription Date:  9/6/2018  Last Fill Quantity: 180 " "tablet,  # refills: 0   Last office visit: 6/19/2018 with prescribing provider:  Anurag   Future Office Visit:     180 tablet 0     Sig: TAKE 1 TABLET BY MOUTH TWICE DAILY WITH MEALS    Biguanide Agents Failed - 2/6/2019  1:20 PM       Failed - Blood pressure less than 140/90 in past 6 months    BP Readings from Last 3 Encounters:   07/16/18 (!) 135/93   06/26/18 136/88   06/19/18 124/78                Failed - Patient has documented LDL within the past 12 mos.    Recent Labs   Lab Test 12/06/17  1644   LDL 86            Failed - Patient has documented A1c within the specified period of time.    If HgbA1C is 8 or greater, it needs to be on file within the past 3 months.  If less than 8, must be on file within the past 6 months.     Recent Labs   Lab Test 06/19/18  1103   A1C 7.3*            Failed - Recent (6 mo) or future (30 days) visit within the authorizing provider's specialty    Patient had office visit in the last 6 months or has a visit in the next 30 days with authorizing provider or within the authorizing provider's specialty.  See \"Patient Info\" tab in inbasket, or \"Choose Columns\" in Meds & Orders section of the refill encounter.           Passed - Patient has had a Microalbumin in the past 15 mos.    Recent Labs   Lab Test 06/19/18  1105  02/04/11  1539   [id number]  --   --  5.0   [id number]  --   --  9.9   MICROL 10   < >  --    UMALCR 22.39*   < >  --     < > = values in this interval not displayed.            Passed - Patient is age 10 or older       Passed - Patient's CR is NOT>1.4 OR Patient's EGFR is NOT<45 within past 12 mos.    Recent Labs   Lab Test 07/18/18  1042   GFRESTIMATED >90   GFRESTBLACK >90       Recent Labs   Lab Test 07/18/18  1042   CR 0.85            Passed - Patient does NOT have a diagnosis of CHF.       Passed - Medication is active on med list        atorvastatin (LIPITOR) 20 MG tablet  Last Written Prescription Date:  na  Last Fill Quantity: na,  # refills: na   Last office visit: " "6/19/2018 with prescribing provider:  Anurag   Future Office Visit:           Sig: Take 0.5 tablets (10 mg) by mouth daily    Statins Protocol Failed - 2/6/2019  1:20 PM       Failed - LDL on file in past 12 months    Recent Labs   Lab Test 12/06/17  1644   LDL 86            Passed - No abnormal creatine kinase in past 12 months    No lab results found.            Passed - Recent (12 mo) or future (30 days) visit within the authorizing provider's specialty    Patient had office visit in the last 12 months or has a visit in the next 30 days with authorizing provider or within the authorizing provider's specialty.  See \"Patient Info\" tab in inbasket, or \"Choose Columns\" in Meds & Orders section of the refill encounter.             Passed - Medication is active on med list       Passed - Patient is age 18 or older           "

## 2019-02-08 RX ORDER — ATORVASTATIN CALCIUM 20 MG/1
10 TABLET, FILM COATED ORAL DAILY
Qty: 45 TABLET | Refills: 0 | Status: SHIPPED | OUTPATIENT
Start: 2019-02-08 | End: 2019-04-03

## 2019-02-08 RX ORDER — GLIMEPIRIDE 4 MG/1
TABLET ORAL
Qty: 90 TABLET | Refills: 0 | Status: SHIPPED | OUTPATIENT
Start: 2019-02-08 | End: 2019-04-03

## 2019-02-08 NOTE — TELEPHONE ENCOUNTER
Routing refill request to provider for review/approval because:  Labs not current:  LDL, A1c  Patient needs to be seen because it has been more than 6 months since last OV.

## 2019-02-15 DIAGNOSIS — L40.9 PSORIASIS OF SCALP: ICD-10-CM

## 2019-02-15 NOTE — TELEPHONE ENCOUNTER
clobetasol (TEMOVATE) 0.05 % external cream  Last Written Prescription Date:  12/26/17  Last Fill Quantity: 30g,  # refills: 3   Last office visit: 6/19/2018 with prescribing provider:  06/19/18   Future Office Visit:    Requested Prescriptions   Pending Prescriptions Disp Refills     clobetasol (TEMOVATE) 0.05 % external cream 30 g 3     Sig: APPLY TOPICALLY TWICE DAILY AS NEEDED FOR UP TO TWO WEEKS, THEN INTERMITTENLY.    There is no refill protocol information for this order

## 2019-02-19 RX ORDER — CLOBETASOL PROPIONATE 0.5 MG/G
CREAM TOPICAL
Qty: 30 G | Refills: 3 | Status: SHIPPED | OUTPATIENT
Start: 2019-02-19 | End: 2020-01-20

## 2019-03-25 DIAGNOSIS — I10 HYPERTENSION GOAL BP (BLOOD PRESSURE) < 130/80: ICD-10-CM

## 2019-03-25 DIAGNOSIS — E11.9 TYPE 2 DIABETES MELLITUS WITHOUT COMPLICATION, WITHOUT LONG-TERM CURRENT USE OF INSULIN (H): ICD-10-CM

## 2019-03-25 RX ORDER — LISINOPRIL 5 MG/1
5 TABLET ORAL 2 TIMES DAILY
Qty: 180 TABLET | Refills: 0 | Status: SHIPPED | OUTPATIENT
Start: 2019-03-25 | End: 2019-06-19

## 2019-03-25 NOTE — TELEPHONE ENCOUNTER
Reason for Call:  Medication or medication refill:    Do you use a [facility]?  Name of the pharmacy and phone number for the current request:  [facility]    Name of the medication requested: Lisinopril     Other request: none    Can we leave a detailed message on this number? YES    Phone number patient can be reached at: Home number on file [phone] (home)    Best Time: any    Call taken on 3/25/2019 at 11:16 AM by GABBY WORRELL

## 2019-03-25 NOTE — TELEPHONE ENCOUNTER
"Medication is being filled for 1 time refill only due to:  :  Patient has appt in April.       Requested Prescriptions   Pending Prescriptions Disp Refills     lisinopril (PRINIVIL/ZESTRIL) 5 MG tablet 180 tablet 0     Sig: Take 1 tablet (5 mg) by mouth 2 times daily    ACE Inhibitors (Including Combos) Protocol Failed - 3/25/2019  1:17 PM       Failed - Blood pressure under 140/90 in past 12 months    BP Readings from Last 3 Encounters:   07/16/18 (!) 135/93   06/26/18 136/88   06/19/18 124/78                Passed - Recent (12 mo) or future (30 days) visit within the authorizing provider's specialty    Patient had office visit in the last 12 months or has a visit in the next 30 days with authorizing provider or within the authorizing provider's specialty.  See \"Patient Info\" tab in inbasket, or \"Choose Columns\" in Meds & Orders section of the refill encounter.             Passed - Medication is active on med list       Passed - Patient is age 18 or older       Passed - Normal serum creatinine on file in past 12 months    Recent Labs   Lab Test 07/18/18  1042   CR 0.85            Passed - Normal serum potassium on file in past 12 months    Recent Labs   Lab Test 07/18/18  1042   POTASSIUM 4.5               "

## 2019-04-03 ENCOUNTER — OFFICE VISIT (OUTPATIENT)
Dept: FAMILY MEDICINE | Facility: CLINIC | Age: 64
End: 2019-04-03
Payer: COMMERCIAL

## 2019-04-03 VITALS
WEIGHT: 315 LBS | TEMPERATURE: 97.6 F | DIASTOLIC BLOOD PRESSURE: 72 MMHG | RESPIRATION RATE: 20 BRPM | SYSTOLIC BLOOD PRESSURE: 118 MMHG | HEIGHT: 69 IN | BODY MASS INDEX: 46.65 KG/M2 | HEART RATE: 88 BPM | OXYGEN SATURATION: 97 %

## 2019-04-03 DIAGNOSIS — I48.91 ATRIAL FIBRILLATION WITH RVR (H): ICD-10-CM

## 2019-04-03 DIAGNOSIS — I42.9 CARDIOMYOPATHY, UNSPECIFIED TYPE (H): ICD-10-CM

## 2019-04-03 DIAGNOSIS — Z00.00 MEDICARE ANNUAL WELLNESS VISIT, INITIAL: Primary | ICD-10-CM

## 2019-04-03 DIAGNOSIS — E66.01 MORBID OBESITY (H): ICD-10-CM

## 2019-04-03 DIAGNOSIS — I10 BENIGN ESSENTIAL HYPERTENSION: ICD-10-CM

## 2019-04-03 DIAGNOSIS — Z12.5 SCREENING FOR PROSTATE CANCER: ICD-10-CM

## 2019-04-03 DIAGNOSIS — E11.9 TYPE 2 DIABETES MELLITUS WITHOUT COMPLICATION, WITHOUT LONG-TERM CURRENT USE OF INSULIN (H): ICD-10-CM

## 2019-04-03 DIAGNOSIS — Z23 NEED FOR PROPHYLACTIC VACCINATION AGAINST STREPTOCOCCUS PNEUMONIAE (PNEUMOCOCCUS): ICD-10-CM

## 2019-04-03 DIAGNOSIS — E78.00 HYPERCHOLESTEROLEMIA: ICD-10-CM

## 2019-04-03 DIAGNOSIS — J44.9 CHRONIC OBSTRUCTIVE PULMONARY DISEASE, UNSPECIFIED COPD TYPE (H): ICD-10-CM

## 2019-04-03 DIAGNOSIS — Z23 NEED FOR VACCINATION: ICD-10-CM

## 2019-04-03 LAB
ALBUMIN UR-MCNC: NEGATIVE MG/DL
APPEARANCE UR: CLEAR
BILIRUB UR QL STRIP: NEGATIVE
COLOR UR AUTO: YELLOW
ERYTHROCYTE [DISTWIDTH] IN BLOOD BY AUTOMATED COUNT: 12.6 % (ref 10–15)
GLUCOSE UR STRIP-MCNC: NEGATIVE MG/DL
HBA1C MFR BLD: 7.8 % (ref 0–5.6)
HCT VFR BLD AUTO: 44 % (ref 40–53)
HGB BLD-MCNC: 15.3 G/DL (ref 13.3–17.7)
HGB UR QL STRIP: NEGATIVE
KETONES UR STRIP-MCNC: NEGATIVE MG/DL
LEUKOCYTE ESTERASE UR QL STRIP: NEGATIVE
MCH RBC QN AUTO: 34.6 PG (ref 26.5–33)
MCHC RBC AUTO-ENTMCNC: 34.8 G/DL (ref 31.5–36.5)
MCV RBC AUTO: 100 FL (ref 78–100)
NITRATE UR QL: NEGATIVE
PH UR STRIP: 6 PH (ref 5–7)
PLATELET # BLD AUTO: 115 10E9/L (ref 150–450)
RBC # BLD AUTO: 4.42 10E12/L (ref 4.4–5.9)
SOURCE: NORMAL
SP GR UR STRIP: 1.01 (ref 1–1.03)
UROBILINOGEN UR STRIP-ACNC: >=8 EU/DL (ref 0.2–1)
WBC # BLD AUTO: 3.7 10E9/L (ref 4–11)

## 2019-04-03 PROCEDURE — 83036 HEMOGLOBIN GLYCOSYLATED A1C: CPT | Performed by: FAMILY MEDICINE

## 2019-04-03 PROCEDURE — 99207 ZZC FOOT EXAM  NO CHARGE: CPT | Mod: 25 | Performed by: FAMILY MEDICINE

## 2019-04-03 PROCEDURE — G0009 ADMIN PNEUMOCOCCAL VACCINE: HCPCS | Performed by: FAMILY MEDICINE

## 2019-04-03 PROCEDURE — 90670 PCV13 VACCINE IM: CPT | Performed by: FAMILY MEDICINE

## 2019-04-03 PROCEDURE — 84443 ASSAY THYROID STIM HORMONE: CPT | Performed by: FAMILY MEDICINE

## 2019-04-03 PROCEDURE — 81003 URINALYSIS AUTO W/O SCOPE: CPT | Performed by: FAMILY MEDICINE

## 2019-04-03 PROCEDURE — 80048 BASIC METABOLIC PNL TOTAL CA: CPT | Performed by: FAMILY MEDICINE

## 2019-04-03 PROCEDURE — 80061 LIPID PANEL: CPT | Performed by: FAMILY MEDICINE

## 2019-04-03 PROCEDURE — G0103 PSA SCREENING: HCPCS | Performed by: FAMILY MEDICINE

## 2019-04-03 PROCEDURE — 82043 UR ALBUMIN QUANTITATIVE: CPT | Performed by: FAMILY MEDICINE

## 2019-04-03 PROCEDURE — 84460 ALANINE AMINO (ALT) (SGPT): CPT | Performed by: FAMILY MEDICINE

## 2019-04-03 PROCEDURE — 84450 TRANSFERASE (AST) (SGOT): CPT | Performed by: FAMILY MEDICINE

## 2019-04-03 PROCEDURE — G0402 INITIAL PREVENTIVE EXAM: HCPCS | Performed by: FAMILY MEDICINE

## 2019-04-03 PROCEDURE — 85027 COMPLETE CBC AUTOMATED: CPT | Performed by: FAMILY MEDICINE

## 2019-04-03 PROCEDURE — 36415 COLL VENOUS BLD VENIPUNCTURE: CPT | Performed by: FAMILY MEDICINE

## 2019-04-03 RX ORDER — ATORVASTATIN CALCIUM 20 MG/1
10 TABLET, FILM COATED ORAL DAILY
Qty: 45 TABLET | Refills: 0 | Status: SHIPPED | OUTPATIENT
Start: 2019-04-03 | End: 2019-08-02

## 2019-04-03 RX ORDER — AMLODIPINE BESYLATE 5 MG/1
5 TABLET ORAL DAILY
Qty: 90 TABLET | Refills: 1 | Status: SHIPPED | OUTPATIENT
Start: 2019-04-03 | End: 2021-10-20

## 2019-04-03 RX ORDER — GLIMEPIRIDE 4 MG/1
TABLET ORAL
Qty: 90 TABLET | Refills: 0 | Status: SHIPPED | OUTPATIENT
Start: 2019-04-03 | End: 2019-07-13

## 2019-04-03 ASSESSMENT — MIFFLIN-ST. JEOR: SCORE: 2438.95

## 2019-04-03 NOTE — NURSING NOTE
Screening Questionnaire for Adult Immunization    Are you sick today?   No   Do you have allergies to medications, food, a vaccine component or latex?   No   Have you ever had a serious reaction after receiving a vaccination?   No   Do you have a long-term health problem with heart disease, lung disease, asthma, kidney disease, metabolic disease (e.g. diabetes), anemia, or other blood disorder?   Yes   Do you have cancer, leukemia, HIV/AIDS, or any other immune system problem?   No   In the past 3 months, have you taken medications that affect  your immune system, such as prednisone, other steroids, or anticancer drugs; drugs for the treatment of rheumatoid arthritis, Crohn s disease, or psoriasis; or have you had radiation treatments?   No   Have you had a seizure, or a brain or other nervous system problem?   No   During the past year, have you received a transfusion of blood or blood     products, or been given immune (gamma) globulin or antiviral drug?   No   For women: Are you pregnant or is there a chance you could become        pregnant during the next month?   No   Have you received any vaccinations in the past 4 weeks?   No     Immunization questionnaire was positive for at least one answer.  Notified provider.        Per orders of Dr. Osborn, injection of Prevnar 13 given by Princess LINDSEY Betts. Patient instructed to remain in clinic for 15 minutes afterwards, and to report any adverse reaction to me immediately.       Screening performed by Princess LINDSEY Betts on 4/3/2019 at 9:08 AM.

## 2019-04-03 NOTE — PROGRESS NOTES
SUBJECTIVE:   Demetri Booth is a 64 year old male who presents for Preventive Visit.    Are you in the first 12 months of your Medicare Part B coverage?  Yes,  Visual Acuity:  Right Eye: 10/16 (20/32)   Left Eye: 10/16 (20/32)  Both Eyes: 10/12.5 (20/25)    Physical Health:    In general, how would you rate your overall physical health? fair    Outside of work, how many days during the week do you exercise? none    Outside of work, approximately how many minutes a day do you exercise?not applicable  If you drink alcohol do you typically have >3 drinks per day or >7 drinks per week? Yes - AUDIT SCORE:    Standardized Alcohol Screening Questionnaire  AUDIT   Questions 0 1 2 3 4 Score   1. How often do you have a drink  containing alcohol? Never Monthly or less 2 to 4  times a  month 2 to 3  times a  week 4 or more  times a  week 4   2. How many drinks containing alcohol  do you have on a typical day when you are drinking? 1 or 2 3 or 4 5 or 6 7 to 9 10 or more 1   3. How often do you have more than five  or more drinks on one occasion? Never Less  than  monthly Monthly Weekly Daily or  almost  daily 1   4. How often during the last year have  you found that you were not able to stop drinking once you had started? Never Less  than  monthly Monthly Weekly Daily or  almost  daily 1   5. How often during the last year have  you failed to do what was normally expected of you because of drinking? Never Less  than  monthly Monthly Weekly Daily or  almost  daily 0   6. How often during the last year have  you needed a first drink in the morning to get yourself going after a heavy drinking session? Never Less  than  monthly Monthly Weekly Daily or  almost  daily 1   7. How often during the last year have you had a feeling of guilt or remorse after drinking? Never Less  than  monthly Monthly Weekly Daily or  almost  daily 1   8. How often during the last year have  you been unable to remember what happened the night before  "because of your drinking? Never Less  than  monthly Monthly Weekly Daily or  almost  daily 0   9. Have you or someone else been  injured because of your drinking? No  Yes, but not in the last year  Yes,  during the  last year 0   10. Has a relative, friend, doctor or other health care worker been concerned about your drinking or suggested you cut down? No  Yes, but not in the last year  Yes,  during the  last year 4   Total 13   Scoring: A score of 7 for adult men is an indication of hazardous drinking (risk for physical or physiological harm); a score of 8 or more is an indication of an alcohol use disorder. A score of 5 or more for adult women  is an indication of hazardous drinking or an alcohol use disorder.       Do you usually eat at least 4 servings of fruit and vegetables a day, include whole grains & fiber and avoid regularly eating high fat or \"junk\" foods? NO    Do you have any problems taking medications regularly?  No    Do you have any side effects from medications? light headedness and fatigue    Needs assistance for the following daily activities: putting on socks and shoes    Which of the following safety concerns are present in your home?  none identified     Hearing impairment: No    In the past 6 months, have you been bothered by leaking of urine? no    Mental Health:    In general, how would you rate your overall mental or emotional health? fair  PHQ-2 Score:      Do you feel safe in your environment? Yes    Do you have a Health Care Directive? Yes: Patient states has Advance Directive and will bring in a copy to clinic.    Additional concerns to address?  No. Urinates for long period of time.    Fall risk:  Fallen 2 or more times in the past year?: No  Any fall with injury in the past year?: No    Cognitive Screenin) Repeat 3 items (Leader, Season, Table)    2) Clock draw:   NORMAL  3) 3 item recall:   Recalls 3 objects  Results: 3 items recalled: COGNITIVE IMPAIRMENT LESS " LIKELY    Mini-CogTM Copyright JUAN Oliva. Licensed by the author for use in [facility]; reprinted with permission ([email or web address]). All rights reserved.      Do you have sleep apnea, excessive snoring or daytime drowsiness?: yes sleep apnea    Diabetes Follow-up      Patient is checking blood sugars: not at all    Diabetic concerns: None     Symptoms of hypoglycemia (low blood sugar): none     Paresthesias (numbness or burning in feet) or sores: Yes burning     Date of last diabetic eye exam:     Diabetes Management Resources    Hyperlipidemia Follow-Up      Rate your low fat/cholesterol diet?: fair    Taking statin?  Yes, no muscle aches from statin    Other lipid medications/supplements?:  none    Hypertension Follow-up      Outpatient blood pressures are being checked at home.  Results are 120s/80s.    Low Salt Diet: not monitoring salt    Pt has cardiology appt at end of April     BP Readings from Last 2 Encounters:   19 118/72   18 (!) 135/93     Hemoglobin A1C (%)   Date Value   2018 7.3 (H)   2017 6.8 (H)     LDL Cholesterol Calculated (mg/dL)   Date Value   2017 86   2017 82            Reviewed and updated as needed this visit by clinical staff  Tobacco  Allergies  Meds  Med Hx  Surg Hx  Fam Hx  Soc Hx        Reviewed and updated as needed this visit by Provider        Social History     Tobacco Use     Smoking status: Former Smoker     Last attempt to quit: 1970     Years since quittin.5     Smokeless tobacco: Never Used   Substance Use Topics     Alcohol use: Yes     Alcohol/week: 0.0 oz     Comment: 4 drinks day, mixed drinks                           Current providers sharing in care for this patient include:   Patient Care Team:  Cheko Osborn MD as PCP - General (Family Practice)  Yareli Johnson MD as Referring Physician (Family Practice)  Carolyn Burdick MD as MD (Urology)  Cheko Osborn MD as Assigned PCP    The  following health maintenance items are reviewed in Epic and correct as of today:  Health Maintenance   Topic Date Due     SPIROMETRY ONETIME  1955     COPD ACTION PLAN Q1 YR  1955     ZOSTER IMMUNIZATION (1 of 2) 02/17/2004     EYE EXAM Q1 YEAR  02/05/2015     ALT Q1 YR  12/06/2018     LIPID MONITORING Q1 YEAR  12/06/2018     A1C Q6 MO  12/19/2018     BMP Q6 MOS  01/18/2019     MEDICARE ANNUAL WELLNESS VISIT  02/17/2019     FOOT EXAM Q1 YEAR  06/19/2019     MICROALBUMIN Q1 YEAR  06/19/2019     PHQ-2 Q1 YR  06/19/2019     CBC Q1 YR  07/16/2019     CREATININE Q1 YEAR  07/18/2019     TSH W/ FREE T4 REFLEX Q2 YEAR  12/06/2019     FALL RISK ASSESSMENT  04/03/2020     PNEUMOCOCCAL IMMUNIZATION 65+ LOW/MEDIUM RISK (2 of 2 - PPSV23) 04/03/2020     ADVANCE DIRECTIVE PLANNING Q5 YRS  10/09/2020     HF ACTION PLAN Q3 YR  06/19/2021     DTAP/TDAP/TD IMMUNIZATION (2 - Td) 02/05/2024     COLON CANCER SCREEN (SYSTEM ASSIGNED)  03/31/2024     HIV SCREEN (SYSTEM ASSIGNED)  Completed     INFLUENZA VACCINE  Completed     AORTIC ANEURYSM SCREENING (SYSTEM ASSIGNED)  Completed     HEPATITIS C SCREENING  Completed     IPV IMMUNIZATION  Aged Out     MENINGITIS IMMUNIZATION  Aged Out     Labs [facility] in [facility]  Recent Labs   Lab Test 07/18/18  1042 07/16/18  1000 06/19/18  1103  12/06/17  1644 04/12/17  0740 06/23/16  0747   A1C  --   --  7.3*  --  6.8* 7.0* 8.3*   LDL  --   --   --   --  86 82 91   HDL  --   --   --   --  52 52 44   TRIG  --   --   --   --  108 85 119   ALT  --   --   --   --  36 35 45   CR 0.85 0.84 0.82   < > 0.95 0.91 1.10   GFRESTIMATED >90 >90 >90   < > 79 84 68   GFRESTBLACK >90 >90 >90   < > >90 >90   GFR Calc   82   POTASSIUM 4.5 4.1 4.6   < > 4.7 4.3 4.5   TSH  --   --   --   --  2.19  --  1.33    < > = values in this interval not displayed.      Pneumonia Vaccine:Adults age 65+ who received their first dose of Pneumovax (PPSV23) prior to age 65 years: Should be given PCV 13 > 1 year  "after their most recent PPSV23 AND should be given a another dose of PPSV23 > 5 years after their most recent dose of PPSV23    ROS:  CONSTITUTIONAL: NEGATIVE for fever, chills, change in weight  INTEGUMENTARY/SKIN: NEGATIVE for worrisome rashes, moles or lesions  EYES: NEGATIVE for vision changes or irritation  ENT/MOUTH: NEGATIVE for ear, mouth and throat problems  RESP: NEGATIVE for significant cough or SOB  BREAST: NEGATIVE for masses, tenderness or discharge  CV: POSITIVE for irregular heart beat and lower extremity edema  GI: NEGATIVE for nausea, abdominal pain, heartburn, or change in bowel habits  : NEGATIVE for frequency, dysuria, or hematuria  MUSCULOSKELETAL: NEGATIVE for significant arthralgias or myalgia  NEURO: POSITIVE for numbness or tingling both legs  ENDOCRINE: POSITIVE  for HX diabetes  HEME: NEGATIVE for bleeding problems  PSYCHIATRIC: NEGATIVE for changes in mood or affect    OBJECTIVE:   /72 (BP Location: Right arm, Patient Position: Sitting, Cuff Size: Adult Large)   Pulse 88   Temp 97.6  F (36.4  C) (Tympanic)   Resp 20   Ht 1.74 m (5' 8.5\")   Wt (!) 167.2 kg (368 lb 8 oz)   SpO2 97%   BMI 55.22 kg/m   Estimated body mass index is 55.22 kg/m  as calculated from the following:    Height as of this encounter: 1.74 m (5' 8.5\").    Weight as of this encounter: 167.2 kg (368 lb 8 oz).  EXAM:   GENERAL: healthy, alert and no distress  EYES: Eyes grossly normal to inspection, PERRL and conjunctivae and sclerae normal  HENT: ear canals and TM's normal, nose and mouth without ulcers or lesions  NECK: no adenopathy, no asymmetry, masses, or scars and thyroid normal to palpation  RESP: lungs clear to auscultation - no rales, rhonchi or wheezes  CV: regular rate and rhythm, normal S1 S2, no S3 or S4, no murmur, click or rub, no peripheral edema and peripheral pulses strong  ABDOMEN: soft, nontender, no hepatosplenomegaly, no masses and bowel sounds normal   (male): testicles normal " without atrophy or masses, no hernias and penis normal without urethral discharge  RECTAL: normal sphincter tone, no rectal masses and prostate of normal size for age, smooth, nontender without masses/nodules  MS: no gross musculoskeletal defects noted, no edema  SKIN: no suspicious lesions or rashes  NEURO: Normal strength and tone, mentation intact and speech normal  PSYCH: mentation appears normal, affect normal/bright  Diabetic foot exam: normal DP and PT pulses, no trophic changes or ulcerative lesions and reduced sensation at both feet    Diagnostic Test Results:  Lab: see below, results pending    ASSESSMENT / PLAN:   Demetri was seen today for medicare visit and recheck medication.    Diagnoses and all orders for this visit:    Medicare annual wellness visit, initial  -     ALT  -     *UA reflex to Microscopic  -     CBC with platelets    Need for prophylactic vaccination against Streptococcus pneumoniae (pneumococcus)    Benign essential hypertension  -     amLODIPine (NORVASC) 5 MG tablet; Take 1 tablet (5 mg) by mouth daily    Hypercholesterolemia  -     Lipid panel reflex to direct LDL Fasting  -     atorvastatin (LIPITOR) 20 MG tablet; Take 0.5 tablets (10 mg) by mouth daily  -     AST    Type 2 diabetes mellitus without complication, without long-term current use of insulin (H)  -     BASIC METABOLIC PANEL  -     HEMOGLOBIN A1C  -     glimepiride (AMARYL) 4 MG tablet; TAKE 1 TABLET BY MOUTH EVERY MORNING BEFORE BREAKFAST  -     metFORMIN (GLUCOPHAGE) 1000 MG tablet; TAKE 1 TABLET BY MOUTH TWICE DAILY WITH MEALS  -     TSH with free T4 reflex  -     Albumin Random Urine Quantitative with Creat Ratio    Overweight BMI 50-55  -     BARIATRIC ADULT REFERRAL    Need for vaccination  -     Pneumococcal vaccine 13 valent PCV13 IM (Prevnar) [[id number]]  -     ADMIN PNEUMOCOCCAL VACCINE (For MEDICARE Patients ONLY) []    Screening for prostate cancer  -     Prostate spec antigen screen    Atrial fibrillation  "with RVR (H)    Cardiomyopathy, unspecified type (H)    Chronic obstructive pulmonary disease, unspecified COPD type (H)    DM (diabetes mellitus) type II uncontrolled, periph vascular disorder (H)        End of Life Planning:  Patient currently has an advanced directive: No.  I have verified the patient's ablity to prepare an advanced directive/make health care decisions.  Literature was provided to assist patient in preparing an advanced directive.    COUNSELING:  Reviewed preventive health counseling, as reflected in patient instructions       Regular exercise       Healthy diet/nutrition       Immunizations    Vaccinated for: Pneumococcal             Prostate cancer screening    BP Readings from Last 1 Encounters:   04/03/19 118/72     Estimated body mass index is 55.22 kg/m  as calculated from the following:    Height as of this encounter: 1.74 m (5' 8.5\").    Weight as of this encounter: 167.2 kg (368 lb 8 oz).      Weight management plan: Patient referred to endocrine and/or weight management specialty Discussed healthy diet and exercise guidelines     reports that he quit smoking about 48 years ago. he has never used smokeless tobacco.      Appropriate preventive services were discussed with this patient, including applicable screening as appropriate for cardiovascular disease, diabetes, osteopenia/osteoporosis, and glaucoma.  As appropriate for age/gender, discussed screening for colorectal cancer, prostate cancer, breast cancer, and cervical cancer. Checklist reviewing preventive services available has been given to the patient.    Reviewed patients plan of care and provided an AVS. The Basic Care Plan (routine screening as documented in Health Maintenance) for Demetri meets the Care Plan requirement. This Care Plan has been established and reviewed with the Patient and spouse.    Counseling Resources:  ATP IV Guidelines  Pooled Cohorts Equation Calculator  Breast Cancer Risk Calculator  FRAX Risk " Assessment  ICSI Preventive Guidelines  Dietary Guidelines for Americans, 2010  USDA's MyPlate  ASA Prophylaxis  Lung CA Screening    Cheko Osborn MD  [facility]

## 2019-04-03 NOTE — PATIENT INSTRUCTIONS
Preventive Health Recommendations:     See your health care provider every year to    Review health changes.     Discuss preventive care.      Review your medicines if your doctor has prescribed any.    Talk with your health care provider about whether you should have a test to screen for prostate cancer (PSA).    Every 3 years, have a diabetes test (fasting glucose). If you are at risk for diabetes, you should have this test more often.    Every 5 years, have a cholesterol test. Have this test more often if you are at risk for high cholesterol or heart disease.     Every 10 years, have a colonoscopy. Or, have a yearly FIT test (stool test). These exams will check for colon cancer.    Talk to with your health care provider about screening for Abdominal Aortic Aneurysm if you have a family history of AAA or have a history of smoking.  Shots:     Get a flu shot each year.     Get a tetanus shot every 10 years.     Talk to your doctor about your pneumonia vaccines. There are now two you should receive - Pneumovax (PPSV 23) and Prevnar (PCV 13).    Talk to your pharmacist about a shingles vaccine.     Talk to your doctor about the hepatitis B vaccine.  Nutrition:     Eat at least 5 servings of fruits and vegetables each day.     Eat whole-grain bread, whole-wheat pasta and brown rice instead of white grains and rice.     Get adequate Calcium and Vitamin D.   Lifestyle    Exercise for at least 150 minutes a week (30 minutes a day, 5 days a week). This will help you control your weight and prevent disease.     Limit alcohol to one drink per day.     No smoking.     Wear sunscreen to prevent skin cancer.     See your dentist every six months for an exam and cleaning.     See your eye doctor every 1 to 2 years to screen for conditions such as glaucoma, macular degeneration and cataracts.    Personalized Prevention Plan  You are due for the preventive services outlined below.  Your care team is available to assist you in  scheduling these services.  If you have already completed any of these items, please share that information with your care team to update in your medical record.    Health Maintenance Due   Topic Date Due     Zoster (Shingles) Vaccine (1 of 2) 02/17/2004     Eye Exam - yearly  02/05/2015     Liver Monitoring Lab - yearly  12/06/2018     Cholesterol Lab - yearly  12/06/2018     A1C (Diabetes) Lab - every 6 months  12/19/2018     Basic Metabolic Lab - every 6 months  01/18/2019     Annual Wellness Visit  02/17/2019     FALL RISK ASSESSMENT  02/17/2019     Pneumococcal Vaccine (1 of 2 - PCV13) 02/17/2019     Preventive Health Recommendations:     See your health care provider every year to    Review health changes.     Discuss preventive care.      Review your medicines if your doctor has prescribed any.    Talk with your health care provider about whether you should have a test to screen for prostate cancer (PSA).    Every 3 years, have a diabetes test (fasting glucose). If you are at risk for diabetes, you should have this test more often.    Every 5 years, have a cholesterol test. Have this test more often if you are at risk for high cholesterol or heart disease.     Every 10 years, have a colonoscopy. Or, have a yearly FIT test (stool test). These exams will check for colon cancer.    Talk to with your health care provider about screening for Abdominal Aortic Aneurysm if you have a family history of AAA or have a history of smoking.  Shots:     Get a flu shot each year.     Get a tetanus shot every 10 years.     Talk to your doctor about your pneumonia vaccines. There are now two you should receive - Pneumovax (PPSV 23) and Prevnar (PCV 13).    Talk to your pharmacist about a shingles vaccine.     Talk to your doctor about the hepatitis B vaccine.  Nutrition:     Eat at least 5 servings of fruits and vegetables each day.     Eat whole-grain bread, whole-wheat pasta and brown rice instead of white grains and rice.      Get adequate Calcium and Vitamin D.   Lifestyle    Exercise for at least 150 minutes a week (30 minutes a day, 5 days a week). This will help you control your weight and prevent disease.     Limit alcohol to one drink per day.     No smoking.     Wear sunscreen to prevent skin cancer.     See your dentist every six months for an exam and cleaning.     See your eye doctor every 1 to 2 years to screen for conditions such as glaucoma, macular degeneration and cataracts.    Personalized Prevention Plan  You are due for the preventive services outlined below.  Your care team is available to assist you in scheduling these services.  If you have already completed any of these items, please share that information with your care team to update in your medical record.    Health Maintenance Due   Topic Date Due     Zoster (Shingles) Vaccine (1 of 2) 02/17/2004     Eye Exam - yearly  02/05/2015     Liver Monitoring Lab - yearly  12/06/2018     Cholesterol Lab - yearly  12/06/2018     A1C (Diabetes) Lab - every 6 months  12/19/2018     Basic Metabolic Lab - every 6 months  01/18/2019     Annual Wellness Visit  02/17/2019     FALL RISK ASSESSMENT  02/17/2019     Pneumococcal Vaccine (1 of 2 - PCV13) 02/17/2019

## 2019-04-04 LAB
ALT SERPL W P-5'-P-CCNC: 31 U/L (ref 0–70)
ANION GAP SERPL CALCULATED.3IONS-SCNC: 7 MMOL/L (ref 3–14)
AST SERPL W P-5'-P-CCNC: 39 U/L (ref 0–45)
BUN SERPL-MCNC: 10 MG/DL (ref 7–30)
CALCIUM SERPL-MCNC: 9.1 MG/DL (ref 8.5–10.1)
CHLORIDE SERPL-SCNC: 103 MMOL/L (ref 94–109)
CHOLEST SERPL-MCNC: 126 MG/DL
CO2 SERPL-SCNC: 26 MMOL/L (ref 20–32)
CREAT SERPL-MCNC: 0.82 MG/DL (ref 0.66–1.25)
CREAT UR-MCNC: 124 MG/DL
GFR SERPL CREATININE-BSD FRML MDRD: >90 ML/MIN/{1.73_M2}
GLUCOSE SERPL-MCNC: 164 MG/DL (ref 70–99)
HDLC SERPL-MCNC: 42 MG/DL
LDLC SERPL CALC-MCNC: 67 MG/DL
MICROALBUMIN UR-MCNC: 12 MG/L
MICROALBUMIN/CREAT UR: 9.27 MG/G CR (ref 0–17)
NONHDLC SERPL-MCNC: 84 MG/DL
POTASSIUM SERPL-SCNC: 4.6 MMOL/L (ref 3.4–5.3)
PSA SERPL-ACNC: 0.13 UG/L (ref 0–4)
SODIUM SERPL-SCNC: 136 MMOL/L (ref 133–144)
TRIGL SERPL-MCNC: 85 MG/DL
TSH SERPL DL<=0.005 MIU/L-ACNC: 1.49 MU/L (ref 0.4–4)

## 2019-04-29 ENCOUNTER — OFFICE VISIT (OUTPATIENT)
Dept: CARDIOLOGY | Facility: CLINIC | Age: 64
End: 2019-04-29
Payer: COMMERCIAL

## 2019-04-29 VITALS
SYSTOLIC BLOOD PRESSURE: 122 MMHG | BODY MASS INDEX: 55.59 KG/M2 | WEIGHT: 315 LBS | HEART RATE: 100 BPM | DIASTOLIC BLOOD PRESSURE: 88 MMHG

## 2019-04-29 DIAGNOSIS — I42.9 CARDIOMYOPATHY, UNSPECIFIED TYPE (H): ICD-10-CM

## 2019-04-29 DIAGNOSIS — I48.21 PERMANENT ATRIAL FIBRILLATION (H): Primary | ICD-10-CM

## 2019-04-29 DIAGNOSIS — I10 BENIGN ESSENTIAL HYPERTENSION: ICD-10-CM

## 2019-04-29 PROCEDURE — 99214 OFFICE O/P EST MOD 30 MIN: CPT | Performed by: INTERNAL MEDICINE

## 2019-04-29 NOTE — LETTER
4/29/2019      Cheko Osborn MD  [address]  [facility] [address]      RE: Demetri Booth       Dear Colleague,    I had the pleasure of seeing Demetri Booth in the [facility].    Service Date: 04/29/2019      HISTORY OF PRESENT ILLNESS:    I had the pleasure of seeing Mr. Demetri Booth, a very nice 64-year-old male with the following cardiac arrest medical issues:   A.  Permanent atrial fibrillation.  Treated with rate control (metoprolol) and anticoagulation (apixaban).   B.  Mild chronic cardiomyopathy, EF typically 45%-50%.   C.  Severe obesity.   D.  Sleep apnea, on CPAP therapy.   E.  Diabetes mellitus type 2.   F.  Hypertension.   G.  Migraines.      Demetri underwent coronary angiography in 07/2018 because of chest pain.  He had minimal CAD without significant lumen obstruction.      He has been feeling well.  He tells me that over the past 6 months he self-decreased his dose of metoprolol from 150 mg b.i.d. to 100 mg b.i.d. because he read on the package insert that it can cause dizziness.  He told me that sometimes he felt dizzy.  More recently he has noticed that going up a flight of stairs makes him short of breath.  He does not have chest pain.  No syncope or near-syncope.      He has had recent issues with balance.      PHYSICAL EXAMINATION:   VITAL SIGNS:  Blood pressure 122/88, pulse between 70 and 90 at rest.  After a brief walk, no more than 50-60 yards, the patient's heart rate was 150 beats per minute.  He was mildly short of breath.  Weight 168 kilos (371 pounds).  Height 174 cm.   GENERAL:  He is a very pleasant and severely overweight gentleman in no acute distress.   NECK:  Thick, supple, without bruits.   LUNGS:  With distant breath sounds.  No apparent crackles.   CARDIOVASCULAR:  Irregularly irregular rhythm, no apparent gallop or murmur.   ABDOMEN:  Extremely obese, soft.   EXTREMITIES:  He wears support stockings bilaterally.  He has at least mild edema.       DIAGNOSTIC STUDIES:    Most recent ECG from 2018 showed atrial fibrillation with controlled ventricular rate.    Most recent fasting lipid panel: Cholesterol 126, HDL 42, LDL 67, triglycerides 85.      IMPRESSION:   1.  Permanent atrial fibrillation.  zaria self-decreased metoprolol but now his heart rate is quite fast with minimal exertion.  I asked him to increase the morning dose to 150 mg and stay at 100 mg in the evening.  Continue apixaban for anticoagulation.   2.  Minimal CAD, mild cardiomyopathy.  Continuation of his CPAP was strongly recommended.  He has an excellent lipid profile.  His blood pressure was good today.  He needs to lose weight urgently, but has been unsuccessful in accomplishing this over the years.      RECOMMENDATIONS:   A.  Increase metoprolol dose, as above.   B.  Follow up with Luz Maria in 18 months.      I appreciate the opportunity to be part of his care.  Please feel free to contact me with any questions.       LITTLE AUGUST MD, FACC         cc:   [place] [address]             D: 2019   T: 2019   MT: SINDHU      Name:     ZARIA MOONEY   MRN:      [id number]        Account:      [id number]   :      1955           Service Date: 2019      Document: [id number]           Outpatient Encounter Medications as of 2019   Medication Sig Dispense Refill     Acetaminophen (TYLENOL PO) Take 500 mg by mouth every 4 hours as needed        amLODIPine (NORVASC) 5 MG tablet Take 1 tablet (5 mg) by mouth daily 90 tablet 1     apixaban ANTICOAGULANT (ELIQUIS) 5 MG tablet Take 1 tablet (5 mg) by mouth 2 times daily 180 tablet 1     atorvastatin (LIPITOR) 20 MG tablet Take 0.5 tablets (10 mg) by mouth daily 45 tablet 0     blood glucose (ACCU-CHEK SMARTVIEW) test strip by In Vitro route daily Reported on 2/15/2017       blood glucose monitoring (ACCU-CHEK FASTCLIX) lancets 1 each  by In Vitro route daily Reported on 2/15/2017       clobetasol (TEMOVATE) 0.05 % external cream APPLY TOPICALLY TWICE DAILY AS NEEDED FOR UP TO TWO WEEKS, THEN INTERMITTENLY. 30 g 3     glimepiride (AMARYL) 4 MG tablet TAKE 1 TABLET BY MOUTH EVERY MORNING BEFORE BREAKFAST 90 tablet 0     hydrocortisone 1 % ointment Apply sparingly to affected area. 30 g 0     lisinopril (PRINIVIL/ZESTRIL) 5 MG tablet Take 1 tablet (5 mg) by mouth 2 times daily 180 tablet 0     metFORMIN (GLUCOPHAGE) 1000 MG tablet TAKE 1 TABLET BY MOUTH TWICE DAILY WITH MEALS 180 tablet 0     metoprolol tartrate (LOPRESSOR) 100 MG tablet Take 1.5 tablets (150 mg) by mouth 2 times daily TAKE 1 1/2 TABLETS BY MOUTH TWICE DAILY 90 tablet 3     multivitamin, therapeutic with minerals (THERA-VIT-M) TABS Take 1 tablet by mouth daily.       order for DME Equipment being ordered: JOBST compression stockings knee high 20-30 mm compression 2 Device 1     order for DME CPAP every night   Needs small travel sized CPAP machine 1 Device 0     Tazarotene (TAZORAC) 0.05 % CREA Externally apply  topically daily as needed. Indications: Plaque Psoriasis       tobramycin-dexamethasone (TOBRADEX) 0.3-0.1 % ophthalmic susp Place 1 drop Into the left eye 4 times daily  0     No facility-administered encounter medications on file as of 4/29/2019.        Again, thank you for allowing me to participate in the care of your patient.      Sincerely,    Chelsea Bird MD     [facility]
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
## 2019-04-29 NOTE — PROGRESS NOTES
Service Date: 04/29/2019      HISTORY OF PRESENT ILLNESS:    I had the pleasure of seeing Mr. Demetri Booth, a very nice 64-year-old male with the following cardiac arrest medical issues:   A.  Permanent atrial fibrillation.  Treated with rate control (metoprolol) and anticoagulation (apixaban).   B.  Mild chronic cardiomyopathy, EF typically 45%-50%.   C.  Severe obesity.   D.  Sleep apnea, on CPAP therapy.   E.  Diabetes mellitus type 2.   F.  Hypertension.   G.  Migraines.      Demetri underwent coronary angiography in 07/2018 because of chest pain.  He had minimal CAD without significant lumen obstruction.      He has been feeling well.  He tells me that over the past 6 months he self-decreased his dose of metoprolol from 150 mg b.i.d. to 100 mg b.i.d. because he read on the package insert that it can cause dizziness.  He told me that sometimes he felt dizzy.  More recently he has noticed that going up a flight of stairs makes him short of breath.  He does not have chest pain.  No syncope or near-syncope.      He has had recent issues with balance.      PHYSICAL EXAMINATION:   VITAL SIGNS:  Blood pressure 122/88, pulse between 70 and 90 at rest.  After a brief walk, no more than 50-60 yards, the patient's heart rate was 150 beats per minute.  He was mildly short of breath.  Weight 168 kilos (371 pounds).  Height 174 cm.   GENERAL:  He is a very pleasant and severely overweight gentleman in no acute distress.   NECK:  Thick, supple, without bruits.   LUNGS:  With distant breath sounds.  No apparent crackles.   CARDIOVASCULAR:  Irregularly irregular rhythm, no apparent gallop or murmur.   ABDOMEN:  Extremely obese, soft.   EXTREMITIES:  He wears support stockings bilaterally.  He has at least mild edema.      DIAGNOSTIC STUDIES:    Most recent ECG from 07/2018 showed atrial fibrillation with controlled ventricular rate.    Most recent fasting lipid panel: Cholesterol 126, HDL 42, LDL 67, triglycerides 85.       IMPRESSION:   1.  Permanent atrial fibrillation.  zaria self-decreased metoprolol but now his heart rate is quite fast with minimal exertion.  I asked him to increase the morning dose to 150 mg and stay at 100 mg in the evening.  Continue apixaban for anticoagulation.   2.  Minimal CAD, mild cardiomyopathy.  Continuation of his CPAP was strongly recommended.  He has an excellent lipid profile.  His blood pressure was good today.  He needs to lose weight urgently, but has been unsuccessful in accomplishing this over the years.      RECOMMENDATIONS:   A.  Increase metoprolol dose, as above.   B.  Follow up with Luz Maria in 18 months.      I appreciate the opportunity to be part of his care.  Please feel free to contact me with any questions.       LITTLE AUGUST MD, FACC         cc:   [place] [address]             D: 2019   T: 2019   MT: SINDHU      Name:     ZARIA MOONEY   MRN:      [id number]        Account:      [id number]   :      1955           Service Date: 2019      Document: [id number]

## 2019-04-30 NOTE — PROGRESS NOTES
SUBJECTIVE:   Demetri Booth is a 64 year old male who presents to clinic today for the following   health issues:      Diabetes Follow-up      Patient is checking blood sugars: not at all    Diabetic concerns: None     Symptoms of hypoglycemia (low blood sugar): shaky, dizzy, weak-always in the afternoon     Paresthesias (numbness or burning in feet) or sores: Yes-mild numbness     Date of last diabetic eye exam: almost 1 year ago    He is due for diabetic eye exam in July     Diabetes Management Resources    Hyperlipidemia Follow-Up      Rate your low fat/cholesterol diet?: good    Taking statin?  Yes, no muscle aches from statin    Other lipid medications/supplements?:  none    Hypertension Follow-up      Outpatient blood pressures are not being checked.    Low Salt Diet: low salt    BP Readings from Last 2 Encounters:   04/29/19 122/88   04/03/19 118/72     Hemoglobin A1C (%)   Date Value   04/03/2019 7.8 (H)   06/19/2018 7.3 (H)     LDL Cholesterol Calculated (mg/dL)   Date Value   04/03/2019 67   12/06/2017 86       Amount of exercise or physical activity: daily chores around house and starting to lift weights, working on walking stairs often    Problems taking medications regularly: No    Medication side effects: feels dizzy/balance issues    Diet: diabetic, low salt            Additional history: as documented    Reviewed  and updated as needed this visit by clinical staff         Reviewed and updated as needed this visit by Provider         Labs reviewed in EPIC    ROS:  CONSTITUTIONAL:NEGATIVE for fever, chills, change in weight  RESP:NEGATIVE for significant cough or SOB  CV: NEGATIVE for chest pain, palpitations or peripheral edema  ENDOCRINE: NEGATIVE for temperature intolerance, skin/hair changes and POSITIVE  for HX diabetes    OBJECTIVE:                                                    /76   Pulse 79   Temp 96.8  F (36  C) (Tympanic)   Resp 16   Wt (!) 166.5 kg (367 lb)   SpO2 97%   BMI  54.99 kg/m    Body mass index is 54.99 kg/m .  GENERAL APPEARANCE: healthy, alert and no distress  RESP: lungs clear to auscultation - no rales, rhonchi or wheezes  CV: regular rates and rhythm, normal S1 S2, no S3 or S4 and no murmur, click or rub  NEURO: Normal strength and tone, mentation intact and speech normal    Diagnostic test results:  None, future orders placed      ASSESSMENT/PLAN:                                                        ICD-10-CM    1. DM (diabetes mellitus) type II uncontrolled, periph vascular disorder (H) E11.51 sitagliptin (JANUVIA) 100 MG tablet    E11.65 Hemoglobin A1c     Albumin Random Urine Quantitative with Creat Ratio     Basic metabolic panel       Follow up with Provider - 3 mo    Start taking Januvia 100 mg daily in addition to other medications  Patient Instructions   Work on losing weight, keep working to gradually lose weight      Cheko Osborn MD  [facility]

## 2019-05-01 ENCOUNTER — OFFICE VISIT (OUTPATIENT)
Dept: FAMILY MEDICINE | Facility: CLINIC | Age: 64
End: 2019-05-01
Payer: COMMERCIAL

## 2019-05-01 VITALS
OXYGEN SATURATION: 97 % | SYSTOLIC BLOOD PRESSURE: 128 MMHG | DIASTOLIC BLOOD PRESSURE: 76 MMHG | TEMPERATURE: 96.8 F | HEART RATE: 79 BPM | RESPIRATION RATE: 16 BRPM | WEIGHT: 315 LBS | BODY MASS INDEX: 54.99 KG/M2

## 2019-05-01 PROCEDURE — 99213 OFFICE O/P EST LOW 20 MIN: CPT | Performed by: FAMILY MEDICINE

## 2019-05-03 DIAGNOSIS — E11.9 TYPE 2 DIABETES MELLITUS WITHOUT COMPLICATION, WITHOUT LONG-TERM CURRENT USE OF INSULIN (H): ICD-10-CM

## 2019-05-03 NOTE — TELEPHONE ENCOUNTER
Requested Prescriptions   Pending Prescriptions Disp Refills     metFORMIN (GLUCOPHAGE) 1000 MG tablet [Pharmacy Med Name: METFORMIN HCL 1,000 MG TABLET]  Last Written Prescription Date:  4/3/2019  Last Fill Quantity: 180 tablet,  # refills: 0   Last office visit: 5/1/2019 with prescribing provider:  Anurag   Future Office Visit:   Next 5 appointments (look out 90 days)    Jun 12, 2019 11:45 AM CDT  SHORT with Cheko Osborn MD  [facility] ([facility]) [address] [id number] [phone]          180 tablet 0     Sig: TAKE 1 TABLET BY MOUTH TWICE A DAY WITH MEALS. NEED APPOINTMENT FOR FURTHER REFILLS       Biguanide Agents Passed - 5/3/2019 12:34 PM        Passed - Blood pressure less than 140/90 in past 6 months     BP Readings from Last 3 Encounters:   05/01/19 128/76   04/29/19 122/88   04/03/19 118/72                 Passed - Patient has documented LDL within the past 12 mos.     Recent Labs   Lab Test 04/03/19  0930   LDL 67             Passed - Patient has had a Microalbumin in the past 15 mos.     Recent Labs   Lab Test 04/03/19  0931   MICROL 12   UMALCR 9.27             Passed - Patient is age 10 or older        Passed - Patient has documented A1c within the specified period of time.     If HgbA1C is 8 or greater, it needs to be on file within the past 3 months.  If less than 8, must be on file within the past 6 months.     Recent Labs   Lab Test 04/03/19  0930   A1C 7.8*             Passed - Patient's CR is NOT>1.4 OR Patient's EGFR is NOT<45 within past 12 mos.     Recent Labs   Lab Test 04/03/19  0930   GFRESTIMATED >90   GFRESTBLACK >90       Recent Labs   Lab Test 04/03/19  0930   CR 0.82             Passed - Patient does NOT have a diagnosis of CHF.        Passed - Medication is active on med list        Passed - Recent (6 mo) or future (30 days) visit within the authorizing provider's specialty     " Patient had office visit in the last 6 months or has a visit in the next 30 days with authorizing provider or within the authorizing provider's specialty.  See \"Patient Info\" tab in inbasket, or \"Choose Columns\" in Meds & Orders section of the refill encounter.            glimepiride (AMARYL) 4 MG tablet [Pharmacy Med Name: GLIMEPIRIDE 4 MG TABLET]  Last Written Prescription Date:  4/3/2019  Last Fill Quantity: 90 tablet,  # refills: 0   Last office visit: 5/1/2019 with prescribing provider:  Anurag   Future Office Visit:   Next 5 appointments (look out 90 days)    Jun 12, 2019 11:45 AM CDT  [facility] [address] [place] [id number]  [phone]          90 tablet 0     Sig: TAKE 1 TABLET BY MOUTH EVERY DAY BEFORE BREAKFAST       Sulfonylurea Agents Passed - 5/3/2019 12:34 PM        Passed - Blood pressure less than 140/90 in past 6 months     BP Readings from Last 3 Encounters:   05/01/19 128/76   04/29/19 122/88   04/03/19 118/72                 Passed - Patient has documented LDL within the past 12 mos.     Recent Labs   Lab Test 04/03/19  0930   LDL 67             Passed - Patient has had a Microalbumin in the past 12 mos.     Recent Labs   Lab Test 04/03/19  0931   MICROL 12   UMALCR 9.27             Passed - Patient has documented A1c within the specified period of time.     If HgbA1C is 8 or greater, it needs to be on file within the past 3 months.  If less than 8, must be on file within the past 6 months.     Recent Labs   Lab Test 04/03/19  0930   A1C 7.8*             Passed - Medication is active on med list        Passed - Patient is age 18 or older        Passed - Patient has a recent creatinine (normal) within the past 12 mos.     Recent Labs   Lab Test 04/03/19  0930   CR 0.82             Passed - Recent (6 mo) or future (30 days) visit within the authorizing " "provider's specialty     Patient had office visit in the last 6 months or has a visit in the next 30 days with authorizing provider or within the authorizing provider's specialty.  See \"Patient Info\" tab in inbasket, or \"Choose Columns\" in Meds & Orders section of the refill encounter.               "

## 2019-05-06 RX ORDER — GLIMEPIRIDE 4 MG/1
TABLET ORAL
Qty: 90 TABLET | Refills: 0 | OUTPATIENT
Start: 2019-05-06

## 2019-05-10 ENCOUNTER — TELEPHONE (OUTPATIENT)
Dept: FAMILY MEDICINE | Facility: CLINIC | Age: 64
End: 2019-05-10

## 2019-05-10 DIAGNOSIS — G47.33 OBSTRUCTIVE SLEEP APNEA SYNDROME: Primary | ICD-10-CM

## 2019-05-10 NOTE — TELEPHONE ENCOUNTER
Reason for Call: Request for an order or referral:    Order or referral being requested: DME for CPAP    Date needed: as soon as possible    Has the patient been seen by the PCP for this problem? YES    Additional comments: Patient needs a new CPAP machine. Needs new order faxed to [phone] ( [facility]). If any questions please call    Phone number Patient can be reached at:  Other phone number:  [phone]    Best Time:  any    Can we leave a detailed message on this number?  YES    Call taken on 5/10/2019 at 2:19 PM by LENORA GALINDO

## 2019-05-13 DIAGNOSIS — I10 HYPERTENSION GOAL BP (BLOOD PRESSURE) < 130/80: ICD-10-CM

## 2019-05-13 RX ORDER — METOPROLOL TARTRATE 100 MG
150 TABLET ORAL 2 TIMES DAILY
Qty: 270 TABLET | Refills: 3 | Status: SHIPPED | OUTPATIENT
Start: 2019-05-13 | End: 2020-12-15

## 2019-05-13 NOTE — TELEPHONE ENCOUNTER
[facility]: fax [phone]. Fax order if provider approval received.       Referral request pended for provider approval.

## 2019-05-29 ENCOUNTER — TELEPHONE (OUTPATIENT)
Dept: FAMILY MEDICINE | Facility: CLINIC | Age: 64
End: 2019-05-29

## 2019-05-29 NOTE — TELEPHONE ENCOUNTER
Our goal is to have forms completed within 72 hours, however some forms may require a visit or additional information.    What clinic location was the form placed at [facility] or [place].?     Who is the form from?   Where did the form come from? Faxed to clinic   The form was placed in the inbox of Cheko Osborn MD      Please fax to [phone]  Phone number: [phone]    Additional comments: consuelo buitrago cpap rx need MD sig    Call take on 5/29/2019 at 3:03 PM by Leslie Oates

## 2019-05-30 NOTE — TELEPHONE ENCOUNTER
[employer] agent called requesting status of form. Writer explained form was received 05/29/2019 and is waiting for PCP to review at [facility]  05/31/2019.

## 2019-05-31 ENCOUNTER — MEDICAL CORRESPONDENCE (OUTPATIENT)
Dept: HEALTH INFORMATION MANAGEMENT | Facility: CLINIC | Age: 64
End: 2019-05-31

## 2019-06-05 ENCOUNTER — TELEPHONE (OUTPATIENT)
Dept: FAMILY MEDICINE | Facility: CLINIC | Age: 64
End: 2019-06-05

## 2019-06-05 NOTE — TELEPHONE ENCOUNTER
Our goal is to have forms completed within 72 hours, however some forms may require a visit or additional information.    What clinic location was the form placed at [facility] or [facility].?     Who is the form from?   Where did the form come from? Faxed to clinic   The form was placed in the inbox of Cheko Osborn MD      Please fax to [phone]  Phone number: [phone]    Additional comments: [facility] CPAP supplies    Call take on 6/5/2019 at 2:34 PM by Leslie Oates

## 2019-06-06 ENCOUNTER — MEDICAL CORRESPONDENCE (OUTPATIENT)
Dept: HEALTH INFORMATION MANAGEMENT | Facility: CLINIC | Age: 64
End: 2019-06-06

## 2019-06-12 ENCOUNTER — OFFICE VISIT (OUTPATIENT)
Dept: FAMILY MEDICINE | Facility: CLINIC | Age: 64
End: 2019-06-12
Payer: COMMERCIAL

## 2019-06-12 VITALS
RESPIRATION RATE: 20 BRPM | OXYGEN SATURATION: 98 % | TEMPERATURE: 96.8 F | BODY MASS INDEX: 55.81 KG/M2 | WEIGHT: 315 LBS | HEART RATE: 108 BPM | DIASTOLIC BLOOD PRESSURE: 90 MMHG | SYSTOLIC BLOOD PRESSURE: 132 MMHG

## 2019-06-12 DIAGNOSIS — I42.9 CARDIOMYOPATHY, UNSPECIFIED TYPE (H): ICD-10-CM

## 2019-06-12 DIAGNOSIS — I10 ESSENTIAL HYPERTENSION, BENIGN: ICD-10-CM

## 2019-06-12 DIAGNOSIS — G47.33 OBSTRUCTIVE SLEEP APNEA SYNDROME: ICD-10-CM

## 2019-06-12 PROCEDURE — 99214 OFFICE O/P EST MOD 30 MIN: CPT | Performed by: FAMILY MEDICINE

## 2019-06-12 RX ORDER — GLUCOSAMINE HCL/CHONDROITIN SU 500-400 MG
CAPSULE ORAL
Qty: 200 EACH | Refills: 3 | Status: SHIPPED | OUTPATIENT
Start: 2019-06-12 | End: 2021-02-09

## 2019-06-12 RX ORDER — LANCETS
EACH MISCELLANEOUS
Qty: 200 EACH | Refills: 6 | Status: SHIPPED | OUTPATIENT
Start: 2019-06-12 | End: 2022-01-20

## 2019-06-12 RX ORDER — LANCETS
EACH MISCELLANEOUS
Qty: 102 EACH | Status: CANCELLED | OUTPATIENT
Start: 2019-06-12

## 2019-06-12 NOTE — PROGRESS NOTES
Subjective     Demetri Booth is a 64 year old male who presents to clinic today for the following health issues:    HPI   Patient is accompanied by wife.    Additional concerns today: dizziness with Glimepiride    Diabetes Follow-up      How often are you checking your blood sugar? Not at all    What time of day are you checking your blood sugars (select all that apply)?  N/A    Have you had any blood sugars above 200? N/A    Have you had any blood sugars below 70?  N/A    What symptoms do you notice when your blood sugar is low?  Shaky, Dizzy and Weak in the afternoon    What concerns do you have today about your diabetes? Other: Glimeperide     Do you have any of these symptoms? (Select all that apply)  Numbness in feet     Have you had a diabetic eye exam in the last 12 months? Yes- Date of last eye exam: 2018    BP Readings from Last 2 Encounters:   06/12/19 132/90   05/01/19 128/76     Hemoglobin A1C (%)   Date Value   04/03/2019 7.8 (H)   06/19/2018 7.3 (H)     LDL Cholesterol Calculated (mg/dL)   Date Value   04/03/2019 67   12/06/2017 86       Diabetes Management Resources    Hypertension Follow-up      Do you check your blood pressure regularly outside of the clinic? Yes     Are you following a low salt diet? Yes    Are your blood pressures ever more than 140 on the top number (systolic) OR more   than 90 on the bottom number (diastolic), for example 140/90? No    Recent Labs   Lab Test 04/03/19  0930 07/18/18  1042  06/19/18  1103  12/06/17  1644 04/12/17  0740   A1C 7.8*  --   --  7.3*  --  6.8* 7.0*   LDL 67  --   --   --   --  86 82   HDL 42  --   --   --   --  52 52   TRIG 85  --   --   --   --  108 85   ALT 31  --   --   --   --  36 35   CR 0.82 0.85   < > 0.82   < > 0.95 0.91   GFRESTIMATED >90 >90   < > >90   < > 79 84   GFRESTBLACK >90 >90   < > >90   < > >90 >90  African American GFR Calc     POTASSIUM 4.6 4.5   < > 4.6   < > 4.7 4.3   TSH 1.49  --   --   --   --  2.19  --     < > = values in this  interval not displayed.      BP Readings from Last 3 Encounters:   06/12/19 132/90   05/01/19 128/76   04/29/19 122/88    Wt Readings from Last 3 Encounters:   06/12/19 (!) 169 kg (372 lb 8 oz)   05/01/19 (!) 166.5 kg (367 lb)   04/29/19 (!) 168.3 kg (371 lb)                      Reviewed and updated as needed this visit by Provider         Review of Systems   ROS COMP: CONSTITUTIONAL: NEGATIVE for fever, chills, change in weight  ENT/MOUTH: NEGATIVE for ear, mouth and throat problems  RESP: NEGATIVE for significant cough or SOB  CV: NEGATIVE for chest pain, palpitations or peripheral edema  NEURO: POSITIVE for dizziness/lightheadedness  ENDOCRINE: NEGATIVE for temperature intolerance, skin/hair changes and POSITIVE  for HX diabetes      Objective    /90 (BP Location: Left arm, Patient Position: Sitting, Cuff Size: Adult Large)   Pulse 108   Temp 96.8  F (36  C) (Skin)   Resp 20   Wt (!) 169 kg (372 lb 8 oz)   SpO2 98%   BMI 55.81 kg/m    Body mass index is 55.81 kg/m .  Physical Exam   GENERAL: healthy, alert and no distress  NECK: no adenopathy, no asymmetry, masses, or scars and thyroid normal to palpation  RESP: lungs clear to auscultation - no rales, rhonchi or wheezes  CV: regular rate and rhythm, normal S1 S2, no S3 or S4, no murmur, click or rub, no peripheral edema and peripheral pulses strong  ABDOMEN: soft, nontender, no hepatosplenomegaly, no masses and bowel sounds normal  MS: no gross musculoskeletal defects noted, no edema    Diagnostic Test Results:  Labs reviewed in Epic  none         Assessment & Plan     Demetri was seen today for diabetes.    Diagnoses and all orders for this visit:    DM (diabetes mellitus) type II uncontrolled, periph vascular disorder (H)  -     blood glucose monitoring (ACCU-CHEK COMPACT CARE KIT) meter device kit; Use to test blood sugars 2 times daily.  -     blood glucose (ACCU-CHEK COMPACT DRUM) test strip; Use to test blood sugars 2 times daily.  -     blood  "glucose monitoring (SOFTCLIX) lancets; Use to test blood sugar 2 times daily.  -     alcohol swab prep pads; Use to swab area of injection/janessa as directed.    Essential hypertension, benign    Cardiomyopathy, unspecified type (H)    Obstructive sleep apnea syndrome         BMI:   Estimated body mass index is 55.81 kg/m  as calculated from the following:    Height as of 4/3/19: 1.74 m (5' 8.5\").    Weight as of this encounter: 169 kg (372 lb 8 oz).   Weight management plan: Discussed healthy diet and exercise guidelines        FUTURE APPOINTMENTS:       - Follow-up visit in 1 mo     Patient Instructions   Stop the Amlodipine   Cut the Glimepiride in half, take just 2 mg daily       Return in about 1 month (around 7/12/2019) for Hypertension, Diabetes.    Cheko Osborn MD  [facility]        "

## 2019-06-19 DIAGNOSIS — I10 HYPERTENSION GOAL BP (BLOOD PRESSURE) < 130/80: ICD-10-CM

## 2019-06-19 NOTE — TELEPHONE ENCOUNTER
"Requested Prescriptions   Pending Prescriptions Disp Refills     lisinopril (PRINIVIL/ZESTRIL) 5 MG tablet [Pharmacy Med Name: LISINOPRIL 5 MG TABLET]  Last Written Prescription Date:  3/25/2019  Last Fill Quantity: 180 tablet,  # refills: 0   Last office visit: 6/12/2019 with prescribing provider:  Anurag   Future Office Visit:   Next 5 appointments (look out 90 days)    Jul 17, 2019  9:00 AM CDT  SHORT with Cheko Osborn MD  [facility] ([facility]) [address] [place] [id number] [phone]          180 tablet 0     Sig: TAKE 1 TABLET (5 MG) BY MOUTH 2 TIMES DAILY       ACE Inhibitors (Including Combos) Protocol Failed - 6/19/2019  8:40 AM        Failed - Blood pressure under 140/90 in past 12 months     BP Readings from Last 3 Encounters:   06/12/19 132/90   05/01/19 128/76   04/29/19 122/88                 Passed - Recent (12 mo) or future (30 days) visit within the authorizing provider's specialty     Patient had office visit in the last 12 months or has a visit in the next 30 days with authorizing provider or within the authorizing provider's specialty.  See \"Patient Info\" tab in inbasket, or \"Choose Columns\" in Meds & Orders section of the refill encounter.              Passed - Medication is active on med list        Passed - Patient is age 18 or older        Passed - Normal serum creatinine on file in past 12 months     Recent Labs   Lab Test 04/03/19  0930   CR 0.82             Passed - Normal serum potassium on file in past 12 months     Recent Labs   Lab Test 04/03/19  0930   POTASSIUM 4.6                "

## 2019-06-24 RX ORDER — LISINOPRIL 5 MG/1
5 TABLET ORAL 2 TIMES DAILY
Qty: 60 TABLET | Refills: 0 | Status: SHIPPED | OUTPATIENT
Start: 2019-06-24 | End: 2019-08-28

## 2019-06-24 NOTE — TELEPHONE ENCOUNTER
Medication is being filled for 1 time refill only due to:  Patient needs to be seen because due for hypertension and diabetes follow up, appt scheduled.

## 2019-07-12 LAB — HBA1C MFR BLD: 6.7 % (ref 0–5.6)

## 2019-07-12 PROCEDURE — 80048 BASIC METABOLIC PNL TOTAL CA: CPT | Performed by: FAMILY MEDICINE

## 2019-07-12 PROCEDURE — 36415 COLL VENOUS BLD VENIPUNCTURE: CPT | Performed by: FAMILY MEDICINE

## 2019-07-12 PROCEDURE — 83036 HEMOGLOBIN GLYCOSYLATED A1C: CPT | Performed by: FAMILY MEDICINE

## 2019-07-13 DIAGNOSIS — E11.9 TYPE 2 DIABETES MELLITUS WITHOUT COMPLICATION, WITHOUT LONG-TERM CURRENT USE OF INSULIN (H): ICD-10-CM

## 2019-07-13 LAB
ANION GAP SERPL CALCULATED.3IONS-SCNC: 5 MMOL/L (ref 3–14)
BUN SERPL-MCNC: 8 MG/DL (ref 7–30)
CALCIUM SERPL-MCNC: 8.6 MG/DL (ref 8.5–10.1)
CHLORIDE SERPL-SCNC: 104 MMOL/L (ref 94–109)
CO2 SERPL-SCNC: 28 MMOL/L (ref 20–32)
CREAT SERPL-MCNC: 0.85 MG/DL (ref 0.66–1.25)
GFR SERPL CREATININE-BSD FRML MDRD: >90 ML/MIN/{1.73_M2}
GLUCOSE SERPL-MCNC: 102 MG/DL (ref 70–99)
POTASSIUM SERPL-SCNC: 4.3 MMOL/L (ref 3.4–5.3)
SODIUM SERPL-SCNC: 137 MMOL/L (ref 133–144)

## 2019-07-13 NOTE — TELEPHONE ENCOUNTER
Requested Prescriptions   Pending Prescriptions Disp Refills     metFORMIN (GLUCOPHAGE) 1000 MG tablet [Pharmacy Med Name: METFORMIN HCL 1,000 MG TABLET] 180 tablet 0     Sig: TAKE 1 TABLET BY MOUTH TWICE A DAY WITH MEALS  Last Written Prescription Date:  4/3/2019  Last Fill Quantity: 180tablet,  # refills: 0   Last Office Visit: 6/12/2019 Anurag  Future Office Visit:    Next 5 appointments (look out 90 days)    Jul 17, 2019  9:00 AM CDT  SHORT with Cheko Osborn MD  [facility] ([facility]) [address] [address]  [phone]                Biguanide Agents Failed - 7/13/2019  8:28 AM        Failed - Blood pressure less than 140/90 in past 6 months     BP Readings from Last 3 Encounters:   06/12/19 132/90   05/01/19 128/76   04/29/19 122/88           Passed - Patient has documented LDL within the past 12 mos.     Recent Labs   Lab Test 04/03/19  0930   LDL 67             Passed - Patient has had a Microalbumin in the past 15 mos.     Recent Labs   Lab Test 04/03/19  0931   MICROL 12   UMALCR 9.27             Passed - Patient is age 10 or older        Passed - Patient has documented A1c within the specified period of time.     If HgbA1C is 8 or greater, it needs to be on file within the past 3 months.  If less than 8, must be on file within the past 6 months.     Recent Labs   Lab Test 07/12/19  1131   A1C 6.7*             Passed - Patient's CR is NOT>1.4 OR Patient's EGFR is NOT<45 within past 12 mos.     Recent Labs   Lab Test 07/12/19  1131   GFRESTIMATED >90   GFRESTBLACK >90       Recent Labs   Lab Test 07/12/19  1131   CR 0.85           Passed - Patient does NOT have a diagnosis of CHF.        Passed - Medication is active on med list        Passed - Recent (6 mo) or future (30 days) visit within the authorizing provider's specialty     Patient had office visit in the last 6 months or has a visit in the  "next 30 days with authorizing provider or within the authorizing provider's specialty.  See \"Patient Info\" tab in inbasket, or \"Choose Columns\" in Meds & Orders section of the refill encounter.            glimepiride (AMARYL) 4 MG tablet [Pharmacy Med Name: GLIMEPIRIDE 4 MG TABLET] 90 tablet 0     Sig: TAKE 1 TABLET BY MOUTH EVERY DAY BEFORE BREAKFAST  Last Written Prescription Date:  4/3/2019  Last Fill Quantity: 90tablet,  # refills: 0   Last Office Visit: 6/12/2019   Future Office Visit:    Next 5 appointments (look out 90 days)    Jul 17, 2019  9:00 AM CDT  SHORT with Cheko Osborn MD  [facility] ([facility]) [address] [place] [address]  [phone]                Sulfonylurea Agents Failed - 7/13/2019  8:28 AM        Failed - Blood pressure less than 140/90 in past 6 months     BP Readings from Last 3 Encounters:   06/12/19 132/90   05/01/19 128/76   04/29/19 122/88           Passed - Patient has documented LDL within the past 12 mos.     Recent Labs   Lab Test 04/03/19  0930   LDL 67             Passed - Patient has had a Microalbumin in the past 15 mos.     Recent Labs   Lab Test 04/03/19  0931   MICROL 12   UMALCR 9.27             Passed - Patient has documented A1c within the specified period of time.     If HgbA1C is 8 or greater, it needs to be on file within the past 3 months.  If less than 8, must be on file within the past 6 months.     Recent Labs   Lab Test 07/12/19  1131   A1C 6.7*             Passed - Medication is active on med list        Passed - Patient is age 18 or older        Passed - Patient has a recent creatinine (normal) within the past 12 mos.     Recent Labs   Lab Test 07/12/19  1131   CR 0.85             Passed - Recent (6 mo) or future (30 days) visit within the authorizing provider's specialty     Patient had office visit in the last 6 months or has a visit in the next 30 days with " "authorizing provider or within the authorizing provider's specialty.  See \"Patient Info\" tab in inbasket, or \"Choose Columns\" in Meds & Orders section of the refill encounter.              "

## 2019-07-15 RX ORDER — GLIMEPIRIDE 4 MG/1
TABLET ORAL
Qty: 90 TABLET | Refills: 0 | Status: SHIPPED | OUTPATIENT
Start: 2019-07-15 | End: 2019-10-22

## 2019-07-17 ENCOUNTER — OFFICE VISIT (OUTPATIENT)
Dept: FAMILY MEDICINE | Facility: CLINIC | Age: 64
End: 2019-07-17
Payer: COMMERCIAL

## 2019-07-17 VITALS
SYSTOLIC BLOOD PRESSURE: 130 MMHG | DIASTOLIC BLOOD PRESSURE: 86 MMHG | RESPIRATION RATE: 20 BRPM | BODY MASS INDEX: 55.89 KG/M2 | OXYGEN SATURATION: 99 % | TEMPERATURE: 98.1 F | WEIGHT: 315 LBS | HEART RATE: 98 BPM

## 2019-07-17 DIAGNOSIS — E78.00 HYPERCHOLESTEROLEMIA: ICD-10-CM

## 2019-07-17 DIAGNOSIS — I42.9 CARDIOMYOPATHY, UNSPECIFIED TYPE (H): ICD-10-CM

## 2019-07-17 DIAGNOSIS — I10 ESSENTIAL HYPERTENSION, BENIGN: ICD-10-CM

## 2019-07-17 DIAGNOSIS — E66.01 MORBID OBESITY (H): ICD-10-CM

## 2019-07-17 DIAGNOSIS — J44.9 CHRONIC OBSTRUCTIVE PULMONARY DISEASE, UNSPECIFIED COPD TYPE (H): ICD-10-CM

## 2019-07-17 PROCEDURE — 99214 OFFICE O/P EST MOD 30 MIN: CPT | Performed by: FAMILY MEDICINE

## 2019-07-17 NOTE — PROGRESS NOTES
Subjective     Demetri Booth is a 64 year old male who presents to clinic today for the following health issues:    HPI     Here today with wife.    Pt fell at home last week.  He could not get up by himself and she could not get him up even with help.  They needed to call 911 to get him up    Recent lab work done 7/12/19    Diabetes Follow-up      How often are you checking your blood sugar? Not at all    What time of day are you checking your blood sugars (select all that apply)?  N/A    Have you had any blood sugars above 200? N/A    Have you had any blood sugars below 70?  N/A    What symptoms do you notice when your blood sugar is low?  Shaky, Dizzy and Weak in the afternoon    What concerns do you have today about your diabetes? Other: Glimeperide     Do you have any of these symptoms? (Select all that apply)  Numbness in feet     Have you had a diabetic eye exam in the last 12 months? Yes- Date of last eye exam: 2018    Hemoglobin A1C   Date Value Ref Range Status   07/12/2019 6.7 (H) 0 - 5.6 % Final     Comment:     Normal <5.7% Prediabetes 5.7-6.4%  Diabetes 6.5% or higher - adopted from ADA   consensus guidelines.     04/03/2019 7.8 (H) 0 - 5.6 % Final     Comment:     Normal <5.7% Prediabetes 5.7-6.4%  Diabetes 6.5% or higher - adopted from ADA   consensus guidelines.     06/19/2018 7.3 (H) 0 - 5.6 % Final     Comment:     Normal <5.7% Prediabetes 5.7-6.4%  Diabetes 6.5% or higher - adopted from ADA   consensus guidelines.       BP Readings from Last 3 Encounters:   07/17/19 130/86   06/12/19 132/90   05/01/19 128/76         Hypertension Follow-up      Do you check your blood pressure regularly outside of the clinic? No     Are you following a low salt diet? Yes    Are your blood pressures ever more than 140 on the top number (systolic) OR more   than 90 on the bottom number (diastolic), for example 140/90? No  Heart Failure Follow-up  Are you experiencing any shortness of breath? Yes, with activity only    How would you describe your shortness of breath?  Same as usual        Are you experiencing any swelling in your legs or feet?  Stable    Are you using more pillows than usual? No    Do you cough at night?  No    Do you check your weight daily?  No    Have you had a weight change recently?  No    Are you having any of the following side effects from your medications? (Select all that apply)  Fatigue    Since your last visit, how many times have you gone to the cardiologist, urgent care, emergency room, or hospital because of your heart failure?   None      Amount of exercise or physical activity: 2-3 days/week for an average of less than 15 minutes    Problems taking medications regularly: No    Medication side effects: none    Diet: low salt, low fat/cholesterol and diabetic          Recent Labs   Lab Test 07/12/19  1131 04/03/19  0930  06/19/18  1103  12/06/17  1644 04/12/17  0740   A1C 6.7* 7.8*  --  7.3*  --  6.8* 7.0*   LDL  --  67  --   --   --  86 82   HDL  --  42  --   --   --  52 52   TRIG  --  85  --   --   --  108 85   ALT  --  31  --   --   --  36 35   CR 0.85 0.82   < > 0.82   < > 0.95 0.91   GFRESTIMATED >90 >90   < > >90   < > 79 84   GFRESTBLACK >90 >90   < > >90   < > >90 >90  African American GFR Calc     POTASSIUM 4.3 4.6   < > 4.6   < > 4.7 4.3   TSH  --  1.49  --   --   --  2.19  --     < > = values in this interval not displayed.      BP Readings from Last 3 Encounters:   07/17/19 130/86   06/12/19 132/90   05/01/19 128/76    Wt Readings from Last 3 Encounters:   07/17/19 (!) 169.2 kg (373 lb)   06/12/19 (!) 169 kg (372 lb 8 oz)   05/01/19 (!) 166.5 kg (367 lb)                      Reviewed and updated as needed this visit by Provider         Review of Systems   ROS COMP: CONSTITUTIONAL: NEGATIVE for fever, chills, change in weight  ENT/MOUTH: NEGATIVE for ear, mouth and throat problems  RESP: NEGATIVE for significant cough or SOB  CV: NEGATIVE for chest pain, palpitations or peripheral  "edema  MUSCULOSKELETAL: POSITIVE  for injury to Lt knee, Lt forearm  ENDOCRINE: NEGATIVE for temperature intolerance, skin/hair changes and POSITIVE  for HX diabetes      Objective    /86 (BP Location: Left arm, Patient Position: Sitting, Cuff Size: Adult Large)   Pulse 98   Temp 98.1  F (36.7  C) (Tympanic)   Resp 20   Wt (!) 169.2 kg (373 lb)   SpO2 99%   BMI 55.89 kg/m    Body mass index is 55.89 kg/m .  Physical Exam   GENERAL: healthy, alert and no distress  NECK: no adenopathy, no asymmetry, masses, or scars and thyroid normal to palpation  RESP: lungs clear to auscultation - no rales, rhonchi or wheezes  CV: regular rate and rhythm, normal S1 S2, no S3 or S4, no murmur, click or rub, no peripheral edema and peripheral pulses strong  ABDOMEN: soft, nontender, no hepatosplenomegaly, no masses and bowel sounds normal  MS: no gross musculoskeletal defects noted, no edema  SKIN: ecchymoses - arms and lower legs    Diagnostic Test Results:  Labs reviewed in Epic  none today, were done on 7/12/19, discussed with Pt        Assessment & Plan     Demetri was seen today for diabetes and recheck medication.    Diagnoses and all orders for this visit:    DM (diabetes mellitus) type II uncontrolled, periph vascular disorder (H)  -     Hemoglobin A1c; Future  -     Basic metabolic panel; Future  -     TSH with free T4 reflex; Future  -     Albumin Random Urine Quantitative with Creat Ratio; Future    Hypercholesterolemia  -     Lipid panel reflex to direct LDL Fasting; Future  -     ALT; Future  -     AST; Future    Chronic obstructive pulmonary disease, unspecified COPD type (H)    Essential hypertension, benign    Overweight BMI 50-55    Cardiomyopathy, unspecified type (H)         BMI:   Estimated body mass index is 55.89 kg/m  as calculated from the following:    Height as of 4/3/19: 1.74 m (5' 8.5\").    Weight as of this encounter: 169.2 kg (373 lb).   Weight management plan: Discussed healthy diet and " exercise guidelines        FUTURE APPOINTMENTS:       - Follow-up visit in 2 mo   Future lab orders placed  Need to get diabetic eye exam done, have report sent here  Work on weight loss  Patient Instructions   Watch diet, decrease carbohydrates.  Work on weight loss      Return in about 2 months (around 9/17/2019) for Diabetes, hypercholesterol.    Cheko Osborn MD  [facility]

## 2019-08-01 DIAGNOSIS — I48.91 ATRIAL FIBRILLATION WITH RVR (H): ICD-10-CM

## 2019-08-02 DIAGNOSIS — E78.00 HYPERCHOLESTEROLEMIA: ICD-10-CM

## 2019-08-02 RX ORDER — ATORVASTATIN CALCIUM 20 MG/1
TABLET, FILM COATED ORAL
Qty: 45 TABLET | Refills: 1 | Status: SHIPPED | OUTPATIENT
Start: 2019-08-02 | End: 2021-10-20

## 2019-08-02 NOTE — TELEPHONE ENCOUNTER
"Last OV 07/17/2019.  Requested Prescriptions   Pending Prescriptions Disp Refills     atorvastatin (LIPITOR) 20 MG tablet [Pharmacy Med Name: ATORVASTATIN 20 MG TABLET] 45 tablet 0     Sig: TAKE 1/2 TABLET BY MOUTH ONCE DAILY       Statins Protocol Passed - 8/2/2019 12:57 AM        Passed - LDL on file in past 12 months     Recent Labs   Lab Test 04/03/19  0930   LDL 67             Passed - No abnormal creatine kinase in past 12 months     No lab results found.             Passed - Recent (12 mo) or future (30 days) visit within the authorizing provider's specialty     Patient had office visit in the last 12 months or has a visit in the next 30 days with authorizing provider or within the authorizing provider's specialty.  See \"Patient Info\" tab in inbasket, or \"Choose Columns\" in Meds & Orders section of the refill encounter.              Passed - Medication is active on med list        Passed - Patient is age 18 or older        Prescription approved per [facility] Ref[facility] Protocol.    "

## 2019-08-28 DIAGNOSIS — I10 HYPERTENSION GOAL BP (BLOOD PRESSURE) < 130/80: ICD-10-CM

## 2019-08-28 RX ORDER — LISINOPRIL 5 MG/1
5 TABLET ORAL 2 TIMES DAILY
Qty: 180 TABLET | Refills: 3 | Status: SHIPPED | OUTPATIENT
Start: 2019-08-28 | End: 2020-12-09

## 2019-08-28 NOTE — TELEPHONE ENCOUNTER
"lisinopril (PRINIVIL/ZESTRIL) 5 MG tablet  Last Written Prescription Date: 6/24/19  Last Fill Quantity: 60,  # refills: 0   Last office visit: 7/17/2019 with prescribing provider: Dr. Osborn  Future Office Visit:   Next 5 appointments (look out 90 days)    Sep 18, 2019  9:00 AM CDT  SHORT with Cheko Osborn MD  [facility] [address] [place] [id number] [phone]         Requested Prescriptions   Pending Prescriptions Disp Refills     lisinopril (PRINIVIL/ZESTRIL) 5 MG tablet 60 tablet 0     Sig: Take 1 tablet (5 mg) by mouth 2 times daily       ACE Inhibitors (Including Combos) Protocol Passed - 8/28/2019 11:43 AM        Passed - Blood pressure under 140/90 in past 12 months     BP Readings from Last 3 Encounters:   07/17/19 130/86   06/12/19 132/90   05/01/19 128/76                 Passed - Recent (12 mo) or future (30 days) visit within the authorizing provider's specialty     Patient had office visit in the last 12 months or has a visit in the next 30 days with authorizing provider or within the authorizing provider's specialty.  See \"Patient Info\" tab in inbasket, or \"Choose Columns\" in Meds & Orders section of the refill encounter.              Passed - Medication is active on med list        Passed - Patient is age 18 or older        Passed - Normal serum creatinine on file in past 12 months     Recent Labs   Lab Test 07/12/19  1131   CR 0.85             Passed - Normal serum potassium on file in past 12 months     Recent Labs   Lab Test 07/12/19  1131   POTASSIUM 4.3             Prescription [place] per [facility] [place].    "

## 2019-08-28 NOTE — TELEPHONE ENCOUNTER
Reason for Call:  Medication or medication refill:    Do you use a [facility]?  Name of the pharmacy and phone number for the current request:  [id number] [place], [place] - [address] [place] [address]    Name of the medication requested: lisinopril (PRINIVIL/ZESTRIL) 5 MG tablet       Other request: The patients wife called to refill this medications and stated that he is close to being out so he will need this refilled as soon as possible.     Can we leave a detailed message on this number? YES    Phone number patient can be reached at: Cell number on file:    Telephone Information:   Mobile [phone]       Best Time: Any    Call taken on 8/28/2019 at 11:42 AM by Sophie Ibarra

## 2019-09-18 ENCOUNTER — OFFICE VISIT (OUTPATIENT)
Dept: FAMILY MEDICINE | Facility: CLINIC | Age: 64
End: 2019-09-18
Payer: COMMERCIAL

## 2019-09-18 VITALS
BODY MASS INDEX: 54.84 KG/M2 | DIASTOLIC BLOOD PRESSURE: 88 MMHG | OXYGEN SATURATION: 98 % | HEART RATE: 96 BPM | WEIGHT: 315 LBS | SYSTOLIC BLOOD PRESSURE: 136 MMHG | TEMPERATURE: 97.4 F

## 2019-09-18 DIAGNOSIS — E78.00 HYPERCHOLESTEROLEMIA: ICD-10-CM

## 2019-09-18 DIAGNOSIS — I48.21 PERMANENT ATRIAL FIBRILLATION (H): ICD-10-CM

## 2019-09-18 DIAGNOSIS — I10 ESSENTIAL HYPERTENSION, BENIGN: ICD-10-CM

## 2019-09-18 DIAGNOSIS — I42.9 CARDIOMYOPATHY, UNSPECIFIED TYPE (H): ICD-10-CM

## 2019-09-18 LAB
ALT SERPL W P-5'-P-CCNC: 33 U/L (ref 0–70)
ANION GAP SERPL CALCULATED.3IONS-SCNC: 6 MMOL/L (ref 3–14)
AST SERPL W P-5'-P-CCNC: 44 U/L (ref 0–45)
BUN SERPL-MCNC: 10 MG/DL (ref 7–30)
CALCIUM SERPL-MCNC: 9 MG/DL (ref 8.5–10.1)
CHLORIDE SERPL-SCNC: 100 MMOL/L (ref 94–109)
CHOLEST SERPL-MCNC: 134 MG/DL
CO2 SERPL-SCNC: 28 MMOL/L (ref 20–32)
CREAT SERPL-MCNC: 0.88 MG/DL (ref 0.66–1.25)
CREAT UR-MCNC: 81 MG/DL
GFR SERPL CREATININE-BSD FRML MDRD: 90 ML/MIN/{1.73_M2}
GLUCOSE SERPL-MCNC: 180 MG/DL (ref 70–99)
HBA1C MFR BLD: 6.8 % (ref 0–5.6)
HDLC SERPL-MCNC: 49 MG/DL
LDLC SERPL CALC-MCNC: 62 MG/DL
MICROALBUMIN UR-MCNC: 63 MG/L
MICROALBUMIN/CREAT UR: 78.18 MG/G CR (ref 0–17)
NONHDLC SERPL-MCNC: 85 MG/DL
POTASSIUM SERPL-SCNC: 4.1 MMOL/L (ref 3.4–5.3)
SODIUM SERPL-SCNC: 134 MMOL/L (ref 133–144)
TRIGL SERPL-MCNC: 117 MG/DL
TSH SERPL DL<=0.005 MIU/L-ACNC: 3.11 MU/L (ref 0.4–4)

## 2019-09-18 PROCEDURE — 84450 TRANSFERASE (AST) (SGOT): CPT | Performed by: FAMILY MEDICINE

## 2019-09-18 PROCEDURE — 80061 LIPID PANEL: CPT | Performed by: FAMILY MEDICINE

## 2019-09-18 PROCEDURE — 84460 ALANINE AMINO (ALT) (SGPT): CPT | Performed by: FAMILY MEDICINE

## 2019-09-18 PROCEDURE — 80048 BASIC METABOLIC PNL TOTAL CA: CPT | Performed by: FAMILY MEDICINE

## 2019-09-18 PROCEDURE — 82043 UR ALBUMIN QUANTITATIVE: CPT | Performed by: FAMILY MEDICINE

## 2019-09-18 PROCEDURE — 99214 OFFICE O/P EST MOD 30 MIN: CPT | Performed by: FAMILY MEDICINE

## 2019-09-18 PROCEDURE — 83036 HEMOGLOBIN GLYCOSYLATED A1C: CPT | Performed by: FAMILY MEDICINE

## 2019-09-18 PROCEDURE — 36415 COLL VENOUS BLD VENIPUNCTURE: CPT | Performed by: FAMILY MEDICINE

## 2019-09-18 PROCEDURE — 84443 ASSAY THYROID STIM HORMONE: CPT | Performed by: FAMILY MEDICINE

## 2019-09-18 NOTE — PROGRESS NOTES
Subjective     Demetri Booth is a 64 year old male who presents to clinic today for the following health issues:    HPI   Diabetes Follow-up      How often are you checking your blood sugar? Not at all    What time of day are you checking your blood sugars (select all that apply)?  Not at all    Have you had any blood sugars above 200?  No    Have you had any blood sugars below 70?  No    What symptoms do you notice when your blood sugar is low?  Shaky, Dizzy and Weak    What concerns do you have today about your diabetes? None     Do you have any of these symptoms? (Select all that apply)  Excessive thirst     Have you had a diabetic eye exam in the last 12 months? No    BP Readings from Last 2 Encounters:   09/18/19 136/88   07/17/19 130/86     Hemoglobin A1C (%)   Date Value   07/12/2019 6.7 (H)   04/03/2019 7.8 (H)     LDL Cholesterol Calculated (mg/dL)   Date Value   04/03/2019 67   12/06/2017 86       Diabetes Management Resources      How many servings of fruits and vegetables do you eat daily?  2-3    On average, how many sweetened beverages do you drink each day (soda, juice, sweet tea, etc)?   2    How many days per week do you miss taking your medication? 0    Hypertension Follow-up      Do you check your blood pressure regularly outside of the clinic? No     Are you following a low salt diet? Yes    Are your blood pressures ever more than 140 on the top number (systolic) OR more   than 90 on the bottom number (diastolic), for example 140/90? No  Heart Failure Follow-up  Are you experiencing any shortness of breath? Yes, with activity only   How would you describe your shortness of breath?  Same as usual        Are you experiencing any swelling in your legs or feet?  Stable    Are you using more pillows than usual? No    Do you cough at night?  No    Do you check your weight daily?  No    Have you had a weight change recently?  No    Are you having any of the following side effects from your medications?  (Select all that apply)  Fatigue    Since your last visit, how many times have you gone to the cardiologist, urgent care, emergency room, or hospital because of your heart failure?   None      Amount of exercise or physical activity: 2-3 days/week for an average of less than 15 minutes    Problems taking medications regularly: No    Medication side effects: none    Diet: low salt, low fat/cholesterol and diabetic          Recent Labs   Lab Test 07/12/19  1131 04/03/19  0930  06/19/18  1103  12/06/17  1644 04/12/17  0740   A1C 6.7* 7.8*  --  7.3*  --  6.8* 7.0*   LDL  --  67  --   --   --  86 82   HDL  --  42  --   --   --  52 52   TRIG  --  85  --   --   --  108 85   ALT  --  31  --   --   --  36 35   CR 0.85 0.82   < > 0.82   < > 0.95 0.91   GFRESTIMATED >90 >90   < > >90   < > 79 84   GFRESTBLACK >90 >90   < > >90   < > >90 >90  African American GFR Calc     POTASSIUM 4.3 4.6   < > 4.6   < > 4.7 4.3   TSH  --  1.49  --   --   --  2.19  --     < > = values in this interval not displayed.      BP Readings from Last 3 Encounters:   09/18/19 136/88   07/17/19 130/86   06/12/19 132/90    Wt Readings from Last 3 Encounters:   09/18/19 (!) 166 kg (366 lb)   07/17/19 (!) 169.2 kg (373 lb)   06/12/19 (!) 169 kg (372 lb 8 oz)                      Reviewed and updated as needed this visit by Provider         Review of Systems   ROS COMP: CONSTITUTIONAL: NEGATIVE for fever, chills, change in weight  ENT/MOUTH: NEGATIVE for ear, mouth and throat problems  RESP: NEGATIVE for significant cough or SOB  CV: NEGATIVE for chest pain, palpitations or peripheral edema  MUSCULOSKELETAL: POSITIVE  for injury to Lt knee, Lt forearm  ENDOCRINE: NEGATIVE for temperature intolerance, skin/hair changes and POSITIVE  for HX diabetes      Objective    /88 (BP Location: Right arm, Patient Position: Sitting, Cuff Size: Adult Large)   Pulse 96   Temp 97.4  F (36.3  C) (Tympanic)   Wt (!) 166 kg (366 lb)   SpO2 98%   BMI 54.84 kg/m   "  Body mass index is 54.84 kg/m .  Physical Exam   GENERAL: healthy, alert and no distress  NECK: no adenopathy, no asymmetry, masses, or scars and thyroid normal to palpation  RESP: lungs clear to auscultation - no rales, rhonchi or wheezes  CV: regular rate and rhythm, normal S1 S2, no S3 or S4, no murmur, click or rub, no peripheral edema and peripheral pulses strong  ABDOMEN: soft, nontender, no hepatosplenomegaly, no masses and bowel sounds normal  MS: no gross musculoskeletal defects noted, no edema  SKIN: ecchymoses - arms and lower legs    Diagnostic Test Results:  Labs reviewed in Epic  none today, were done on 7/12/19, discussed with Pt        Assessment & Plan     Demetri was seen today for diabetes.    Diagnoses and all orders for this visit:    DM (diabetes mellitus) type II uncontrolled, periph vascular disorder (H)  -     Hemoglobin A1c  -     Basic metabolic panel  -     TSH with free T4 reflex  -     Albumin Random Urine Quantitative with Creat Ratio    Essential hypertension, benign    Permanent atrial fibrillation (H)    Hypercholesterolemia  -     Lipid panel reflex to direct LDL Fasting  -     ALT  -     AST    Cardiomyopathy, unspecified type (H)         BMI:   Estimated body mass index is 55.89 kg/m  as calculated from the following:    Height as of 4/3/19: 1.74 m (5' 8.5\").    Weight as of this encounter: 169.2 kg (373 lb).   Weight management plan: Discussed healthy diet and exercise guidelines        FUTURE APPOINTMENTS:       - Follow-up visit in 3 mo   Future lab orders placed  Need to get diabetic eye exam done, have report sent here  Work on weight loss  Patient Instructions   Keep working on losing weight      Return in about 3 months (around 12/18/2019) for Diabetes, Hypertension.    Cheko Osborn MD  [facility]        "

## 2019-10-16 DIAGNOSIS — E11.9 TYPE 2 DIABETES MELLITUS WITHOUT COMPLICATION, WITHOUT LONG-TERM CURRENT USE OF INSULIN (H): ICD-10-CM

## 2019-10-16 NOTE — TELEPHONE ENCOUNTER
"Requested Prescriptions   Pending Prescriptions Disp Refills     metFORMIN (GLUCOPHAGE) 1000 MG tablet [Pharmacy Med Name: METFORMIN HCL 1,000 MG TABLET]  Last Written Prescription Date:  7/15/2019  Last Fill Quantity: 180 tablet,  # refills: 0   Last office visit: 9/18/2019 with prescribing provider:  Anurag   Future Office Visit:     180 tablet 0     Sig: TAKE 1 TABLET BY MOUTH TWICE A DAY WITH MEALS       Biguanide Agents Passed - 10/16/2019  1:29 AM        Passed - Blood pressure less than 140/90 in past 6 months     BP Readings from Last 3 Encounters:   09/18/19 136/88   07/17/19 130/86   06/12/19 132/90                 Passed - Patient has documented LDL within the past 12 mos.     Recent Labs   Lab Test 09/18/19 0914   LDL 62             Passed - Patient has had a Microalbumin in the past 15 mos.     Recent Labs   Lab Test 09/18/19 0914   MICROL 63   UMALCR 78.18*             Passed - Patient is age 10 or older        Passed - Patient has documented A1c within the specified period of time.     If HgbA1C is 8 or greater, it needs to be on file within the past 3 months.  If less than 8, must be on file within the past 6 months.     Recent Labs   Lab Test 09/18/19 0914   A1C 6.8*             Passed - Patient's CR is NOT>1.4 OR Patient's EGFR is NOT<45 within past 12 mos.     Recent Labs   Lab Test 09/18/19 0914   GFRESTIMATED 90   GFRESTBLACK >90       Recent Labs   Lab Test 09/18/19 0914   CR 0.88             Passed - Patient does NOT have a diagnosis of CHF.        Passed - Medication is active on med list        Passed - Recent (6 mo) or future (30 days) visit within the authorizing provider's specialty     Patient had office visit in the last 6 months or has a visit in the next 30 days with authorizing provider or within the authorizing provider's specialty.  See \"Patient Info\" tab in inbasket, or \"Choose Columns\" in Meds & Orders section of the refill encounter.               "

## 2019-10-18 NOTE — TELEPHONE ENCOUNTER
Prescription approved per [facility] Refill Protocol for 12 months of refills since last appointment, which was 9/18/2019.

## 2019-10-22 DIAGNOSIS — E11.9 TYPE 2 DIABETES MELLITUS WITHOUT COMPLICATION, WITHOUT LONG-TERM CURRENT USE OF INSULIN (H): ICD-10-CM

## 2019-10-23 RX ORDER — GLIMEPIRIDE 4 MG/1
TABLET ORAL
Qty: 90 TABLET | Refills: 0 | Status: SHIPPED | OUTPATIENT
Start: 2019-10-23 | End: 2021-11-08

## 2019-10-23 NOTE — TELEPHONE ENCOUNTER
"Requested Prescriptions   Pending Prescriptions Disp Refills     glimepiride (AMARYL) 4 MG tablet [Pharmacy Med Name: GLIMEPIRIDE 4 MG TABLET]  Last Written Prescription Date:  7/15/2019  Last Fill Quantity: 90 tablet,  # refills: 0   Last office visit: 9/18/2019 with prescribing provider:  Anurag   Future Office Visit:     90 tablet 0     Sig: TAKE 1 TABLET BY MOUTH EVERY DAY BEFORE BREAKFAST       Sulfonylurea Agents Passed - 10/22/2019  6:36 PM        Passed - Blood pressure less than 140/90 in past 6 months     BP Readings from Last 3 Encounters:   09/18/19 136/88   07/17/19 130/86   06/12/19 132/90                 Passed - Patient has documented LDL within the past 12 mos.     Recent Labs   Lab Test 09/18/19 0914   LDL 62             Passed - Patient has had a Microalbumin in the past 15 mos.     Recent Labs   Lab Test 09/18/19 0914   MICROL 63   UMALCR 78.18*             Passed - Patient has documented A1c within the specified period of time.     If HgbA1C is 8 or greater, it needs to be on file within the past 3 months.  If less than 8, must be on file within the past 6 months.     Recent Labs   Lab Test 09/18/19 0914   A1C 6.8*             Passed - Medication is active on med list        Passed - Patient is age 18 or older        Passed - Patient has a recent creatinine (normal) within the past 12 mos.     Recent Labs   Lab Test 09/18/19 0914   CR 0.88             Passed - Recent (6 mo) or future (30 days) visit within the authorizing provider's specialty     Patient had office visit in the last 6 months or has a visit in the next 30 days with authorizing provider or within the authorizing provider's specialty.  See \"Patient Info\" tab in inbasket, or \"Choose Columns\" in Meds & Orders section of the refill encounter.               "

## 2019-10-31 NOTE — TELEPHONE ENCOUNTER
"Requested Prescriptions   Pending Prescriptions Disp Refills     TELLYUVIA 100 MG tablet [Pharmacy Med Name: TELLYUVIA 100 MG TABLET]  Last Written Prescription Date:  5/1/2019  Last Fill Quantity: 90 tablet,  # refills: 1   Last office visit: 9/18/2019 with prescribing provider:  Anurag   Future Office Visit:     90 tablet 1     Sig: TAKE 1 TABLET BY MOUTH EVERY DAY       DPP4 Inhibitors Protocol Passed - 10/31/2019  4:23 AM        Passed - Blood pressure less than 140/90 in past 6 months     BP Readings from Last 3 Encounters:   09/18/19 136/88   07/17/19 130/86   06/12/19 132/90                 Passed - LDL on file in past 12 months     Recent Labs   Lab Test 09/18/19 0914   LDL 62             Passed - Microalbumin on file in past 12 months     Recent Labs   Lab Test 09/18/19 0914   MICROL 63   UMALCR 78.18*             Passed - HgbA1C in past 3 or 6 months     If HgbA1C is 8 or greater, it needs to be on file within the past 3 months.  If less than 8, must be on file within the past 6 months.     Recent Labs   Lab Test 09/18/19 0914   A1C 6.8*             Passed - Medication is active on med list        Passed - Patient is age 18 or older        Passed - Normal serum creatinine in past 12 months     Recent Labs   Lab Test 09/18/19  0914   CR 0.88             Passed - Recent (6 mo) or future (30 days) visit within the authorizing provider's specialty     Patient had office visit in the last 6 months or has a visit in the next 30 days with authorizing provider.  See \"Patient Info\" tab in inbasket, or \"Choose Columns\" in Meds & Orders section of the refill encounter.               "

## 2019-11-01 RX ORDER — SITAGLIPTIN 100 MG/1
TABLET, FILM COATED ORAL
Qty: 30 TABLET | Refills: 0 | Status: SHIPPED | OUTPATIENT
Start: 2019-11-01 | End: 2020-07-08

## 2020-02-26 ENCOUNTER — OFFICE VISIT (OUTPATIENT)
Dept: FAMILY MEDICINE | Facility: CLINIC | Age: 65
End: 2020-02-26
Payer: COMMERCIAL

## 2020-02-26 VITALS
TEMPERATURE: 97.7 F | HEART RATE: 75 BPM | OXYGEN SATURATION: 98 % | BODY MASS INDEX: 51.09 KG/M2 | WEIGHT: 315 LBS | DIASTOLIC BLOOD PRESSURE: 82 MMHG | SYSTOLIC BLOOD PRESSURE: 134 MMHG

## 2020-02-26 DIAGNOSIS — R10.84 ABDOMINAL PAIN, GENERALIZED: ICD-10-CM

## 2020-02-26 DIAGNOSIS — E66.01 MORBID OBESITY (H): ICD-10-CM

## 2020-02-26 DIAGNOSIS — E78.00 HYPERCHOLESTEROLEMIA: ICD-10-CM

## 2020-02-26 DIAGNOSIS — J44.9 CHRONIC OBSTRUCTIVE PULMONARY DISEASE, UNSPECIFIED COPD TYPE (H): ICD-10-CM

## 2020-02-26 DIAGNOSIS — I42.9 CARDIOMYOPATHY, UNSPECIFIED TYPE (H): ICD-10-CM

## 2020-02-26 DIAGNOSIS — I48.91 ATRIAL FIBRILLATION WITH RVR (H): ICD-10-CM

## 2020-02-26 LAB
ERYTHROCYTE [DISTWIDTH] IN BLOOD BY AUTOMATED COUNT: 13.2 % (ref 10–15)
ERYTHROCYTE [SEDIMENTATION RATE] IN BLOOD BY WESTERGREN METHOD: 17 MM/H (ref 0–20)
HBA1C MFR BLD: 10 % (ref 0–5.6)
HCT VFR BLD AUTO: 45.6 % (ref 40–53)
HGB BLD-MCNC: 15.8 G/DL (ref 13.3–17.7)
MCH RBC QN AUTO: 33.4 PG (ref 26.5–33)
MCHC RBC AUTO-ENTMCNC: 34.6 G/DL (ref 31.5–36.5)
MCV RBC AUTO: 96 FL (ref 78–100)
PLATELET # BLD AUTO: 142 10E9/L (ref 150–450)
RBC # BLD AUTO: 4.73 10E12/L (ref 4.4–5.9)
WBC # BLD AUTO: 4.4 10E9/L (ref 4–11)

## 2020-02-26 PROCEDURE — 82043 UR ALBUMIN QUANTITATIVE: CPT | Performed by: FAMILY MEDICINE

## 2020-02-26 PROCEDURE — 84443 ASSAY THYROID STIM HORMONE: CPT | Performed by: FAMILY MEDICINE

## 2020-02-26 PROCEDURE — 85652 RBC SED RATE AUTOMATED: CPT | Performed by: FAMILY MEDICINE

## 2020-02-26 PROCEDURE — 80061 LIPID PANEL: CPT | Performed by: FAMILY MEDICINE

## 2020-02-26 PROCEDURE — 85027 COMPLETE CBC AUTOMATED: CPT | Performed by: FAMILY MEDICINE

## 2020-02-26 PROCEDURE — 36415 COLL VENOUS BLD VENIPUNCTURE: CPT | Performed by: FAMILY MEDICINE

## 2020-02-26 PROCEDURE — 83036 HEMOGLOBIN GLYCOSYLATED A1C: CPT | Performed by: FAMILY MEDICINE

## 2020-02-26 PROCEDURE — 80053 COMPREHEN METABOLIC PANEL: CPT | Performed by: FAMILY MEDICINE

## 2020-02-26 PROCEDURE — 99214 OFFICE O/P EST MOD 30 MIN: CPT | Performed by: FAMILY MEDICINE

## 2020-02-26 NOTE — PROGRESS NOTES
Subjective     Demetri Booth is a 65 year old male who presents to clinic today for the following health issues:    HPI   Diabetes Follow-up      How often are you checking your blood sugar? Not at all    What concerns do you have today about your diabetes? None     Do you have any of these symptoms? (Select all that apply)  Numbness in feet, Burning in feet and Excessive thirst , feet discolored    Have you had a diabetic eye exam in the last 12 months? No        BP Readings from Last 2 Encounters:   02/26/20 134/82   09/18/19 136/88     Hemoglobin A1C (%)   Date Value   09/18/2019 6.8 (H)   07/12/2019 6.7 (H)     LDL Cholesterol Calculated (mg/dL)   Date Value   09/18/2019 62   04/03/2019 67                 How many servings of fruits and vegetables do you eat daily?  0-1    On average, how many sweetened beverages do you drink each day (Examples: soda, juice, sweet tea, etc.  Do NOT count diet or artificially sweetened beverages)?   2    How many days per week do you exercise enough to make your heart beat faster? 3 or less    How many minutes a day do you exercise enough to make your heart beat faster? 9 or less  How many days per week do you miss taking your medication? 4    What makes it hard for you to take your medications?  remembering to take    Diarrhea      Duration: 2-8-20 and again 2-20-20    Description:       Consistency of stool: watery       Blood in stool: no        Number of loose stools past 24 hours: 8    Intensity:  moderate    Accompanying signs and symptoms:       Fever: YES       Nausea/vomitting: YES       Abdominal pain: YES, felt full       Weight loss: no     History (recent antibiotics or travel/ill contacts/med changes/testing done): was in [place]    Precipitating or alleviating factors: rest, water    Therapies tried and outcome: Imodium     Pt had nausea and vomiting for a couple of days, felt hot like he was running fever    Pt has now lost 25 # since Nov    Hypertension  Follow-up      Do you check your blood pressure regularly outside of the clinic? No     Are you following a low salt diet? Yes    Are your blood pressures ever more than 140 on the top number (systolic) OR more   than 90 on the bottom number (diastolic), for example 140/90? No  Heart Failure Follow-up  Are you experiencing any shortness of breath? Yes, with activity only   How would you describe your shortness of breath?  Same as usual        Are you experiencing any swelling in your legs or feet?  Stable    Are you using more pillows than usual? No    Do you cough at night?  No    Do you check your weight daily?  No    Have you had a weight change recently?  No    Are you having any of the following side effects from your medications? (Select all that apply)  Fatigue    Since your last visit, how many times have you gone to the cardiologist, urgent care, emergency room, or hospital because of your heart failure?   None      Amount of exercise or physical activity: 2-3 days/week for an average of less than 15 minutes    Problems taking medications regularly: No    Medication side effects: none    Diet: low salt, low fat/cholesterol and diabetic          Recent Labs   Lab Test 09/18/19  0914 07/12/19  1131 04/03/19  0930  12/06/17  1644   A1C 6.8* 6.7* 7.8*   < > 6.8*   LDL 62  --  67  --  86   HDL 49  --  42  --  52   TRIG 117  --  85  --  108   ALT 33  --  31  --  36   CR 0.88 0.85 0.82   < > 0.95   GFRESTIMATED 90 >90 >90   < > 79   GFRESTBLACK >90 >90 >90   < > >90   POTASSIUM 4.1 4.3 4.6   < > 4.7   TSH 3.11  --  1.49  --  2.19    < > = values in this interval not displayed.      BP Readings from Last 3 Encounters:   02/26/20 134/82   09/18/19 136/88   07/17/19 130/86    Wt Readings from Last 3 Encounters:   02/26/20 (!) 154.7 kg (341 lb)   09/18/19 (!) 166 kg (366 lb)   07/17/19 (!) 169.2 kg (373 lb)                      Reviewed and updated as needed this visit by Provider         Review of Systems   ROS COMP:  CONSTITUTIONAL: NEGATIVE for fever, chills, change in weight  ENT/MOUTH: NEGATIVE for ear, mouth and throat problems  RESP: NEGATIVE for significant cough or SOB  CV: NEGATIVE for chest pain, palpitations or peripheral edema  MUSCULOSKELETAL: POSITIVE  for injury to Lt knee, Lt forearm  ENDOCRINE: NEGATIVE for temperature intolerance, skin/hair changes and POSITIVE  for HX diabetes      Objective    /82 (Cuff Size: Adult Large)   Pulse 75   Temp 97.7  F (36.5  C) (Tympanic)   Wt (!) 154.7 kg (341 lb)   SpO2 98%   BMI 51.09 kg/m    Body mass index is 51.09 kg/m .  Physical Exam   GENERAL: healthy, alert and no distress  NECK: no adenopathy, no asymmetry, masses, or scars and thyroid normal to palpation  RESP: lungs clear to auscultation - no rales, rhonchi or wheezes  CV: regular rate and rhythm, normal S1 S2, no S3 or S4, no murmur, click or rub, no peripheral edema and peripheral pulses strong  ABDOMEN: soft, nontender, no hepatosplenomegaly, no masses and bowel sounds normal  MS: no gross musculoskeletal defects noted, no edema  SKIN: ecchymoses - arms and lower legs    Diagnostic Test Results:  Labs reviewed in Epic  none today, were done on 7/12/19, discussed with Pt        Assessment & Plan     Demetri was seen today for diabetes and abdominal pain.    Diagnoses and all orders for this visit:    DM (diabetes mellitus) type II uncontrolled, periph vascular disorder (H)  -     Hemoglobin A1c  -     Comprehensive metabolic panel  -     Albumin Random Urine Quantitative with Creat Ratio  -     TSH with free T4 reflex    Abdominal pain, generalized  -     CBC with platelets  -     ESR: Erythrocyte sedimentation rate    Cardiomyopathy, unspecified type (H)    Chronic obstructive pulmonary disease, unspecified COPD type (H)    Morbid obesity (H)    Atrial fibrillation with RVR (H)    Hypercholesterolemia  -     Lipid panel reflex to direct LDL Fasting         BMI:   Estimated body mass index is 55.89 kg/m  as  "calculated from the following:    Height as of 4/3/19: 1.74 m (5' 8.5\").    Weight as of this encounter: 169.2 kg (373 lb).   Weight management plan: Discussed healthy diet and exercise guidelines        FUTURE APPOINTMENTS:       - Follow-up visit in 3 mo   Future lab orders placed  Need to get diabetic eye exam done, have report sent here  Work on weight loss  Patient Instructions   Keep pushing fluids, eat small amounts      Return in about 3 months (around 5/26/2020) for Diabetes, Hypertension.    Cheko Osborn MD  [facility]        "

## 2020-02-27 LAB
ALBUMIN SERPL-MCNC: 2.8 G/DL (ref 3.4–5)
ALP SERPL-CCNC: 138 U/L (ref 40–150)
ALT SERPL W P-5'-P-CCNC: 56 U/L (ref 0–70)
ANION GAP SERPL CALCULATED.3IONS-SCNC: 8 MMOL/L (ref 3–14)
AST SERPL W P-5'-P-CCNC: 67 U/L (ref 0–45)
BILIRUB SERPL-MCNC: 1.7 MG/DL (ref 0.2–1.3)
BUN SERPL-MCNC: 10 MG/DL (ref 7–30)
CALCIUM SERPL-MCNC: 8.4 MG/DL (ref 8.5–10.1)
CHLORIDE SERPL-SCNC: 99 MMOL/L (ref 94–109)
CHOLEST SERPL-MCNC: 111 MG/DL
CO2 SERPL-SCNC: 25 MMOL/L (ref 20–32)
CREAT SERPL-MCNC: 0.79 MG/DL (ref 0.66–1.25)
CREAT UR-MCNC: 102 MG/DL
GFR SERPL CREATININE-BSD FRML MDRD: >90 ML/MIN/{1.73_M2}
GLUCOSE SERPL-MCNC: 195 MG/DL (ref 70–99)
HDLC SERPL-MCNC: 28 MG/DL
LDLC SERPL CALC-MCNC: 66 MG/DL
MICROALBUMIN UR-MCNC: 6 MG/L
MICROALBUMIN/CREAT UR: 5.87 MG/G CR (ref 0–17)
NONHDLC SERPL-MCNC: 83 MG/DL
POTASSIUM SERPL-SCNC: 4.1 MMOL/L (ref 3.4–5.3)
PROT SERPL-MCNC: 7.4 G/DL (ref 6.8–8.8)
SODIUM SERPL-SCNC: 132 MMOL/L (ref 133–144)
TRIGL SERPL-MCNC: 87 MG/DL
TSH SERPL DL<=0.005 MIU/L-ACNC: 1.98 MU/L (ref 0.4–4)

## 2020-05-15 DIAGNOSIS — E11.9 TYPE 2 DIABETES MELLITUS WITHOUT COMPLICATION, WITHOUT LONG-TERM CURRENT USE OF INSULIN (H): ICD-10-CM

## 2020-05-15 NOTE — TELEPHONE ENCOUNTER
Virtual Appointment needed for patient (for diabetes and hypertension follow-up).  Please call patient 2 times in attempt to schedule an appointment for ASAP.    If appointment scheduled, please check with patient to see if they have enough medication to get them to appointment.  Please route back to triage with the above information.    If unable to get a hold of patient, please route to the provider.

## 2020-05-18 ENCOUNTER — VIRTUAL VISIT (OUTPATIENT)
Dept: FAMILY MEDICINE | Facility: CLINIC | Age: 65
End: 2020-05-18
Payer: COMMERCIAL

## 2020-05-18 DIAGNOSIS — E78.00 HYPERCHOLESTEROLEMIA: ICD-10-CM

## 2020-05-18 PROCEDURE — 99214 OFFICE O/P EST MOD 30 MIN: CPT | Mod: 95 | Performed by: FAMILY MEDICINE

## 2020-05-18 NOTE — PROGRESS NOTES
"Demetri Booth is a 65 year old male who is being evaluated via a billable video visit.      The patient has been notified of following:     \"This video visit will be conducted via a call between you and your physician/provider. We have found that certain health care needs can be provided without the need for an in-person physical exam.  This service lets us provide the care you need with a video conversation.  If a prescription is necessary we can send it directly to your pharmacy.  If lab work is needed we can place an order for that and you can then stop by our lab to have the test done at a later time.    Video visits are billed at different rates depending on your insurance coverage.  Please reach out to your insurance provider with any questions.    If during the course of the call the physician/provider feels a video visit is not appropriate, you will not be charged for this service.\"    Patient has given verbal consent for Video visit? Yes    How would you like to obtain your AVS? PhilipRussell    Patient would like the video invitation sent by: Send to e-mail at: [email or web address]    Will anyone else be joining your video visit? No    Subjective     Demetri Booth is a 65 year old male who presents today via video visit for the following health issues:    HPI  Diabetes Follow-up      How often are you checking your blood sugar? Not at all    What concerns do you have today about your diabetes? None     Do you have any of these symptoms? (Select all that apply)  Numbness in feet    Have you had a diabetic eye exam in the last 12 months? No        BP Readings from Last 2 Encounters:   02/26/20 134/82   09/18/19 136/88     Hemoglobin A1C (%)   Date Value   02/26/2020 10.0 (H)   09/18/2019 6.8 (H)     LDL Cholesterol Calculated (mg/dL)   Date Value   02/26/2020 66   09/18/2019 62               Hypertension Follow-up      Do you check your blood pressure regularly outside of the clinic? No     Are you following a low " "salt diet? Yes    Are your blood pressures ever more than 140 on the top number (systolic) OR more   than 90 on the bottom number (diastolic), for example 140/90? No, not checking      How many servings of fruits and vegetables do you eat daily?  0-1    On average, how many sweetened beverages do you drink each day (Examples: soda, juice, sweet tea, etc.  Do NOT count diet or artificially sweetened beverages)?   1    How many days per week do you exercise enough to make your heart beat faster? 3 or less    How many minutes a day do you exercise enough to make your heart beat faster? 9 or less  How many days per week do you miss taking your medication? 1    What makes it hard for you to take your medications?  remembering to take         Video Start Time: 1:26 PM  Pt not able to get video connection        No Known Allergies  Recent Labs   Lab Test 02/26/20  1032 09/18/19  0914 07/12/19  1131 04/03/19  0930   A1C 10.0* 6.8* 6.7* 7.8*   LDL 66 62  --  67   HDL 28* 49  --  42   TRIG 87 117  --  85   ALT 56 33  --  31   CR 0.79 0.88 0.85 0.82   GFRESTIMATED >90 90 >90 >90   GFRESTBLACK >90 >90 >90 >90   POTASSIUM 4.1 4.1 4.3 4.6   TSH 1.98 3.11  --  1.49        Reviewed and updated as needed this visit by Provider         Review of Systems   CONSTITUTIONAL: NEGATIVE for fever, chills, change in weight  ENT/MOUTH: NEGATIVE for ear, mouth and throat problems  RESP: NEGATIVE for significant cough or SOB  CV: NEGATIVE for chest pain, palpitations or peripheral edema  ENDOCRINE: NEGATIVE for temperature intolerance, skin/hair changes and POSITIVE  for HX diabetes      Objective    There were no vitals taken for this visit.  Estimated body mass index is 51.09 kg/m  as calculated from the following:    Height as of 4/3/19: 1.74 m (5' 8.5\").    Weight as of 2/26/20: 154.7 kg (341 lb).  Physical Exam     GENERAL: Healthy, alert and no distress  EYES: Eyes grossly normal to inspection.  No discharge or erythema, or obvious " scleral/conjunctival abnormalities.  RESP: No audible wheeze, cough, or visible cyanosis.  No visible retractions or increased work of breathing.    SKIN: Visible skin clear. No significant rash, abnormal pigmentation or lesions.  NEURO: Cranial nerves grossly intact.  Mentation and speech appropriate for age.  PSYCH: Mentation appears normal, affect normal/bright, judgement and insight intact, normal speech and appearance well-groomed.      Diagnostic Test Results:  Labs reviewed in Epic  Future orders placed        Assessment & Plan     Demetri was seen today for diabetes and hypertension.    Diagnoses and all orders for this visit:    DM (diabetes mellitus) type II uncontrolled, periph vascular disorder (H)  -     Hemoglobin A1c; Future  -     Basic metabolic panel; Future  -     TSH with free T4 reflex; Future  -     Albumin Random Urine Quantitative with Creat Ratio; Future    Hypercholesterolemia  -     Lipid panel reflex to direct LDL Fasting; Future  -     ALT; Future  -     AST; Future           FUTURE APPOINTMENTS:       - Follow-up visit in 3 mo   Patient Instructions   Need to work on diet, limit carbohydrates.  Increase doing some exercise      Return in about 3 months (around 8/18/2020) for Diabetes, hypercholesterol.    Cheko Osborn MD  [facility]      Video-Visit Details    Type of service:  Video Visit/Telephone    Video End Time:1:38 PM  Total telephone time 12 min     Originating Location (pt. Location): Home    Distant Location (provider location):  [facility]     Platform used for Video Visit: Gunjan   Pt not able to get video connection    Return in about 3 months (around 8/18/2020) for Diabetes, hypercholesterol.       Cheko Osborn MD

## 2020-05-20 DIAGNOSIS — E78.00 HYPERCHOLESTEROLEMIA: ICD-10-CM

## 2020-05-20 LAB — HBA1C MFR BLD: 7.5 % (ref 0–5.6)

## 2020-05-20 PROCEDURE — 36415 COLL VENOUS BLD VENIPUNCTURE: CPT | Performed by: FAMILY MEDICINE

## 2020-05-20 PROCEDURE — 84460 ALANINE AMINO (ALT) (SGPT): CPT | Performed by: FAMILY MEDICINE

## 2020-05-20 PROCEDURE — 84450 TRANSFERASE (AST) (SGOT): CPT | Performed by: FAMILY MEDICINE

## 2020-05-20 PROCEDURE — 80048 BASIC METABOLIC PNL TOTAL CA: CPT | Performed by: FAMILY MEDICINE

## 2020-05-20 PROCEDURE — 84443 ASSAY THYROID STIM HORMONE: CPT | Performed by: FAMILY MEDICINE

## 2020-05-20 PROCEDURE — 82043 UR ALBUMIN QUANTITATIVE: CPT | Performed by: FAMILY MEDICINE

## 2020-05-20 PROCEDURE — 80061 LIPID PANEL: CPT | Performed by: FAMILY MEDICINE

## 2020-05-20 PROCEDURE — 83036 HEMOGLOBIN GLYCOSYLATED A1C: CPT | Performed by: FAMILY MEDICINE

## 2020-05-21 LAB
ALT SERPL W P-5'-P-CCNC: 43 U/L (ref 0–70)
ANION GAP SERPL CALCULATED.3IONS-SCNC: 8 MMOL/L (ref 3–14)
AST SERPL W P-5'-P-CCNC: 61 U/L (ref 0–45)
BUN SERPL-MCNC: 8 MG/DL (ref 7–30)
CALCIUM SERPL-MCNC: 8.8 MG/DL (ref 8.5–10.1)
CHLORIDE SERPL-SCNC: 101 MMOL/L (ref 94–109)
CHOLEST SERPL-MCNC: 142 MG/DL
CO2 SERPL-SCNC: 28 MMOL/L (ref 20–32)
CREAT SERPL-MCNC: 0.81 MG/DL (ref 0.66–1.25)
CREAT UR-MCNC: 61 MG/DL
GFR SERPL CREATININE-BSD FRML MDRD: >90 ML/MIN/{1.73_M2}
GLUCOSE SERPL-MCNC: 172 MG/DL (ref 70–99)
HDLC SERPL-MCNC: 51 MG/DL
LDLC SERPL CALC-MCNC: 69 MG/DL
MICROALBUMIN UR-MCNC: 52 MG/L
MICROALBUMIN/CREAT UR: 84.01 MG/G CR (ref 0–17)
NONHDLC SERPL-MCNC: 91 MG/DL
POTASSIUM SERPL-SCNC: 4.5 MMOL/L (ref 3.4–5.3)
SODIUM SERPL-SCNC: 137 MMOL/L (ref 133–144)
TRIGL SERPL-MCNC: 111 MG/DL
TSH SERPL DL<=0.005 MIU/L-ACNC: 1.9 MU/L (ref 0.4–4)

## 2020-09-11 ENCOUNTER — TELEPHONE (OUTPATIENT)
Dept: FAMILY MEDICINE | Facility: CLINIC | Age: 65
End: 2020-09-11

## 2020-09-11 NOTE — TELEPHONE ENCOUNTER
Scheduled appt for 09/17.  Will go to ED if suddenly worsening before then.    No further action needed at this time.

## 2020-09-11 NOTE — TELEPHONE ENCOUNTER
Reason for Call:  Other symptoms    Detailed comments: The patients wife called and stated that her  has been dealing with a swollen penis and foreskin on and off for about a month. She is wondering if she should make him an appointment with Dr. Osborn of if he would want him to see a specialist. She would like a call back to discuss what the best course of action would be.     Phone Number Patient can be reached at: Cell number on file:    Telephone Information:   Mobile [phone]       Best Time: Any    Can we leave a detailed message on this number? YES    Call taken on 9/11/2020 at 2:00 PM by Sophie Ibarra

## 2020-09-17 ENCOUNTER — OFFICE VISIT (OUTPATIENT)
Dept: FAMILY MEDICINE | Facility: CLINIC | Age: 65
End: 2020-09-17
Payer: COMMERCIAL

## 2020-09-17 VITALS
DIASTOLIC BLOOD PRESSURE: 86 MMHG | HEIGHT: 69 IN | SYSTOLIC BLOOD PRESSURE: 126 MMHG | WEIGHT: 315 LBS | OXYGEN SATURATION: 96 % | HEART RATE: 90 BPM | RESPIRATION RATE: 20 BRPM | BODY MASS INDEX: 46.65 KG/M2 | TEMPERATURE: 97.2 F

## 2020-09-17 DIAGNOSIS — I50.42 CHRONIC COMBINED SYSTOLIC AND DIASTOLIC CONGESTIVE HEART FAILURE (H): ICD-10-CM

## 2020-09-17 DIAGNOSIS — I42.9 CARDIOMYOPATHY, UNSPECIFIED TYPE (H): ICD-10-CM

## 2020-09-17 DIAGNOSIS — N50.89 SCROTAL EDEMA: Primary | ICD-10-CM

## 2020-09-17 LAB — HBA1C MFR BLD: 7.8 % (ref 0–5.6)

## 2020-09-17 PROCEDURE — 80048 BASIC METABOLIC PNL TOTAL CA: CPT | Performed by: FAMILY MEDICINE

## 2020-09-17 PROCEDURE — 83036 HEMOGLOBIN GLYCOSYLATED A1C: CPT | Performed by: FAMILY MEDICINE

## 2020-09-17 PROCEDURE — 83880 ASSAY OF NATRIURETIC PEPTIDE: CPT | Performed by: FAMILY MEDICINE

## 2020-09-17 PROCEDURE — 36415 COLL VENOUS BLD VENIPUNCTURE: CPT | Performed by: FAMILY MEDICINE

## 2020-09-17 PROCEDURE — 99214 OFFICE O/P EST MOD 30 MIN: CPT | Performed by: FAMILY MEDICINE

## 2020-09-17 RX ORDER — FUROSEMIDE 40 MG
40 TABLET ORAL DAILY
Qty: 90 TABLET | Refills: 0 | Status: SHIPPED | OUTPATIENT
Start: 2020-09-17 | End: 2020-12-11

## 2020-09-17 ASSESSMENT — MIFFLIN-ST. JEOR: SCORE: 2399.93

## 2020-09-17 NOTE — PROGRESS NOTES
Subjective     Demetri Booth is a 65 year old male who presents to clinic today for the following health issues:    Pt here with his wife    HPI     Concern - Penis Problem   Onset: x3 months   Description: swollen/puffy foreskin that can last for x2 days, some itching  Intensity: mild  Progression of Symptoms:  same and intermittent  Accompanying Signs & Symptoms: no redness or discharge, no pain or tenderness, loose/watery stool around the same time this issue first appeared  Previous history of similar problem: none   Precipitating factors:        Worsened by: sedentary lifestyle    Alleviating factors:        Improved by: eating more fruit   Therapies tried and outcome: cleans twice a day, acetaminophen - not helpful     Pt wears compression stockings daily.  He notes that swelling in legs is always present    Diabetes Follow-up    How often are you checking your blood sugar? One time daily  What time of day are you checking your blood sugars (select all that apply)?  Before meals  Have you had any blood sugars above 200?  No  Have you had any blood sugars below 70?  No    What symptoms do you notice when your blood sugar is low?  None    What concerns do you have today about your diabetes? None     Do you have any of these symptoms? (Select all that apply)  Numbness in feet    Have you had a diabetic eye exam in the last 12 months? No        BP Readings from Last 2 Encounters:   09/17/20 126/86   02/26/20 134/82     Hemoglobin A1C (%)   Date Value   05/20/2020 7.5 (H)   02/26/2020 10.0 (H)     LDL Cholesterol Calculated (mg/dL)   Date Value   05/20/2020 69   02/26/2020 66           How many servings of fruits and vegetables do you eat daily?  2-3    On average, how many sweetened beverages do you drink each day (Examples: soda, juice, sweet tea, etc.  Do NOT count diet or artificially sweetened beverages)?   0    How many days per week do you exercise enough to make your heart beat faster? 3 or less    How many  "minutes a day do you exercise enough to make your heart beat faster? 9 or less    How many days per week do you miss taking your medication? 0      Review of Systems   CONSTITUTIONAL: NEGATIVE for fever, chills, change in weight  ENT/MOUTH: NEGATIVE for ear, mouth and throat problems  RESP: NEGATIVE for significant cough or SOB  CV: POSITIVE for lower extremity edema and NEGATIVE for chest pain/chest pressure  : negative for dysuria, hematuria, decreased urinary stream, erectile dysfunction      Objective    /86 (Patient Position: Sitting, Cuff Size: Adult Large)   Pulse 90   Temp 97.2  F (36.2  C) (Tympanic)   Resp 20   Ht 1.74 m (5' 8.5\")   Wt (!) 163.7 kg (361 lb)   SpO2 96%   BMI 54.09 kg/m    Body mass index is 54.09 kg/m .  Physical Exam   GENERAL: healthy, alert and no distress  NECK: no adenopathy, no asymmetry, masses, or scars and thyroid normal to palpation  RESP: lungs clear to auscultation - no rales, rhonchi or wheezes  CV: regular rates and rhythm, normal S1 S2, no S3 or S4, no murmur, click or rub, peripheral pulses strong and 3+ bilateral lower extremity pitting edema to thighs    ABDOMEN: soft, nontender, without hepatosplenomegaly or masses, bowel sounds normal and edema evident in lower abdominal wall area   (male): testicles normal without atrophy or masses, no hernias, penis normal without urethral discharge and scrotal and foreskin edema present  MS: extremities normal- no gross deformities noted    Lab: see below, results pending        Assessment & Plan     Demetri was seen today for penis/scrotum problem.    Diagnoses and all orders for this visit:    Scrotal edema  -     furosemide (LASIX) 40 MG tablet; Take 1 tablet (40 mg) by mouth daily    Cardiomyopathy, unspecified type (H)  -     furosemide (LASIX) 40 MG tablet; Take 1 tablet (40 mg) by mouth daily  -     BNP-N terminal pro    Chronic combined systolic and diastolic congestive heart failure (H)  -     BNP-N terminal " "pro    DM (diabetes mellitus) type II uncontrolled, periph vascular disorder (H)  -     Hemoglobin A1c  -     Basic metabolic panel         BMI:   Estimated body mass index is 54.09 kg/m  as calculated from the following:    Height as of this encounter: 1.74 m (5' 8.5\").    Weight as of this encounter: 163.7 kg (361 lb).   Weight management plan: Discussed healthy diet and exercise guidelines        FUTURE APPOINTMENTS:       - Follow-up visit in 3 mo   Patient Instructions   Keep working on diet, work to lose weight      Return in about 3 months (around 12/17/2020) for scrotal edema, dependent edema, Diabetes.    Cheko Osborn MD  [facility]    "

## 2020-09-18 LAB
ANION GAP SERPL CALCULATED.3IONS-SCNC: 7 MMOL/L (ref 3–14)
BUN SERPL-MCNC: 8 MG/DL (ref 7–30)
CALCIUM SERPL-MCNC: 9.1 MG/DL (ref 8.5–10.1)
CHLORIDE SERPL-SCNC: 102 MMOL/L (ref 94–109)
CO2 SERPL-SCNC: 26 MMOL/L (ref 20–32)
CREAT SERPL-MCNC: 0.8 MG/DL (ref 0.66–1.25)
GFR SERPL CREATININE-BSD FRML MDRD: >90 ML/MIN/{1.73_M2}
GLUCOSE SERPL-MCNC: 161 MG/DL (ref 70–99)
NT-PROBNP SERPL-MCNC: 317 PG/ML (ref 0–125)
POTASSIUM SERPL-SCNC: 4.2 MMOL/L (ref 3.4–5.3)
SODIUM SERPL-SCNC: 135 MMOL/L (ref 133–144)

## 2020-09-21 DIAGNOSIS — I48.91 ATRIAL FIBRILLATION WITH RVR (H): ICD-10-CM

## 2020-11-22 ENCOUNTER — HEALTH MAINTENANCE LETTER (OUTPATIENT)
Age: 65
End: 2020-11-22

## 2020-12-09 DIAGNOSIS — I10 HYPERTENSION GOAL BP (BLOOD PRESSURE) < 130/80: ICD-10-CM

## 2020-12-09 DIAGNOSIS — E11.9 TYPE 2 DIABETES MELLITUS WITHOUT COMPLICATION, WITHOUT LONG-TERM CURRENT USE OF INSULIN (H): ICD-10-CM

## 2020-12-09 RX ORDER — LISINOPRIL 5 MG/1
TABLET ORAL
Qty: 180 TABLET | Refills: 1 | Status: SHIPPED | OUTPATIENT
Start: 2020-12-09 | End: 2021-06-11

## 2020-12-09 NOTE — TELEPHONE ENCOUNTER
Prescription approved per [facility] Refill Protocol.    Marcia WOODARDN, RN, PHN

## 2020-12-11 DIAGNOSIS — N50.89 SCROTAL EDEMA: ICD-10-CM

## 2020-12-11 DIAGNOSIS — I42.9 CARDIOMYOPATHY, UNSPECIFIED TYPE (H): ICD-10-CM

## 2020-12-11 RX ORDER — FUROSEMIDE 40 MG
40 TABLET ORAL DAILY
Qty: 90 TABLET | Refills: 0 | Status: SHIPPED | OUTPATIENT
Start: 2020-12-11 | End: 2020-12-16

## 2020-12-15 DIAGNOSIS — I10 HYPERTENSION GOAL BP (BLOOD PRESSURE) < 130/80: ICD-10-CM

## 2020-12-15 RX ORDER — METOPROLOL TARTRATE 100 MG
150 TABLET ORAL 2 TIMES DAILY
Qty: 270 TABLET | Refills: 0 | Status: SHIPPED | OUTPATIENT
Start: 2020-12-15 | End: 2021-01-13

## 2020-12-16 DIAGNOSIS — N50.89 SCROTAL EDEMA: ICD-10-CM

## 2020-12-16 DIAGNOSIS — I42.9 CARDIOMYOPATHY, UNSPECIFIED TYPE (H): ICD-10-CM

## 2020-12-16 RX ORDER — FUROSEMIDE 40 MG
TABLET ORAL
Qty: 90 TABLET | Refills: 2 | Status: SHIPPED | OUTPATIENT
Start: 2020-12-16 | End: 2021-03-09

## 2021-01-13 ENCOUNTER — OFFICE VISIT (OUTPATIENT)
Dept: CARDIOLOGY | Facility: CLINIC | Age: 66
End: 2021-01-13
Payer: COMMERCIAL

## 2021-01-13 VITALS
WEIGHT: 315 LBS | HEART RATE: 99 BPM | DIASTOLIC BLOOD PRESSURE: 85 MMHG | SYSTOLIC BLOOD PRESSURE: 135 MMHG | BODY MASS INDEX: 46.65 KG/M2 | HEIGHT: 69 IN

## 2021-01-13 DIAGNOSIS — I10 HYPERTENSION GOAL BP (BLOOD PRESSURE) < 130/80: ICD-10-CM

## 2021-01-13 DIAGNOSIS — I48.21 PERMANENT ATRIAL FIBRILLATION (H): Primary | ICD-10-CM

## 2021-01-13 DIAGNOSIS — E66.813 CLASS 3 SEVERE OBESITY DUE TO EXCESS CALORIES WITH BODY MASS INDEX (BMI) OF 50.0 TO 59.9 IN ADULT, UNSPECIFIED WHETHER SERIOUS COMORBIDITY PRESENT (H): ICD-10-CM

## 2021-01-13 DIAGNOSIS — E66.01 CLASS 3 SEVERE OBESITY DUE TO EXCESS CALORIES WITH BODY MASS INDEX (BMI) OF 50.0 TO 59.9 IN ADULT, UNSPECIFIED WHETHER SERIOUS COMORBIDITY PRESENT (H): ICD-10-CM

## 2021-01-13 PROCEDURE — 93000 ELECTROCARDIOGRAM COMPLETE: CPT | Performed by: NURSE PRACTITIONER

## 2021-01-13 PROCEDURE — 99214 OFFICE O/P EST MOD 30 MIN: CPT | Performed by: NURSE PRACTITIONER

## 2021-01-13 RX ORDER — METOPROLOL TARTRATE 100 MG
100 TABLET ORAL 3 TIMES DAILY
Qty: 270 TABLET | Refills: 3 | Status: SHIPPED | OUTPATIENT
Start: 2021-01-13 | End: 2022-02-11

## 2021-01-13 ASSESSMENT — MIFFLIN-ST. JEOR: SCORE: 2407.87

## 2021-01-13 NOTE — PROGRESS NOTES
HPI:  Demetri Booth is a 65 year old male who presents to clinic today for annual cardiology visit.  He is a patient of Dr. Bird and has a medical history of     1. Permanent atrial fibrillation.  Treated with rate control  2. Severe obesity  3. Obstructive sleep apnea.  On CPAP  4. Diabetes type 2  5. Hypertension    Diagnostics:    Coronary catheterization (2018) revealed no significant coronary artery disease.    In 2018, patient was seen in the clinic with hypertension and mild chest pain/tightness on with gradual onset when climbing the stairs.  When he exercised the pain would radiate to his neck and go away when he stopped exercising.   Amlodipine was started for his blood pressure which did not help his chest discomfort and a coronary angiogram was recommended which revealed no significant coronary artery disease.    In April 2019, patient was seen by Dr. Bird and had decreased his metoprolol as he intermittently had dizziness.  Dr. Bird been recommended to take 150 mg in the morning and 100 mg in the evening.    In September 2, 2020, patient had complaints of scrotal edema and was started on Lasix 40 mg daily his BMP was normal    Today he reports a continued problem with dizziness if he takes all his medications at one time he feels like he is going to fall over.  Now he takes his medications throughout the day.  He states he has scrotal edema and when he takes his lasix it improves the edema but he doesn't take the lasix daily.   Overall he denies angina, dyspnea at rest or with exertion,  palpitations, orthopnea, PND. He takes his Eliquis and denies bleeding, hematuria, hematochezia, epistaxis and signs/symptoms of stroke.       ASSESSMENT AND PLAN    Permanent Atrial fibrillation    For rate control he is taking metoprolol  mg twice daily.  Over the years, he has complained of dizziness and he decreases his metoprolol.  He is suppose to be taking 250 mg daily.  Today ECG, which was over read by  me, revealed atrial fibrillation with ventricular rate of 104 bpm.  Will increase metoprolol  mg to three times daily    For anticoagulation for CHADS VASC 2 (HTN, age) he is currently taking Eliquis 5 mg twice daily.  Last hgb was 15.8 (2/2020)    Hypertension    Slightly elevated despite taking metoprolol  mg daily, lisinopril 5 mg daily, and amlodipine.      Will increase metoprolol XL to 100 mg TID    Obesity    BMI 53.    Weight loss encouraged and increase activity encouraged    Plan:  Increase metoprolol  mg BID to TID   Follow up with Dr. Bird in 1 year  Call if problems arise sooner    32 minutes spent on the date of the encounter doing chart review, review of test results, interpretation of tests, patient visit, documentation    Patient expresses understanding and agreement with the plan.     I appreciate the chance to help with Demetri Booth Please let me know if you have any questions or concerns.    Luz Maria Miranda, APRN, CNP    This note was completed in part using Dragon voice recognition software. Although reviewed after completion, some word and grammatical errors may occur.    Orders Placed This Encounter   Procedures     EKG 12-lead complete w/read - Clinics (performed today)     No orders of the defined types were placed in this encounter.    There are no discontinued medications.      Encounter Diagnosis   Name Primary?     Atrial fibrillation with RVR Yes       CURRENT MEDICATIONS:  Current Outpatient Medications   Medication Sig Dispense Refill     Acetaminophen (TYLENOL PO) Take 500 mg by mouth every 4 hours as needed        alcohol swab prep pads Use to swab area of injection/janessa as directed. 200 each 3     amLODIPine (NORVASC) 5 MG tablet Take 1 tablet (5 mg) by mouth daily 90 tablet 1     apixaban ANTICOAGULANT (ELIQUIS) 5 MG tablet Take 1 tablet (5 mg) by mouth 2 times daily 180 tablet 0     atorvastatin (LIPITOR) 20 MG tablet TAKE 1/2 TABLET BY MOUTH ONCE DAILY 45  tablet 1     clobetasol (TEMOVATE) 0.05 % external cream APPLY TOPICALLY TWICE DAILY AS NEEDED FOR UP TO TWO WEEKS, THEN INTERMITTENLY. 30 g 3     furosemide (LASIX) 40 MG tablet TAKE 1 TABLET BY MOUTH EVERY DAY 90 tablet 2     glimepiride (AMARYL) 4 MG tablet TAKE 1 TABLET BY MOUTH EVERY DAY BEFORE BREAKFAST (Patient taking differently: 2 mg TAKE 1 TABLET BY MOUTH EVERY DAY BEFORE BREAKFAST) 90 tablet 0     hydrocortisone 1 % ointment Apply sparingly to affected area. 30 g 0     lisinopril (ZESTRIL) 5 MG tablet TAKE 1 TABLET BY MOUTH TWICE A  tablet 1     metFORMIN (GLUCOPHAGE) 1000 MG tablet TAKE 1 TABLET BY MOUTH TWICE A DAY WITH MEALS 180 tablet 0     metoprolol tartrate (LOPRESSOR) 100 MG tablet Take 1.5 tablets (150 mg) by mouth 2 times daily 270 tablet 0     multivitamin, therapeutic with minerals (THERA-VIT-M) TABS Take 1 tablet by mouth daily.       order for DME Equipment being ordered: CPAP 1 Device 0     order for DME Equipment being ordered: JOBST compression stockings knee high 20-30 mm compression 2 Device 1     order for DME CPAP every night   Needs small travel sized CPAP machine 1 Device 0     sitagliptin (JANUVIA) 100 MG tablet Take 1 tablet (100 mg) by mouth daily 90 tablet 0     Tazarotene (TAZORAC) 0.05 % CREA Externally apply  topically daily as needed. Indications: Plaque Psoriasis       blood glucose (ACCU-CHEK COMPACT DRUM) test strip Use to test blood sugars 2 times daily. (Patient not taking: Reported on 1/13/2021) 200 strip 6     blood glucose (ACCU-CHEK SMARTVIEW) test strip by In Vitro route daily Reported on 2/15/2017       blood glucose monitoring (ACCU-CHEK COMPACT CARE KIT) meter device kit Use to test blood sugars 2 times daily. (Patient not taking: Reported on 1/13/2021) 1 kit 0     blood glucose monitoring (ACCU-CHEK FASTCLIX) lancets 1 each by In Vitro route daily Reported on 2/15/2017       blood glucose monitoring (SOFTCLIX) lancets Use to test blood sugar 2 times  "daily. (Patient not taking: Reported on 1/13/2021) 200 each 6     tobramycin-dexamethasone (TOBRADEX) 0.3-0.1 % ophthalmic susp Place 1 drop Into the left eye 4 times daily  0       ALLERGIES   No Known Allergies    PAST MEDICAL HISTORY   Past Medical History:   Diagnosis Date     Atrial fibrillation (H)      Cardiomyopathy (H)      Chest pain      Hyperlipidaemia      Hypertension      Migraine     benign migraine     Onychomycosis      SHAHIDA on CPAP        Review of Systems:  Skin:  Negative     Eyes:  Positive for glasses  ENT:  Negative    Respiratory:  Positive for sleep apnea;CPAP;dyspnea on exertion  Cardiovascular:    Positive for;edema;lightheadedness;dizziness;fatigue  Gastroenterology: Negative    Genitourinary:  Negative    Musculoskeletal:  Negative    Neurologic:  Negative    Psychiatric:  Positive for anxiety  Heme/Lymph/Imm:  Negative    Endocrine:  Positive for diabetes    Physical Exam:  Vitals: /85   Pulse 99   Ht 1.753 m (5' 9\")   Wt (!) 163.7 kg (361 lb)   BMI 53.31 kg/m    Body mass index is 53.31 kg/m .    Physical Exam   Constitutional: He is oriented to person, place, and time. He appears well-developed and well-nourished.   obese   Neck: Normal range of motion.   Cardiovascular: An irregularly irregular rhythm present. Exam reveals distant heart sounds.   Abdominal: Soft.   Musculoskeletal: Normal range of motion.   Neurological: He is alert and oriented to person, place, and time.   Psychiatric: He has a normal mood and affect. His behavior is normal. Judgment and thought content normal.        basic metabolic panel  Lab Results   Component Value Date    WBC 4.4 02/26/2020     Lab Results   Component Value Date    RBC 4.73 02/26/2020     Lab Results   Component Value Date    HGB 15.8 02/26/2020     Lab Results   Component Value Date    HCT 45.6 02/26/2020     No components found for: MCT  Lab Results   Component Value Date    MCV 96 02/26/2020     Lab Results   Component Value Date "    MCH 33.4 02/26/2020     Lab Results   Component Value Date    MCHC 34.6 02/26/2020     Lab Results   Component Value Date    RDW 13.2 02/26/2020     Lab Results   Component Value Date     02/26/2020     Last Comprehensive Metabolic Panel:  Sodium   Date Value Ref Range Status   09/17/2020 135 133 - 144 mmol/L Final     Potassium   Date Value Ref Range Status   09/17/2020 4.2 3.4 - 5.3 mmol/L Final     Chloride   Date Value Ref Range Status   09/17/2020 102 94 - 109 mmol/L Final     Carbon Dioxide   Date Value Ref Range Status   09/17/2020 26 20 - 32 mmol/L Final     Anion Gap   Date Value Ref Range Status   09/17/2020 7 3 - 14 mmol/L Final     Glucose   Date Value Ref Range Status   09/17/2020 161 (H) 70 - 99 mg/dL Final     Urea Nitrogen   Date Value Ref Range Status   09/17/2020 8 7 - 30 mg/dL Final     Creatinine   Date Value Ref Range Status   09/17/2020 0.80 0.66 - 1.25 mg/dL Final     GFR Estimate   Date Value Ref Range Status   09/17/2020 >90 >60 mL/min/[1.73_m2] Final     Comment:     Non  GFR Calc  Starting 12/18/2018, serum creatinine based estimated GFR (eGFR) will be   calculated using the Chronic Kidney Disease Epidemiology Collaboration   (CKD-EPI) equation.       Calcium   Date Value Ref Range Status   09/17/2020 9.1 8.5 - 10.1 mg/dL Final       CC  Cheko Osborn MD  [id number] [place] [address]

## 2021-01-13 NOTE — LETTER
1/13/2021    Cheko Osborn MD  [address]  [facility] [address]    RE: Demetri Booth       Dear Colleague,    I had the pleasure of seeing Demetri Booth in the [facility].    HPI:  Demetri Booth is a 65 year old male who presents to clinic today for annual cardiology visit.  He is a patient of Dr. Bird and has a medical history of     1. Permanent atrial fibrillation.  Treated with rate control  2. Severe obesity  3. Obstructive sleep apnea.  On CPAP  4. Diabetes type 2  5. Hypertension    Diagnostics:    Coronary catheterization (2018) revealed no significant coronary artery disease.    In 2018, patient was seen in the clinic with hypertension and mild chest pain/tightness on with gradual onset when climbing the stairs.  When he exercised the pain would radiate to his neck and go away when he stopped exercising.   Amlodipine was started for his blood pressure which did not help his chest discomfort and a coronary angiogram was recommended which revealed no significant coronary artery disease.    In April 2019, patient was seen by Dr. Bird and had decreased his metoprolol as he intermittently had dizziness.  Dr. Bird been recommended to take 150 mg in the morning and 100 mg in the evening.    In September 2, 2020, patient had complaints of scrotal edema and was started on Lasix 40 mg daily his BMP was normal    Today he reports a continued problem with dizziness if he takes all his medications at one time he feels like he is going to fall over.  Now he takes his medications throughout the day.  He states he has scrotal edema and when he takes his lasix it improves the edema but he doesn't take the lasix daily.   Overall he denies angina, dyspnea at rest or with exertion,  palpitations, orthopnea, PND. He takes his Eliquis and denies bleeding, hematuria, hematochezia, epistaxis and signs/symptoms of stroke.       ASSESSMENT AND PLAN    Permanent Atrial fibrillation    For rate  control he is taking metoprolol  mg twice daily.  Over the years, he has complained of dizziness and he decreases his metoprolol.  He is suppose to be taking 250 mg daily.  Today ECG, which was over read by me, revealed atrial fibrillation with ventricular rate of 104 bpm.  Will increase metoprolol  mg to three times daily    For anticoagulation for CHADS VASC 2 (HTN, age) he is currently taking Eliquis 5 mg twice daily.  Last hgb was 15.8 (2/2020)    Hypertension    Slightly elevated despite taking metoprolol  mg daily, lisinopril 5 mg daily, and amlodipine.      Will increase metoprolol XL to 100 mg TID    Obesity    BMI 53.    Weight loss encouraged and increase activity encouraged    Plan:  Increase metoprolol  mg BID to TID   Follow up with Dr. Bird in 1 year  Call if problems arise sooner    32 minutes spent on the date of the encounter doing chart review, review of test results, interpretation of tests, patient visit, documentation    Patient expresses understanding and agreement with the plan.     I appreciate the chance to help with Demetri Booth Please let me know if you have any questions or concerns.    Luz Maria Miranda, APRN, CNP    This note was completed in part using Dragon voice recognition software. Although reviewed after completion, some word and grammatical errors may occur.    Orders Placed This Encounter   Procedures     EKG 12-lead complete w/read - Clinics (performed today)     No orders of the defined types were placed in this encounter.    There are no discontinued medications.      Encounter Diagnosis   Name Primary?     Atrial fibrillation with RVR Yes       CURRENT MEDICATIONS:  Current Outpatient Medications   Medication Sig Dispense Refill     Acetaminophen (TYLENOL PO) Take 500 mg by mouth every 4 hours as needed        alcohol swab prep pads Use to swab area of injection/janessa as directed. 200 each 3     amLODIPine (NORVASC) 5 MG tablet Take 1 tablet (5 mg) by  mouth daily 90 tablet 1     apixaban ANTICOAGULANT (ELIQUIS) 5 MG tablet Take 1 tablet (5 mg) by mouth 2 times daily 180 tablet 0     atorvastatin (LIPITOR) 20 MG tablet TAKE 1/2 TABLET BY MOUTH ONCE DAILY 45 tablet 1     clobetasol (TEMOVATE) 0.05 % external cream APPLY TOPICALLY TWICE DAILY AS NEEDED FOR UP TO TWO WEEKS, THEN INTERMITTENLY. 30 g 3     furosemide (LASIX) 40 MG tablet TAKE 1 TABLET BY MOUTH EVERY DAY 90 tablet 2     glimepiride (AMARYL) 4 MG tablet TAKE 1 TABLET BY MOUTH EVERY DAY BEFORE BREAKFAST (Patient taking differently: 2 mg TAKE 1 TABLET BY MOUTH EVERY DAY BEFORE BREAKFAST) 90 tablet 0     hydrocortisone 1 % ointment Apply sparingly to affected area. 30 g 0     lisinopril (ZESTRIL) 5 MG tablet TAKE 1 TABLET BY MOUTH TWICE A  tablet 1     metFORMIN (GLUCOPHAGE) 1000 MG tablet TAKE 1 TABLET BY MOUTH TWICE A DAY WITH MEALS 180 tablet 0     metoprolol tartrate (LOPRESSOR) 100 MG tablet Take 1.5 tablets (150 mg) by mouth 2 times daily 270 tablet 0     multivitamin, therapeutic with minerals (THERA-VIT-M) TABS Take 1 tablet by mouth daily.       order for DME Equipment being ordered: CPAP 1 Device 0     order for DME Equipment being ordered: JOBST compression stockings knee high 20-30 mm compression 2 Device 1     order for DME CPAP every night   Needs small travel sized CPAP machine 1 Device 0     sitagliptin (JANUVIA) 100 MG tablet Take 1 tablet (100 mg) by mouth daily 90 tablet 0     Tazarotene (TAZORAC) 0.05 % CREA Externally apply  topically daily as needed. Indications: Plaque Psoriasis       blood glucose (ACCU-CHEK COMPACT DRUM) test strip Use to test blood sugars 2 times daily. (Patient not taking: Reported on 1/13/2021) 200 strip 6     blood glucose (ACCU-CHEK SMARTVIEW) test strip by In Vitro route daily Reported on 2/15/2017       blood glucose monitoring (ACCU-CHEK COMPACT CARE KIT) meter device kit Use to test blood sugars 2 times daily. (Patient not taking: Reported on  "1/13/2021) 1 kit 0     blood glucose monitoring (ACCU-CHEK FASTCLIX) lancets 1 each by In Vitro route daily Reported on 2/15/2017       blood glucose monitoring (SOFTCLIX) lancets Use to test blood sugar 2 times daily. (Patient not taking: Reported on 1/13/2021) 200 each 6     tobramycin-dexamethasone (TOBRADEX) 0.3-0.1 % ophthalmic susp Place 1 drop Into the left eye 4 times daily  0       ALLERGIES   No Known Allergies    PAST MEDICAL HISTORY   Past Medical History:   Diagnosis Date     Atrial fibrillation (H)      Cardiomyopathy (H)      Chest pain      Hyperlipidaemia      Hypertension      Migraine     benign migraine     Onychomycosis      SHAHIDA on CPAP        Review of Systems:  Skin:  Negative     Eyes:  Positive for glasses  ENT:  Negative    Respiratory:  Positive for sleep apnea;CPAP;dyspnea on exertion  Cardiovascular:    Positive for;edema;lightheadedness;dizziness;fatigue  Gastroenterology: Negative    Genitourinary:  Negative    Musculoskeletal:  Negative    Neurologic:  Negative    Psychiatric:  Positive for anxiety  Heme/Lymph/Imm:  Negative    Endocrine:  Positive for diabetes    Physical Exam:  Vitals: /85   Pulse 99   Ht 1.753 m (5' 9\")   Wt (!) 163.7 kg (361 lb)   BMI 53.31 kg/m    Body mass index is 53.31 kg/m .    Physical Exam   Constitutional: He is oriented to person, place, and time. He appears well-developed and well-nourished.   obese   Neck: Normal range of motion.   Cardiovascular: An irregularly irregular rhythm present. Exam reveals distant heart sounds.   Abdominal: Soft.   Musculoskeletal: Normal range of motion.   Neurological: He is alert and oriented to person, place, and time.   Psychiatric: He has a normal mood and affect. His behavior is normal. Judgment and thought content normal.        basic metabolic panel  Lab Results   Component Value Date    WBC 4.4 02/26/2020     Lab Results   Component Value Date    RBC 4.73 02/26/2020     Lab Results   Component Value Date "    HGB 15.8 02/26/2020     Lab Results   Component Value Date    HCT 45.6 02/26/2020     No components found for: MCT  Lab Results   Component Value Date    MCV 96 02/26/2020     Lab Results   Component Value Date    MCH 33.4 02/26/2020     Lab Results   Component Value Date    MCHC 34.6 02/26/2020     Lab Results   Component Value Date    RDW 13.2 02/26/2020     Lab Results   Component Value Date     02/26/2020     Last Comprehensive Metabolic Panel:  Sodium   Date Value Ref Range Status   09/17/2020 135 133 - 144 mmol/L Final     Potassium   Date Value Ref Range Status   09/17/2020 4.2 3.4 - 5.3 mmol/L Final     Chloride   Date Value Ref Range Status   09/17/2020 102 94 - 109 mmol/L Final     Carbon Dioxide   Date Value Ref Range Status   09/17/2020 26 20 - 32 mmol/L Final     Anion Gap   Date Value Ref Range Status   09/17/2020 7 3 - 14 mmol/L Final     Glucose   Date Value Ref Range Status   09/17/2020 161 (H) 70 - 99 mg/dL Final     Urea Nitrogen   Date Value Ref Range Status   09/17/2020 8 7 - 30 mg/dL Final     Creatinine   Date Value Ref Range Status   09/17/2020 0.80 0.66 - 1.25 mg/dL Final     GFR Estimate   Date Value Ref Range Status   09/17/2020 >90 >60 mL/min/[1.73_m2] Final     Comment:     Non  GFR Calc  Starting 12/18/2018, serum creatinine based estimated GFR (eGFR) will be   calculated using the Chronic Kidney Disease Epidemiology Collaboration   (CKD-EPI) equation.       Calcium   Date Value Ref Range Status   09/17/2020 9.1 8.5 - 10.1 mg/dL Final         Thank you for allowing me to participate in the care of your patient.    Sincerely,     EVONNE George [facility]

## 2021-01-13 NOTE — PATIENT INSTRUCTIONS
Medication changes from today    Increase your metoprolol from 100 mg twice daily to 100 mg three times a day.      If you have problems or questions please call the RNs (Ondina Andersen, & Jolene) at [phone]    Please follow up with Dr. Bird in 1 year
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
## 2021-02-01 NOTE — PATIENT INSTRUCTIONS
Patient Education   Personalized Prevention Plan  You are due for the preventive services outlined below.  Your care team is available to assist you in scheduling these services.  If you have already completed any of these items, please share that information with your care team to update in your medical record.  Health Maintenance Due   Topic Date Due     Breathing Capacity Test  1955     COPD Action Plan  1955     Eye Exam  1955     Zoster (Shingles) Vaccine (1 of 2) 02/17/2004     Diabetic Foot Exam  04/03/2020     Pneumococcal Vaccine (2 of 2 - PPSV23) 04/03/2020     Flu Vaccine (1) 09/01/2020     Discuss Advance Care Planning  10/09/2020     PHQ-2  01/01/2021     Complete Blood Count  02/26/2021        At your visit today, we discussed your risk for falls and preventive options.    Fall Prevention  Falls often occur due to slipping, tripping or losing your balance. Millions of people fall every year and injure themselves. Here are ways to reduce your risk of falling again.     Think about your fall, was there anything that caused your fall that can be fixed, removed, or replaced?    Make your home safe by keeping walkways clear of objects you may trip over, such as electric cords.    Use non-slip pads under rugs. Don't use area rugs or small throw rugs.    Use non-slip mats in bathtubs and showers.    Install handrails and lights on staircases. The handrails should be on both sides of the stairs.    Don't walk in poorly lit areas.    Don't stand on chairs or wobbly ladders.    Use caution when reaching overhead or looking upward. This position can cause a loss of balance.    Be sure your shoes fit properly, have non-slip bottoms and are in good condition.     Wear shoes both inside and out. Don't go barefoot or wear slippers.    Be cautious when going up and down stairs, curbs, and when walking on uneven sidewalks.    If your balance is poor, consider using a cane or walker.    If your fall  was related to alcohol use, stop or limit alcohol intake.     If your fall was related to use of sleeping medicines, talk to your healthcare provider about this. You may need to reduce your dosage at bedtime if you awaken during the night to go to the bathroom.      To reduce the need for nighttime bathroom trips:  ? Don't drink fluids for several hours before going to bed  ? Empty your bladder before going to bed  ? Men can keep a urinal at the bedside    Stay as active as you can. Balance, flexibility, strength, and endurance all come from exercise. They all play a role in preventing falls. Ask your healthcare provider which types of activity are right for you.    Get your vision checked on a regular basis.    If you have pets, know where they are before you stand up or walk so you don't trip over them.    Use night lights.    Go over all your medicines with a pharmacist or other healthcare provider to see if any of them could make you more likely to fall.  [employer] last reviewed this educational content on 4/1/2018 2000-2020 [employer]. [address] [place] [address], [place] [address]. All rights reserved. This information is not intended as a substitute for professional medical care. Always follow your healthcare professional's instructions.

## 2021-02-02 ENCOUNTER — OFFICE VISIT (OUTPATIENT)
Dept: FAMILY MEDICINE | Facility: CLINIC | Age: 66
End: 2021-02-02
Payer: COMMERCIAL

## 2021-02-02 VITALS
HEART RATE: 101 BPM | WEIGHT: 315 LBS | DIASTOLIC BLOOD PRESSURE: 82 MMHG | OXYGEN SATURATION: 96 % | SYSTOLIC BLOOD PRESSURE: 122 MMHG | HEIGHT: 69 IN | BODY MASS INDEX: 46.65 KG/M2 | TEMPERATURE: 97.7 F

## 2021-02-02 DIAGNOSIS — Z00.00 ENCOUNTER FOR MEDICARE ANNUAL WELLNESS EXAM: ICD-10-CM

## 2021-02-02 DIAGNOSIS — R82.998 DARK URINE: ICD-10-CM

## 2021-02-02 DIAGNOSIS — Z00.00 ENCOUNTER FOR MEDICARE ANNUAL WELLNESS EXAM: Primary | ICD-10-CM

## 2021-02-02 DIAGNOSIS — Z23 NEED FOR VACCINATION: ICD-10-CM

## 2021-02-02 DIAGNOSIS — R19.5 LOOSE STOOLS: ICD-10-CM

## 2021-02-02 LAB
ALBUMIN SERPL-MCNC: 2.9 G/DL (ref 3.4–5)
ALBUMIN UR-MCNC: NEGATIVE MG/DL
ALP SERPL-CCNC: 141 U/L (ref 40–150)
ALT SERPL W P-5'-P-CCNC: 45 U/L (ref 0–70)
ANION GAP SERPL CALCULATED.3IONS-SCNC: 5 MMOL/L (ref 3–14)
APPEARANCE UR: CLEAR
AST SERPL W P-5'-P-CCNC: 62 U/L (ref 0–45)
BACTERIA #/AREA URNS HPF: ABNORMAL /HPF
BASOPHILS # BLD AUTO: 0 10E9/L (ref 0–0.2)
BASOPHILS NFR BLD AUTO: 0.5 %
BILIRUB SERPL-MCNC: 0.9 MG/DL (ref 0.2–1.3)
BILIRUB UR QL STRIP: NEGATIVE
BUN SERPL-MCNC: 6 MG/DL (ref 7–30)
CALCIUM SERPL-MCNC: 9.2 MG/DL (ref 8.5–10.1)
CHLORIDE SERPL-SCNC: 99 MMOL/L (ref 94–109)
CO2 SERPL-SCNC: 29 MMOL/L (ref 20–32)
COLOR UR AUTO: YELLOW
CREAT SERPL-MCNC: 0.75 MG/DL (ref 0.66–1.25)
DIFFERENTIAL METHOD BLD: ABNORMAL
EOSINOPHIL # BLD AUTO: 0.2 10E9/L (ref 0–0.7)
EOSINOPHIL NFR BLD AUTO: 4.3 %
ERYTHROCYTE [DISTWIDTH] IN BLOOD BY AUTOMATED COUNT: 13.2 % (ref 10–15)
GFR SERPL CREATININE-BSD FRML MDRD: >90 ML/MIN/{1.73_M2}
GLUCOSE SERPL-MCNC: 201 MG/DL (ref 70–99)
GLUCOSE UR STRIP-MCNC: NEGATIVE MG/DL
HBA1C MFR BLD: 9.1 % (ref 0–5.6)
HCT VFR BLD AUTO: 42.9 % (ref 40–53)
HGB BLD-MCNC: 15 G/DL (ref 13.3–17.7)
HGB UR QL STRIP: NEGATIVE
KETONES UR STRIP-MCNC: NEGATIVE MG/DL
LEUKOCYTE ESTERASE UR QL STRIP: NEGATIVE
LYMPHOCYTES # BLD AUTO: 1.1 10E9/L (ref 0.8–5.3)
LYMPHOCYTES NFR BLD AUTO: 26.8 %
MCH RBC QN AUTO: 33.7 PG (ref 26.5–33)
MCHC RBC AUTO-ENTMCNC: 35 G/DL (ref 31.5–36.5)
MCV RBC AUTO: 96 FL (ref 78–100)
MONOCYTES # BLD AUTO: 0.7 10E9/L (ref 0–1.3)
MONOCYTES NFR BLD AUTO: 16.5 %
NEUTROPHILS # BLD AUTO: 2.1 10E9/L (ref 1.6–8.3)
NEUTROPHILS NFR BLD AUTO: 51.9 %
NITRATE UR QL: NEGATIVE
NON-SQ EPI CELLS #/AREA URNS LPF: ABNORMAL /LPF
PH UR STRIP: 5.5 PH (ref 5–7)
PLATELET # BLD AUTO: 136 10E9/L (ref 150–450)
POTASSIUM SERPL-SCNC: 4.1 MMOL/L (ref 3.4–5.3)
PROT SERPL-MCNC: 7.6 G/DL (ref 6.8–8.8)
RBC # BLD AUTO: 4.45 10E12/L (ref 4.4–5.9)
RBC #/AREA URNS AUTO: ABNORMAL /HPF
SODIUM SERPL-SCNC: 133 MMOL/L (ref 133–144)
SOURCE: ABNORMAL
SP GR UR STRIP: 1.02 (ref 1–1.03)
TSH SERPL DL<=0.005 MIU/L-ACNC: 1.9 MU/L (ref 0.4–4)
UROBILINOGEN UR STRIP-ACNC: 4 EU/DL (ref 0.2–1)
WBC # BLD AUTO: 4 10E9/L (ref 4–11)
WBC #/AREA URNS AUTO: ABNORMAL /HPF

## 2021-02-02 PROCEDURE — 80053 COMPREHEN METABOLIC PANEL: CPT | Performed by: PHYSICIAN ASSISTANT

## 2021-02-02 PROCEDURE — 83036 HEMOGLOBIN GLYCOSYLATED A1C: CPT | Performed by: PHYSICIAN ASSISTANT

## 2021-02-02 PROCEDURE — 84443 ASSAY THYROID STIM HORMONE: CPT | Performed by: PHYSICIAN ASSISTANT

## 2021-02-02 PROCEDURE — 85025 COMPLETE CBC W/AUTO DIFF WBC: CPT | Performed by: PHYSICIAN ASSISTANT

## 2021-02-02 PROCEDURE — 90732 PPSV23 VACC 2 YRS+ SUBQ/IM: CPT | Performed by: PHYSICIAN ASSISTANT

## 2021-02-02 PROCEDURE — 81001 URINALYSIS AUTO W/SCOPE: CPT | Performed by: PHYSICIAN ASSISTANT

## 2021-02-02 PROCEDURE — 99214 OFFICE O/P EST MOD 30 MIN: CPT | Mod: 25 | Performed by: PHYSICIAN ASSISTANT

## 2021-02-02 PROCEDURE — 36415 COLL VENOUS BLD VENIPUNCTURE: CPT | Performed by: PHYSICIAN ASSISTANT

## 2021-02-02 PROCEDURE — 99397 PER PM REEVAL EST PAT 65+ YR: CPT | Mod: 25 | Performed by: PHYSICIAN ASSISTANT

## 2021-02-02 PROCEDURE — G0009 ADMIN PNEUMOCOCCAL VACCINE: HCPCS | Performed by: PHYSICIAN ASSISTANT

## 2021-02-02 ASSESSMENT — MIFFLIN-ST. JEOR: SCORE: 2366.13

## 2021-02-04 DIAGNOSIS — R19.5 LOOSE STOOLS: ICD-10-CM

## 2021-02-04 PROCEDURE — 87506 IADNA-DNA/RNA PROBE TQ 6-11: CPT | Performed by: PHYSICIAN ASSISTANT

## 2021-02-09 ENCOUNTER — VIRTUAL VISIT (OUTPATIENT)
Dept: PHARMACY | Facility: CLINIC | Age: 66
End: 2021-02-09
Attending: PHYSICIAN ASSISTANT
Payer: COMMERCIAL

## 2021-02-09 DIAGNOSIS — E78.5 HYPERLIPIDEMIA, UNSPECIFIED HYPERLIPIDEMIA TYPE: ICD-10-CM

## 2021-02-09 DIAGNOSIS — I48.91 ATRIAL FIBRILLATION, UNSPECIFIED TYPE (H): ICD-10-CM

## 2021-02-09 DIAGNOSIS — I10 ESSENTIAL HYPERTENSION, BENIGN: ICD-10-CM

## 2021-02-09 DIAGNOSIS — Z78.9 TAKES DIETARY SUPPLEMENTS: ICD-10-CM

## 2021-02-09 DIAGNOSIS — L40.9 PSORIASIS: ICD-10-CM

## 2021-02-09 PROCEDURE — 99605 MTMS BY PHARM NP 15 MIN: CPT | Mod: TEL | Performed by: PHARMACIST

## 2021-02-09 PROCEDURE — 99607 MTMS BY PHARM ADDL 15 MIN: CPT | Mod: TEL | Performed by: PHARMACIST

## 2021-02-09 RX ORDER — CHOLECALCIFEROL (VITAMIN D3) 50 MCG
1 TABLET ORAL DAILY
COMMUNITY

## 2021-02-09 RX ORDER — ORAL SEMAGLUTIDE 3 MG/1
3 TABLET ORAL
Qty: 30 TABLET | Refills: 0 | Status: SHIPPED | OUTPATIENT
Start: 2021-02-09 | End: 2021-03-18

## 2021-02-09 RX ORDER — ORAL SEMAGLUTIDE 3 MG/1
3 TABLET ORAL
Qty: 30 TABLET | Refills: 0 | Status: SHIPPED | OUTPATIENT
Start: 2021-02-09 | End: 2021-02-09

## 2021-02-09 NOTE — PATIENT INSTRUCTIONS
Recommendations from today's MTM visit:                                                    MTM (medication therapy management) is a service provided by a clinical pharmacist designed to help you get the most of out of your medicines.   Today we reviewed what your medicines are for, how to know if they are working, that your medicines are safe and how to make your medicine regimen as easy as possible.      1. Start checking blood sugars - it would be great to have about 5 numbers that are fasting and 5 numbers that are 2 hours after a meal.    Goals are fasting  and less than 180 for 2 hours after a meal.    2. I think we could switch your Januvia to something that would work better. Let's see if your insurance will pay for Rybelsus, which is a pill similar to Trulicity.  You would take it 30 minutes before eating breakfast or taking your other medicines.    It was great to speak with you today.  I value your experience and would be very thankful for your time with providing feedback on our clinic survey. You may receive a survey via email or text message in the next few days.     Next MTM visit: 2 weeks on MyChart.    To schedule another MTM appointment, please call the clinic directly or you may call the MTM scheduling line at [phone] or toll-free at [phone].     My Clinical Pharmacist's contact information:                                                      It was a pleasure talking with you today!  Please feel free to contact me with any questions or concerns you have.      Melanie Rosales, PharmD, BCACP   Medication Management Pharmacist   [facility]  [phone]

## 2021-02-09 NOTE — LETTER
"        Date: 2/10/2021    Demetri Booth  [address]  [facility] [place] [address]    Dear Mr. Booth,    Thank you for talking with me on 2021 about your health and medications. Medicare s MTM (Medication Therapy Management) program helps you understand your medications and use them safely.      This letter includes an action plan (Medication Action Plan) and medication list (Personal Medication List). The action plan has steps you should take to help you get the best results from your medications. The medication list will help you keep track of your medications and how to use them the right way.       Have your action plan and medication list with you when you talk with your doctors, pharmacists, and other healthcare providers in your care team.     Ask your doctors, pharmacists, and other healthcare providers to update the action plan and medication list at every visit.     Take your medication list with you if you go to the hospital or emergency room.     Give a copy of the action plan and medication list to your family or caregivers.     If you want to talk about this letter or any of the papers with it, please call   [phone].We look forward to working with you, your doctors, and other healthcare providers to help you stay healthy through the [facility] MTM program.    Sincerely,  Melanie Rosales, [place] BCACP    Enclosed: Medication Action Plan and Personal Medication List    MEDICATION ACTION PLAN FOR Demetri Booth,  1955     This action plan will help you get the best results from your medications if you:   1. Read \"What we talked about.\"   2. Take the steps listed in the \"What I need to do\" boxes.   3. Fill in \"What I did and when I did it.\"   4. Fill in \"My follow-up plan\" and \"Questions I want to ask.\"     Have this action plan with you when you talk with your doctors, pharmacists, and other healthcare providers in your care team. Share this with your family or caregivers too.  DATE " PREPARED: 2/10/2021  What we talked about: glucose monitoring                                                  What I need to do: please restart glucose testing. Some tests fasting, some 2 hours after a meal/high carb snack.   What I did and when I did it:                                              What we talked about: diabetes medicines                                                  What I need to do: Sent prescription for Rybelsus to check price. If affordable, finish up Januvia then start Rybelsus in its place.       What I did and when I did it:                                               My follow-up plan:                 Questions I want to ask:              If you have any questions about your action plan, call Melanie Rosales [facility], Phone: [phone] , Monday-Friday 8-4:30pm.           PERSONAL MEDICATION LIST FOR Demetri BoothHEAVENLY 1955     This medication list was made for you after we talked. We also used information from your doctor's chart.      Use blank rows to add new medications. Then fill in the dates you started using them.    Cross out medications when you no longer use them. Then write the date and why you stopped using them.    Ask your doctors, pharmacists, and other healthcare providers to update this list at every visit. Keep this list up-to-date with:       Prescription medications    Over the counter drugs     Herbals    Vitamins    Minerals      If you go to the hospital or emergency room, take this list with you. Share this with your family or caregivers too.     DATE PREPARED: 2/10/2021  Allergies or side effects: Patient has no known allergies.     Medication:  ACCU-CHEK SOFTCLIX LANCETS MISC      How I use it:  Use to test blood sugar 2 times daily.      Why I use it: DM (diabetes mellitus) type II uncontrolled, periph vascular disorder (H)    Prescriber:  Cheko Osborn MD      Date I started using it:       Date I stopped using it:         Why I stopped using it:             Medication:  AMLODIPINE BESYLATE 5 MG PO TABS      How I use it:  Take 1 tablet (5 mg) by mouth daily      Why I use it: Benign essential hypertension    Prescriber:  Cheko Osborn MD      Date I started using it:       Date I stopped using it:         Why I stopped using it:            Medication:  APIXABAN 5 MG PO TABS      How I use it:  Take 1 tablet (5 mg) by mouth 2 times daily      Why I use it:  A fib    Prescriber:  EVONNE Cade CNP      Date I started using it:       Date I stopped using it:         Why I stopped using it:            Medication:  ATORVASTATIN CALCIUM 20 MG PO TABS      How I use it:  TAKE 1/2 TABLET BY MOUTH ONCE DAILY      Why I use it: Hypercholesterolemia    Prescriber:  Cheko Osborn MD      Date I started using it:       Date I stopped using it:         Why I stopped using it:            Medication:  CLOBETASOL PROPIONATE 0.05 % EX CREA      How I use it:  APPLY TOPICALLY TWICE DAILY AS NEEDED FOR UP TO TWO WEEKS, THEN INTERMITTENLY.      Why I use it: Psoriasis of scalp    Prescriber:  Cheko Osborn MD      Date I started using it:       Date I stopped using it:         Why I stopped using it:            Medication:  FUROSEMIDE 40 MG PO TABS      How I use it:  TAKE 1 TABLET BY MOUTH EVERY DAY      Why I use it: Cardiomyopathy, unspecified type (H); Scrotal edema    Prescriber:  Cheko Osborn MD      Date I started using it:       Date I stopped using it:         Why I stopped using it:            Medication:  GLIMEPIRIDE 4 MG PO TABS      How I use it:  TAKE 1 TABLET BY MOUTH EVERY DAY BEFORE BREAKFAST      Why I use it: Type 2 diabetes mellitus without complication, without long-term current use of insulin (H)    Prescriber:  Cheko Osborn MD      Date I started using it:       Date I stopped using it:         Why I stopped using it:            Medication:  GLUCOSE BLOOD VI STRP      How I use it:  Use to test blood sugars 2 times daily.      Why I use  it: DM (diabetes mellitus) type II uncontrolled, periph vascular disorder (H)    Prescriber:  Cheko Osborn MD      Date I started using it:       Date I stopped using it:         Why I stopped using it:            Medication:  LISINOPRIL 5 MG PO TABS      How I use it:  TAKE 1 TABLET BY MOUTH TWICE A DAY      Why I use it: Hypertension goal BP (blood pressure) < 130/80    Prescriber:  Cheko Osborn MD      Date I started using it:       Date I stopped using it:         Why I stopped using it:            Medication:  METFORMIN HCL 1000 MG PO TABS      How I use it:  TAKE 1 TABLET BY MOUTH TWICE A DAY WITH MEALS      Why I use it: Type 2 diabetes mellitus without complication, without long-term current use of insulin (H)    Prescriber:  Cheko Osborn MD      Date I started using it:       Date I stopped using it:         Why I stopped using it:            Medication:  METOPROLOL TARTRATE 100 MG PO TABS      How I use it:  Take 1 tablet (100 mg) by mouth 3 times daily      Why I use it: Hypertension goal BP (blood pressure) < 130/80    Prescriber:  EVONNE Cade CNP      Date I started using it:       Date I stopped using it:         Why I stopped using it:            Medication:  ORDER FOR DME LOCAL PRINT ONLY      How I use it:  Equipment being ordered: JOBST compression stockings knee high 20-30 mm compression      Why I use it: Cardiomyopathy, unspecified type (H)    Prescriber:  Cheko Osborn MD      Date I started using it:       Date I stopped using it:         Why I stopped using it:            Medication:  RYBELSUS 3 MG PO TABS      How I use it:  Take 3 mg by mouth daily before breakfast      Why I use it: DM (diabetes mellitus) type II uncontrolled, periph vascular disorder (H)    Prescriber:  Benita Clinton PA-C      Date I started using it:       Date I stopped using it:         Why I stopped using it:            Medication:  SITAGLIPTIN PHOSPHATE 100 MG PO TABS      How I  use it:  Take 1 tablet (100 mg) by mouth daily      Why I use it: DM (diabetes mellitus) type II uncontrolled, periph vascular disorder (H)    Prescriber:  Cheko Osborn MD      Date I started using it:       Date I stopped using it:         Why I stopped using it:            Medication:  THERA M PLUS PO TABS      How I use it:  Take 1 tablet by mouth daily.      Why I use it:  vitmain    Prescriber:  Entered By History Unknown      Date I started using it:       Date I stopped using it:         Why I stopped using it:            Medication:  TYLENOL PO      How I use it:  Take 500 mg by mouth every 4 hours as needed       Why I use it:  pain    Prescriber:  Patient Reported      Date I started using it:       Date I stopped using it:         Why I stopped using it:            Medication:  VITAMIN D3 50 MCG (2000 UT) PO TABS      How I use it:  Take 1 tablet by mouth daily      Why I use it:  vitamin    Prescriber:  Patient Reported      Date I started using it:       Date I stopped using it:         Why I stopped using it:            Medication:         How I use it:         Why I use it:      Prescriber:         Date I started using it:       Date I stopped using it:         Why I stopped using it:            Medication:         How I use it:         Why I use it:      Prescriber:         Date I started using it:       Date I stopped using it:         Why I stopped using it:            Medication:         How I use it:         Why I use it:      Prescriber:         Date I started using it:       Date I stopped using it:         Why I stopped using it:              Other Information:     If you have any questions about your medication list, call Melanie Rosales [facility], Phone: [phone] , Monday-Friday 8-4:30pm.    According to the Paperwork Reduction Act of 1995, no persons are required to respond to a collection of information unless it displays a valid OMB control number. The valid OMB number for this  information collection is [id number]. The time required to complete this information collection is estimated to average 40 minutes per response, including the time to review instructions, searching existing data resources, gather the data needed, and complete and review the information collection. If you have any comments concerning the accuracy of the time estimate(s) or suggestions for improving this form, please write to: CMS, Attn: FELA Reports Clearance Officer, [address] [place] [phone].

## 2021-02-09 NOTE — PROGRESS NOTES
Demetri is a 65 year old who is being evaluated via a billable telephone visit.      What phone number would you like to be contacted at? [phone]   How would you like to obtain your AVS? Les    Medication Therapy Management (MTM) Encounter    ASSESSMENT:                            Medication Adherence/Access: No issues identified    Type 2 Diabetes: Patient is not meeting A1c goal of < 8%. Patient would benefit from   -minimum SMBG: Check blood sugars fasting, and occasionally 2 hours after starting a meal    -DPP-4 inhibitor (Januvia) :  Discontinue after finishing remaining supply    -GLP-1 agonist (semaglutide) :  If affordable start at dose : 3mg oral daily. Will consider weekly injectables if unable to obtain Rybelsus at a reasonable cost through insurance.    Hypertension/edema: BP at goal <140/90. He is managing side effects with dividing medication doses and prefers to continue administering in this manner. Will continue to monitor.    A fib: improved symptomatically with increased dose of metoprolol. Continue to monitor.     Hyperlipidemia: stable    Psoriasis: stable    Vitamin: stable    PLAN:                            1. If affordable, finish up remaining Januvia and switch to Rybelsus 3mg daily  2. Restart checking glucose once daily, some fasting and some PPG    Follow-up: 2 weeks by Les    SUBJECTIVE/OBJECTIVE:                          Demetri Booth is a 65 year old male called for an initial visit. He was referred to me from Domi Clinton. Patient was accompanied by his wife.     Reason for visit: diabetes review.    Allergies/ADRs: Reviewed in chart. Xarelto - blood in his urine.  Tobacco: He reports that he quit smoking about 50 years ago. He has never used smokeless tobacco.  Alcohol: 4 drinks/day about 4 days per week.  Caffeine: 3 cups/day of tea with honey  Activity: sedentary, a little weight lifting   Past Medical History: Reviewed in chart  Personal Healthcare Goals: Goal weight  275#    Medication Adherence/Access: Patient takes medications directly from bottles.  Patient takes medications 3 time(s) per day. Takes most of his medications when he first wakes up, then back to bed. Will take metformin and 2nd dose of metoprolol with breakfast. 3rd dose of medication is with dinner.  Per patient, thinks he takes medication 90% of the time successfully.     Type 2 Diabetes:  Currently taking glimepiride 4mg daily, metformin 1000mg BID, Januvia 100mg daily. Patient is not experiencing side effects.  Blood sugar monitoring: rarely. Doesn't like poking himself.   Symptoms of low blood sugar? none  Symptoms of high blood sugar? none  Eye exam: due  Foot exam: up to date  Diet/Exercise: restricting food to lose weight, very small amounts multiple times a day (example one egg, then later one piece of fruit)  Aspirin: Not taking due to Eliquis  Statin: Yes: atorvastatin   ACEi/ARB: Yes: lisinopril.   Urine Albumin:   Lab Results   Component Value Date    UMALCR 84.01 (H) 05/20/2020      Lab Results   Component Value Date    A1C 9.1 02/02/2021    A1C 7.8 09/17/2020    A1C 7.5 05/20/2020    A1C 10.0 02/26/2020    A1C 6.8 09/18/2019     Hypertension/edema: Current medications include furosemide 40mg daily, lisinopril 5mg BID, amlodipine 5mg daily (restarted 6 weeks ago), metoprolol 100mg TID.  Patient does not self-monitor blood pressure, lost home BP cuff.  Patient reports the following medication side effects: dizziness/imbalance but only when he takes too many of his antihypertensives at once. Recently with splitting up his doses 3 times a day, he is not having problems with side effects.  BP Readings from Last 3 Encounters:   02/02/21 122/82   01/13/21 135/85   09/17/20 126/86      A fib: Currently taking metoprolol 100mg TID and Eliquis 5mg BID. No longer having racing heart since dose of metoprolol was increased. Problems with bleeding SE with Xarelto, has resolved with switching to Eliquis.      Hyperlipidemia: Current therapy includes atorvastatin 10mg daily.  Patient reports no significant myalgias or other side effects.  The 10-year ASCVD risk score (Pollockzi ROBERSON Jr., et al., 2013) is: 21.2%    Values used to calculate the score:      Age: 65 years      Sex: Male      Is Non- : No      Diabetic: Yes      Tobacco smoker: No      Systolic Blood Pressure: 122 mmHg      Is BP treated: Yes      HDL Cholesterol: 51 mg/dL      Total Cholesterol: 142 mg/dL  Recent Labs   Lab Test 05/20/20  1037 02/26/20  1032 08/18/14  0836 08/18/14  0836 02/05/14  1008   CHOL 142 111   < > 163 174   HDL 51 28*   < > 48 43   LDL 69 66   < > 97 113   TRIG 111 87   < > 88 92   CHOLHDLRATIO  --   --   --  3.4 4.0    < > = values in this interval not displayed.     Psoriasis: using clobetasol PRN which works well if he uses it often enough, does take some breaks in therapy. Also uses coal tar shampoo on his skin at times which he also finds helpful. No reported side effects.    Vitamin: currently taking MVI and vit D without problems for general health.   No results found for: VITDT    Today's Vitals: There were no vitals taken for this visit.  ----------------    Medicare Part D topics discussed:Self-monitoring and Medication changes    I spent 50 minutes with this patient today (an extra 15 minutes was spent creating the Medication Action Plan). All changes were made via collaborative practice agreement with Domi Clinton. A copy of the visit note was provided to the patient's referring provider.    The patient was sent via QRuso a summary of these recommendations.     Melanie Rosales, PharmD, BCACP     Telemedicine Visit Details  Type of service:  Telephone visit  Start Time: 2:33 PM  End Time: 3:20 PM  Originating Location (patient location): Home  Distant Location (provider location):  [facility]

## 2021-02-13 ENCOUNTER — MYC MEDICAL ADVICE (OUTPATIENT)
Dept: FAMILY MEDICINE | Facility: CLINIC | Age: 66
End: 2021-02-13

## 2021-02-14 NOTE — PROGRESS NOTES
Demetri is a 65 year old who is being evaluated via a billable video visit.      How would you like to obtain your AVS? MyChart  If the video visit is dropped, the invitation should be resent by: Text to cell phone: [phone]   Will anyone else be joining your video visit? Yes: Elzbieta . How would they like to receive their invitation? Text to cell phone: [phone]     Video Start Time: 1:42 PM    Assessment & Plan       ICD-10-CM    1. Loose stools  R19.5    2. Overweight BMI 50-55  E66.01    3. DM (diabetes mellitus) type II uncontrolled, periph vascular disorder (H)  E11.51     E11.65    4. Chronic combined systolic and diastolic congestive heart failure (H)  I50.42    5. Acute non-recurrent maxillary sinusitis  J01.00        Loose stools are resolving. Continue to monitor and let us know if it doesn't resolve. Continue to work on diet and exercise. Followed by MTM pharmacist Melanie for diabetes. Follows with cardiology for CHF. No new symptoms, stable. Return to clinic for any new or worsening symptoms or go to ER Urgent care in off hours            Patient Instructions   Continue to work on diet and exercise  Follow up with Melanie for sugars  Let me know if loose stools don't continue to resolve  Let me know if sinus congestion isn't improved or especially if worsened in about a week.  Follow up with me in about 6 months  Return to clinic for any new or worsening symptoms or go to ER Urgent care in off hours        No follow-ups on file.    ODALYS [facility]    Subjective   Demetri is a 65 year old who presents for the following health issues  accompanied by his spouse:    HPI       Reports loose stools every 3rd time he has a bowel movement   It started about 3 weeks ago, used to be 3-4 times per day, now less frequently  He started eating more healthy lately,  Very motivated to loose weight  He hasn't noticed and correlation with specific foods causing the  diarrhea    Starting about 3 days ago he hasn't felt as good as usual. After he takes his CPAP off, he usually gets a lot of congestion but it's usually clear, the last 3 days it's been more yellow. No sinus pressure, no history of chronic sinus infections    He gets a good 8 hours of sleep  He's been doing more stair climbing in his building  He was able to get the new diabetes medication, Rybelsus  Will stop Januvia soon  Also picking up glucose strips today so he will start monitoring his blood sugar    No other complaints or concerns          Review of Systems   CONSTITUTIONAL: NEGATIVE for fever, chills, change in weight  EYES: NEGATIVE for vision changes or irritation  ENT/MOUTH: NEGATIVE for sinus pressure, sore throat and tooth pain  RESP: NEGATIVE for significant cough or SOB  CV: NEGATIVE for chest pain, palpitations or peripheral edema  GI: NEGATIVE for nausea, abdominal pain, heartburn, or change in bowel habits  PSYCHIATRIC: NEGATIVE for changes in mood or affect      Objective           Vitals:  No vitals were obtained today due to virtual visit.    Physical Exam   GENERAL: Healthy, alert and no distress  EYES: Eyes grossly normal to inspection.  No discharge or erythema, or obvious scleral/conjunctival abnormalities.  RESP: No audible wheeze, cough, or visible cyanosis.  No visible retractions or increased work of breathing.    SKIN: Visible skin clear. No significant rash, abnormal pigmentation or lesions.  NEURO: Cranial nerves grossly intact.  Mentation and speech appropriate for age.  PSYCH: Mentation appears normal, affect normal/bright, judgement and insight intact, normal speech and appearance well-groomed.    Orders Only on 02/04/2021   Component Date Value Ref Range Status     Campylobacter group by CHEYANNE 02/04/2021 Not Detected  NDET^Not Detected Final     Salmonella species by CHEYANNE 02/04/2021 Not Detected  NDET^Not Detected Final     Shigella species by CHEYANNE 02/04/2021 Not Detected  NDET^Not  Detected Final     Vibrio group by CHEYANNE 02/04/2021 Not Detected  NDET^Not Detected Final     Rotavirus A by CHEYANNE 02/04/2021 Not Detected  NDET^Not Detected Final     Shiga toxin 1 gene by CHEYANNE 02/04/2021 Not Detected  NDET^Not Detected Final     Shiga toxin 2 gene by CHEYANNE 02/04/2021 Not Detected  NDET^Not Detected Final     Norovirus I and II by CHEYANNE 02/04/2021 Not Detected  NDET^Not Detected Final     Yersinia enterocolitica by CHEYANNE 02/04/2021 Not Detected  NDET^Not Detected Final     Enteric pathogen comment 02/04/2021    Final                    Value:Testing performed by multiplexed, qualitative PCR using the [employer] Chugig[employer] Enteric   Pathogens Nucleic Acid Test. Results should not be used as the sole basis for diagnosis,   treatment, or other patient management decisions.      Comment: Positive results do not rule out co-infection with other organisms that are   not detected by this test, and may not be the sole or definitive cause of   patient illness.   Negative results in the setting of clinical illness compatible with   gastroenteritis may be due to infection by pathogens that are not detected by   this test or non-infectious causes such as ulcerative colitis, irritable bowel   syndrome, or Crohn's disease.   Note: Shiga toxin producing E. coli (STEC) typically harbor one or both genes   that encode for Shiga toxins 1 and 2.                   Video-Visit Details    Type of service:  Video Visit    Video End Time:2:00    Originating Location (pt. Location): Home    Distant Location (provider location):  [facility]     Platform used for Video Visit: Usetrace

## 2021-02-15 ENCOUNTER — VIRTUAL VISIT (OUTPATIENT)
Dept: FAMILY MEDICINE | Facility: CLINIC | Age: 66
End: 2021-02-15
Payer: COMMERCIAL

## 2021-02-15 DIAGNOSIS — R19.5 LOOSE STOOLS: Primary | ICD-10-CM

## 2021-02-15 DIAGNOSIS — J01.00 ACUTE NON-RECURRENT MAXILLARY SINUSITIS: ICD-10-CM

## 2021-02-15 DIAGNOSIS — I50.42 CHRONIC COMBINED SYSTOLIC AND DIASTOLIC CONGESTIVE HEART FAILURE (H): ICD-10-CM

## 2021-02-15 DIAGNOSIS — E66.01 MORBID OBESITY (H): ICD-10-CM

## 2021-02-15 PROCEDURE — 99214 OFFICE O/P EST MOD 30 MIN: CPT | Mod: 95 | Performed by: PHYSICIAN ASSISTANT

## 2021-02-15 NOTE — TELEPHONE ENCOUNTER
Prescription approved per [facility] Refill Protocol.  Sent my chart message to patient    Omayra Lim RN   [facility]

## 2021-02-15 NOTE — PATIENT INSTRUCTIONS
Continue to work on diet and exercise  Follow up with Melanie for sugars  Let me know if loose stools don't continue to resolve  Let me know if sinus congestion isn't improved or especially if worsened in about a week.  Follow up with me in about 6 months  Return to clinic for any new or worsening symptoms or go to ER Urgent care in off hours

## 2021-02-18 ENCOUNTER — TELEPHONE (OUTPATIENT)
Dept: FAMILY MEDICINE | Facility: CLINIC | Age: 66
End: 2021-02-18

## 2021-02-18 RX ORDER — LANCING DEVICE/LANCETS
KIT MISCELLANEOUS
Qty: 1 EACH | Refills: 0 | Status: SHIPPED | OUTPATIENT
Start: 2021-02-18 | End: 2024-04-11

## 2021-02-18 RX ORDER — BLOOD-GLUCOSE METER
EACH MISCELLANEOUS
Qty: 1 KIT | Refills: 0 | Status: SHIPPED | OUTPATIENT
Start: 2021-02-18 | End: 2024-04-11

## 2021-02-18 NOTE — TELEPHONE ENCOUNTER
Prescription approved per [facility] Refill Protocol.    For one touch ultra meter    Mey Fontenot RN   [facility]

## 2021-03-05 ENCOUNTER — IMMUNIZATION (OUTPATIENT)
Dept: NURSING | Facility: CLINIC | Age: 66
End: 2021-03-05
Payer: COMMERCIAL

## 2021-03-05 PROCEDURE — 91303 PR COVID VAC JANSSEN AD26 0.5ML: CPT

## 2021-03-05 PROCEDURE — 0031A PR COVID VAC JANSSEN AD26 0.5ML: CPT

## 2021-03-07 DIAGNOSIS — E11.9 TYPE 2 DIABETES MELLITUS WITHOUT COMPLICATION, WITHOUT LONG-TERM CURRENT USE OF INSULIN (H): ICD-10-CM

## 2021-03-09 DIAGNOSIS — N50.89 SCROTAL EDEMA: ICD-10-CM

## 2021-03-09 DIAGNOSIS — I42.9 CARDIOMYOPATHY, UNSPECIFIED TYPE (H): ICD-10-CM

## 2021-03-09 RX ORDER — FUROSEMIDE 40 MG
TABLET ORAL
Qty: 90 TABLET | Refills: 2 | Status: SHIPPED | OUTPATIENT
Start: 2021-03-09 | End: 2021-12-27

## 2021-06-09 DIAGNOSIS — I10 HYPERTENSION GOAL BP (BLOOD PRESSURE) < 130/80: ICD-10-CM

## 2021-06-09 NOTE — TELEPHONE ENCOUNTER
Routing to PCP for review and refill as appropriate as ordering provider no longer works for [employer].   Lisinopril  Prescription approved per [facility] Refill Protocol.

## 2021-06-11 RX ORDER — LISINOPRIL 5 MG/1
TABLET ORAL
Qty: 180 TABLET | Refills: 1 | Status: SHIPPED | OUTPATIENT
Start: 2021-06-11 | End: 2021-11-08

## 2021-06-25 DIAGNOSIS — L40.9 PSORIASIS OF SCALP: ICD-10-CM

## 2021-06-25 RX ORDER — CLOBETASOL PROPIONATE 0.5 MG/G
CREAM TOPICAL
Qty: 30 G | Refills: 1 | Status: SHIPPED | OUTPATIENT
Start: 2021-06-25 | End: 2021-12-28

## 2021-06-25 NOTE — TELEPHONE ENCOUNTER
Routing to new PCP for review and refill as appropriate.     Clobetasol   Routing refill request to provider for review/approval because:  Drug not on the [facility] refill protocol

## 2021-07-14 ENCOUNTER — OFFICE VISIT (OUTPATIENT)
Dept: PODIATRY | Facility: CLINIC | Age: 66
End: 2021-07-14
Payer: COMMERCIAL

## 2021-07-14 VITALS — OXYGEN SATURATION: 97 % | WEIGHT: 315 LBS | BODY MASS INDEX: 51.83 KG/M2

## 2021-07-14 DIAGNOSIS — L84 CORNS AND CALLOSITIES: ICD-10-CM

## 2021-07-14 DIAGNOSIS — E11.51 TYPE II DIABETES MELLITUS WITH PERIPHERAL ARTERY DISEASE (H): Primary | ICD-10-CM

## 2021-07-14 PROCEDURE — 99203 OFFICE O/P NEW LOW 30 MIN: CPT | Performed by: PODIATRIST
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
## 2021-07-14 NOTE — PATIENT INSTRUCTIONS
We wish you continued good healing. If you have any questions or concerns, please do not hesitate to contact us at [phone]    [employer] (secure e-mail communication and access to your chart) to send a message or to make an appointment.    Please remember to call and schedule a follow up appointment if one was recommended at your earliest convenience.     +++OF MARCH 2020+++ LOCATION AND HOURS HAVE CHANGED    PLEASE CALL CLINICS TO VERIFY DAYS AND TIMES  PODIATRY CLINIC HOURS  TELEPHONE NUMBER    Dr. Horace HUBERPCLEM [facility]        Clinics:  [facility]   Tuesday 1PM-6PM  [place]  Wednesday 745AM-330PM  [place]  Thursday/Friday 745AM-230PM  [place] [facility] APPOINTMENTS  [phone]    [place] APPOINTMENTS  [phone]          If you need a medication refill, please contact us you may need lab work and/or a follow up visit prior to your refill (i.e. Antifungal medications).    If MRI needed please call Imaging at [phone] or [phone]    HOW DO I GET MY KNEE SCOOTER? Knee scooters can be picked up at ANY Medical Supply stores with your knee scooter Prescription.  OR    Bring your signed prescription to an [facility] showroom.
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
## 2021-09-19 ENCOUNTER — HEALTH MAINTENANCE LETTER (OUTPATIENT)
Age: 66
End: 2021-09-19

## 2021-09-23 ENCOUNTER — TRANSFERRED RECORDS (OUTPATIENT)
Dept: HEALTH INFORMATION MANAGEMENT | Facility: CLINIC | Age: 66
End: 2021-09-23

## 2021-09-23 LAB — RETINOPATHY: NEGATIVE

## 2021-10-01 ENCOUNTER — TRANSFERRED RECORDS (OUTPATIENT)
Dept: MULTI SPECIALTY CLINIC | Facility: CLINIC | Age: 66
End: 2021-10-01

## 2021-10-01 LAB — RETINOPATHY: NORMAL

## 2021-10-18 ASSESSMENT — ENCOUNTER SYMPTOMS
LEG PAIN: 0
WEIGHT GAIN: 0
NUMBNESS: 1
DECREASED APPETITE: 0
SEIZURES: 0
FATIGUE: 0
PANIC: 0
HEARTBURN: 0
WEAKNESS: 0
SHORTNESS OF BREATH: 1
BLOOD IN STOOL: 0
DIARRHEA: 0
CHILLS: 0
ORTHOPNEA: 0
SYNCOPE: 0
CONSTIPATION: 0
WHEEZING: 0
COUGH: 0
SPUTUM PRODUCTION: 0
INSOMNIA: 0
SPEECH CHANGE: 0
EXERCISE INTOLERANCE: 1
ALTERED TEMPERATURE REGULATION: 0
HYPERTENSION: 0
MEMORY LOSS: 0
VOMITING: 0
NERVOUS/ANXIOUS: 1
SLEEP DISTURBANCES DUE TO BREATHING: 0
DEPRESSION: 0
BLOATING: 1
PALPITATIONS: 1
BRUISES/BLEEDS EASILY: 0
DIZZINESS: 1
JAUNDICE: 0
NAUSEA: 0
DISTURBANCES IN COORDINATION: 0
HEMOPTYSIS: 0
TINGLING: 0
COUGH DISTURBING SLEEP: 0
POLYDIPSIA: 0
POSTURAL DYSPNEA: 0
HALLUCINATIONS: 0
TREMORS: 0
POLYPHAGIA: 0
ABDOMINAL PAIN: 0
DYSPNEA ON EXERTION: 1
PARALYSIS: 0
HYPOTENSION: 0
INCREASED ENERGY: 0
SNORES LOUDLY: 1
BOWEL INCONTINENCE: 0
SWOLLEN GLANDS: 0
DECREASED CONCENTRATION: 0
LOSS OF CONSCIOUSNESS: 0
HEADACHES: 0
LIGHT-HEADEDNESS: 0
WEIGHT LOSS: 0
NIGHT SWEATS: 0
FEVER: 0
RECTAL PAIN: 0

## 2021-10-18 ASSESSMENT — ACTIVITIES OF DAILY LIVING (ADL)
IN_THE_PAST_7_DAYS,_DID_YOU_NEED_HELP_FROM_OTHERS_TO_TAKE_CARE_OF_THINGS_SUCH_AS_LAUNDRY_AND_HOUSEKEEPING,_BANKING,_SHOPPING,_USING_THE_TELEPHONE,_FOOD_PREPARATION,_TRANSPORTATION,_OR_TAKING_YOUR_OWN_MEDICATIONS?: N
IN_THE_PAST_7_DAYS,_DID_YOU_NEED_HELP_FROM_OTHERS_TO_PERFORM_EVERYDAY_ACTIVITIES_SUCH_AS_EATING,_GETTING_DRESSED,_GROOMING,_BATHING,_WALKING,_OR_USING_THE_TOILET: Y

## 2021-10-20 ENCOUNTER — OFFICE VISIT (OUTPATIENT)
Dept: INTERNAL MEDICINE | Facility: CLINIC | Age: 66
End: 2021-10-20
Payer: COMMERCIAL

## 2021-10-20 ENCOUNTER — LAB (OUTPATIENT)
Dept: LAB | Facility: CLINIC | Age: 66
End: 2021-10-20
Payer: COMMERCIAL

## 2021-10-20 VITALS
HEIGHT: 69 IN | DIASTOLIC BLOOD PRESSURE: 74 MMHG | BODY MASS INDEX: 46.65 KG/M2 | WEIGHT: 315 LBS | OXYGEN SATURATION: 95 % | HEART RATE: 99 BPM | SYSTOLIC BLOOD PRESSURE: 134 MMHG

## 2021-10-20 DIAGNOSIS — N50.89 SCROTAL EDEMA: ICD-10-CM

## 2021-10-20 DIAGNOSIS — I10 HYPERTENSION GOAL BP (BLOOD PRESSURE) < 130/80: ICD-10-CM

## 2021-10-20 DIAGNOSIS — E11.9 TYPE 2 DIABETES MELLITUS WITHOUT COMPLICATION, WITHOUT LONG-TERM CURRENT USE OF INSULIN (H): ICD-10-CM

## 2021-10-20 DIAGNOSIS — E78.00 HYPERCHOLESTEROLEMIA: ICD-10-CM

## 2021-10-20 DIAGNOSIS — R74.01 ELEVATED AST (SGOT): ICD-10-CM

## 2021-10-20 DIAGNOSIS — E66.01 MORBID OBESITY (H): ICD-10-CM

## 2021-10-20 DIAGNOSIS — Z13.220 LIPID SCREENING: ICD-10-CM

## 2021-10-20 DIAGNOSIS — Z13.220 LIPID SCREENING: Primary | ICD-10-CM

## 2021-10-20 PROBLEM — J44.9 CHRONIC OBSTRUCTIVE PULMONARY DISEASE, UNSPECIFIED COPD TYPE (H): Status: RESOLVED | Noted: 2019-04-03 | Resolved: 2021-10-20

## 2021-10-20 LAB
ALBUMIN SERPL-MCNC: 2.8 G/DL (ref 3.4–5)
ALP SERPL-CCNC: 189 U/L (ref 40–150)
ALT SERPL W P-5'-P-CCNC: 53 U/L (ref 0–70)
ANION GAP SERPL CALCULATED.3IONS-SCNC: 3 MMOL/L (ref 3–14)
AST SERPL W P-5'-P-CCNC: 91 U/L (ref 0–45)
BILIRUB SERPL-MCNC: 1.7 MG/DL (ref 0.2–1.3)
BUN SERPL-MCNC: 6 MG/DL (ref 7–30)
CALCIUM SERPL-MCNC: 9 MG/DL (ref 8.5–10.1)
CHLORIDE BLD-SCNC: 102 MMOL/L (ref 94–109)
CHOLEST SERPL-MCNC: 158 MG/DL
CO2 SERPL-SCNC: 32 MMOL/L (ref 20–32)
CREAT SERPL-MCNC: 0.81 MG/DL (ref 0.66–1.25)
CREAT UR-MCNC: 43 MG/DL
FASTING STATUS PATIENT QL REPORTED: YES
GFR SERPL CREATININE-BSD FRML MDRD: >90 ML/MIN/1.73M2
GLUCOSE BLD-MCNC: 150 MG/DL (ref 70–99)
HBA1C MFR BLD: 7 % (ref 0–5.6)
HDLC SERPL-MCNC: 47 MG/DL
LDLC SERPL CALC-MCNC: 96 MG/DL
MICROALBUMIN UR-MCNC: 40 MG/L
MICROALBUMIN/CREAT UR: 93.02 MG/G CR (ref 0–17)
NONHDLC SERPL-MCNC: 111 MG/DL
POTASSIUM BLD-SCNC: 4.1 MMOL/L (ref 3.4–5.3)
PROT SERPL-MCNC: 7.7 G/DL (ref 6.8–8.8)
SODIUM SERPL-SCNC: 137 MMOL/L (ref 133–144)
TRIGL SERPL-MCNC: 77 MG/DL

## 2021-10-20 PROCEDURE — 36415 COLL VENOUS BLD VENIPUNCTURE: CPT | Performed by: PATHOLOGY

## 2021-10-20 PROCEDURE — 80053 COMPREHEN METABOLIC PANEL: CPT | Performed by: PATHOLOGY

## 2021-10-20 PROCEDURE — 80061 LIPID PANEL: CPT | Performed by: PATHOLOGY

## 2021-10-20 PROCEDURE — 83036 HEMOGLOBIN GLYCOSYLATED A1C: CPT | Performed by: PATHOLOGY

## 2021-10-20 PROCEDURE — 82043 UR ALBUMIN QUANTITATIVE: CPT | Performed by: PATHOLOGY

## 2021-10-20 PROCEDURE — 99387 INIT PM E/M NEW PAT 65+ YRS: CPT | Performed by: NURSE PRACTITIONER

## 2021-10-20 RX ORDER — ATORVASTATIN CALCIUM 10 MG/1
10 TABLET, FILM COATED ORAL DAILY
Qty: 90 TABLET | Refills: 3 | Status: SHIPPED | OUTPATIENT
Start: 2021-10-20 | End: 2022-09-28

## 2021-10-20 ASSESSMENT — PAIN SCALES - GENERAL: PAINLEVEL: MILD PAIN (2)

## 2021-10-20 ASSESSMENT — MIFFLIN-ST. JEOR: SCORE: 2473.17

## 2021-10-20 NOTE — NURSING NOTE
Demetri Booth is a 66 year old male patient that presents today in clinic for the following:    Chief Complaint   Patient presents with     Physical     Balance issues     The patient's allergies and medications were reviewed as noted. A set of vitals were recorded as noted without incident. The patient does not have any other questions for the provider.    Matti Cruz, EMT at 12:39 PM on 10/20/2021

## 2021-10-20 NOTE — PROGRESS NOTES
"  Assessment & Plan     Lipid screening  - Lipid panel reflex to direct LDL Fasting; Future    Type 2 diabetes mellitus without complication, without long-term current use of insulin (H)  Due to recheck A1c today.  - Hemoglobin A1c; Future  - Albumin Random Urine Quantitative with Creat Ratio; Future    Hypertension goal BP (blood pressure) < 130/80  No sign of orthostatic hypotension on exam.   - Comprehensive metabolic panel; Future    Morbid obesity (H)  Referral to Weight Management.   - Comprehensive Weight Management    Scrotal edema  He has difficulty controlling his urinary stream with his genital edema. This was previously managed with Furosemide. I'm reluctant to increase his furosemide at this point given his dizziness/lightheadedness, but we could certainly consider that going forward. He requests a referral to Urology.  - Adult Urology Referral    Hypercholesterolemia  He aparently had difficulty getting this refilled over the last year; he should be on a statin with his increased risk for ASCVD given his hx DM and HTN.   - atorvastatin (LIPITOR) 10 MG tablet; Take 1 tablet (10 mg) by mouth daily    Elevated AST  AST and Alk phos are elevated today, will proceed with US and advise cutting back on alcohol. Recheck levels in 1 month and if still persistently elevated will refer to Hepatology.       74 minutes spent on the date of the encounter doing chart review, history and exam, documentation and further activities per the note       BMI:   Estimated body mass index is 55.6 kg/m  as calculated from the following:    Height as of this encounter: 1.753 m (5' 9\").    Weight as of this encounter: 170.8 kg (376 lb 8 oz).   Weight management plan: Patient referred to endocrine and/or weight management specialty    Return in about 4 months (around 2/20/2022) for Medicare wellness.    Carla Pak, EVONNE CNP  [facility] REEMA Mosquera is a 66 year old who " "presents for the following health issues  accompanied by his spouse Iza:    HPI     Here to establish care today. Previous PCP retired.    Cardiomyopathy, A fib--stable; last saw with TALAT Miranda CNP in Cardiology in Jan 2021, recommended follow-up with Dr. Valdivia 1 year. His wife Iza reports that they had attempted cardioversion for Afib several years ago, did not convert. Continues on Eliquis 5 mg, notes that he is only taking this once a day currently because of prolonged bleeding if he gets a cut (\"12-13 hours\"). Previuosly was on Xarelto but had hematuria with this, work-up was negative. No CP, gets SOB with exertion/going up flight of stairs, does occasionally feel weak with walking, can last 5-6 minutes.   Balance is poor with taking his medication all at the same time.  Takes Gliperide--early morning.  Reports he is no longer taking Atorvastatin, doesn't remember why, but just that it wasn't refilled.  Didn't feel safe walking at times with feeling dizzy. Feels like \"being on a ship without your sea legs\". Sitting helps this resolve. Tries to sit prior to standing and that usually helps as well. Did recently get a cane and hoping this helps with stability.  Furosemide--initially started for edema around foreskin, no longer helping. Continues having edema in feet/legs, which improves with compression stockings. Denies difficulty with voiding, but the edema does make it difficult to aim.   Neuropathy has increased slightly over the last couple years, with more numbness, but notes can still feel things.  Saw Weight Management years ago. Would consider it now. Had radically reduced calorie intake in the past and lost about ~30 lbs. Lost weight in the past with eating an orange in the AM. Isn't very active.  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   Retired on 63th birthday.     DM--checking blood sugars infrequently, 185 fasting AM " 2 days ago.   Had started Semaglutide in March, but gets abdominal pain with this. Initially with a half dose was okay but didn't tolerate the 7 mg, has now been off of it for several months. Continues on Metformin 1000 mg BID, Glimepiride 4 mg daily  Had eye appointment with Dr. Ceja on [facility].  Has never used an inhaler, no known hx COPD, denies frequent SOB/cough/or wheezing, is curious COPD this has been listed in his chart.   Does have sleep apnea and uses cpap for this, which has helped tremendously.    Answers for HPI/ROS submitted by the patient on 10/18/2021  General Symptoms: Yes  Skin Symptoms: No  HENT Symptoms: No  EYE SYMPTOMS: No  HEART SYMPTOMS: Yes  LUNG SYMPTOMS: Yes  INTESTINAL SYMPTOMS: Yes  URINARY SYMPTOMS: No  REPRODUCTIVE SYMPTOMS: Yes  SKELETAL SYMPTOMS: No  BLOOD SYMPTOMS: Yes  NERVOUS SYSTEM SYMPTOMS: Yes  MENTAL HEALTH SYMPTOMS: Yes  Loss of appetite: No  Weight loss: No  Weight gain: No  Night sweats: No  Increased stress: No  Feeling hot or cold when others believe the temperature is normal: No  Loss of height: No  Post-operative complications: No  Surgical site pain: No  Change in or Loss of Energy: No  Hyperactivity: No  Confusion: No  Pain in legs with walking: No  Trouble breathing while lying down: No  Fingers or toes appear blue: No  High blood pressure: No  Low blood pressure: No  Fainting: No  Murmurs: No  Pacemaker: No  Varicose veins: No  Wake up at night with shortness of breath: No  Exercise intolerance: Yes  Sputum or phlegm: No  Coughing up blood: No  Snoring: Yes  Difficulty breathing on exertion: Yes  Nighttime Cough: No  Difficulty breathing when lying flat: No  Bloating: Yes  Blood in stool: No  Black stools: No  Fecal incontinence: No  Yellowing of skin or eyes: No  Vomit with blood: No  Change in stools: No  Scrotal pain or swelling: Yes  Erectile dysfunction: Yes  Genital ulcers: No  Reduced libido: Yes  Edema or swelling: Yes  Anemia: No  Swollen glands:  "No  Trouble with coordination: No  Fainting or black-out spells: No  Memory loss: No  Speech problems: No  Tingling: No  Difficulty walking: Yes  Paralysis: No  Depression: No  Trouble sleeping: No  Mood changes: Yes  Panic attacks: No      Review of Systems   Constitutional, HEENT, cardiovascular, pulmonary, gi and gu systems are negative, except as otherwise noted.      Objective    /74   Pulse 99   Ht 1.753 m (5' 9\")   Wt (!) 170.8 kg (376 lb 8 oz)   SpO2 96%   BMI 55.60 kg/m    Body mass index is 55.6 kg/m .  Physical Exam   GENERAL: healthy, alert and no distress  NECK: no adenopathy, no asymmetry, masses, or scars and thyroid normal to palpation  RESP: lungs clear to auscultation - no rales, rhonchi or wheezes  CV: regular rate and rhythm, normal S1 S2, no S3 or S4, no murmur, click or rub, no peripheral edema and peripheral pulses strong  ABDOMEN: soft, nontender, no hepatosplenomegaly, no masses and bowel sounds normal  : +scrotal edema and edematous foreskin, no lesions noted  MS: ambulates slowly, 3-4+ edema in BLE  SKIN: dry scaling trena skin over BLE  NEURO: Normal strength and tone, mentation intact and speech normal  PSYCH: mentation appears normal, affect normal/bright  Diabetic foot exam: no trophic changes or ulcerative lesions, DP & PT reduced bilaterally 2/2 edema, venous stasis dermatitis noted, dependent rubor present, dry cracking heels, onychomycosis                 "

## 2021-11-07 DIAGNOSIS — I10 HYPERTENSION GOAL BP (BLOOD PRESSURE) < 130/80: ICD-10-CM

## 2021-11-08 DIAGNOSIS — E11.9 TYPE 2 DIABETES MELLITUS WITHOUT COMPLICATION, WITHOUT LONG-TERM CURRENT USE OF INSULIN (H): ICD-10-CM

## 2021-11-08 RX ORDER — LISINOPRIL 5 MG/1
TABLET ORAL
Qty: 180 TABLET | Refills: 1 | Status: SHIPPED | OUTPATIENT
Start: 2021-11-08 | End: 2022-08-12

## 2021-11-10 NOTE — TELEPHONE ENCOUNTER
MEDICAL RECORDS REQUEST   [facility]  Urology Clinic  [address] [place] [address]  PHONE: [phone]  Fax: [phone]        FUTURE VISIT INFORMATION                                                   Demetri Booth, : 1955 scheduled for future visit at [facility]    APPOINTMENT INFORMATION:    Date: 21    Provider:  Juan Ramon    Reason for Visit/Diagnosis: Scrotal edema    REFERRAL INFORMATION:    Referring provider:  Mellisa    Specialty: IM    Referring providers clinic:  [place]    RECORDS REQUESTED FOR VISIT                                                     NOTES  STATUS/DETAILS   OFFICE NOTE from referring provider  yes   OFFICE NOTE from other specialist  no   DISCHARGE SUMMARY from hospital  no   DISCHARGE REPORT from the ER  no   OPERATIVE REPORT  no   MEDICATION LIST  yes   LABS     URINALYSIS (UA)  yes   URINE CYTOLOGY  yes     PRE-VISIT CHECKLIST      Record collection complete Yes - records in Epic   Appointment appropriately scheduled           (right time/right provider) Yes   Joint diagnostic appointment coordinated correctly          (ensure right order & amount of time) Yes   MyChart activation Yes   Questionnaire complete No

## 2021-11-10 NOTE — TELEPHONE ENCOUNTER
glimepiride (AMARYL) 4 MG tablet  TAKE 1 TABLET BY MOUTH EVERY DAY BEFORE BREAKFAST      Last Written Prescription Date:  10/23/19  Last Fill Quantity: 90,   # refills: 0  Last Office Visit : 10/20/21  Future Office visit:  none    Routing refill request to provider for review/approval because:  Gap in medication

## 2021-11-11 RX ORDER — GLIMEPIRIDE 2 MG/1
2 TABLET ORAL DAILY
Qty: 180 TABLET | Refills: 0 | Status: SHIPPED | OUTPATIENT
Start: 2021-11-11 | End: 2022-01-20

## 2021-11-15 ENCOUNTER — VIRTUAL VISIT (OUTPATIENT)
Dept: ENDOCRINOLOGY | Facility: CLINIC | Age: 66
End: 2021-11-15
Payer: COMMERCIAL

## 2021-11-15 VITALS — HEIGHT: 69 IN | BODY MASS INDEX: 46.65 KG/M2 | WEIGHT: 315 LBS

## 2021-11-15 DIAGNOSIS — Z71.3 NUTRITIONAL COUNSELING: Primary | ICD-10-CM

## 2021-11-15 DIAGNOSIS — E11.65 TYPE 2 DIABETES MELLITUS WITH HYPERGLYCEMIA, WITHOUT LONG-TERM CURRENT USE OF INSULIN (H): ICD-10-CM

## 2021-11-15 DIAGNOSIS — E66.01 MORBID OBESITY (H): ICD-10-CM

## 2021-11-15 DIAGNOSIS — E11.65 TYPE 2 DIABETES MELLITUS WITH HYPERGLYCEMIA, WITHOUT LONG-TERM CURRENT USE OF INSULIN (H): Primary | ICD-10-CM

## 2021-11-15 PROCEDURE — 99203 OFFICE O/P NEW LOW 30 MIN: CPT | Mod: 95 | Performed by: INTERNAL MEDICINE

## 2021-11-15 PROCEDURE — 97802 MEDICAL NUTRITION INDIV IN: CPT | Mod: 95 | Performed by: DIETITIAN, REGISTERED

## 2021-11-15 ASSESSMENT — PAIN SCALES - GENERAL: PAINLEVEL: NO PAIN (0)

## 2021-11-15 ASSESSMENT — MIFFLIN-ST. JEOR: SCORE: 2362.04

## 2021-11-15 NOTE — PATIENT INSTRUCTIONS
Nutrition Goals  1) Continue with no alcohol at this time    2) Water as main beverage choice    3) Use the Plate method plan for general guidance on getting balanced meals and general portion sizes (see link below for picture/more information)                 Make 1/2 your plate non starchy vegetables (cauliflower, broccoli, asparagus, Almond sprouts, lettuce, carrots, for example).                3+ oz lean protein sources (salmon/skinless chicken/turkey breast/pork loin/lean cuts of beef/ or non-animal protein such as black, kidney or saunders beans, tofu/edamame/tempeh)     (Note: 3 oz = deck of cards size and 1/4 cup = 1 oz of a protein food, so if you have beans/ground meats or ground soy crumbles then it would be 3/4 cup to equal 3 oz per meal).                 1/2 to 1 cup carbohydrate choices such as whole grain starches or starchy vegetables or fruit. For example: quinoa, brown rice, barley, potatoes, sweet potatoes, winter squash, peas, corn, or fruit.                 Choose ~0-2 added fat servings at a meal (avocados, nut butters, nuts or seeds, olive oil, vegetable oils).    Keep up the great work!    Plate Method For Diabetes   https://www.diabetesfoodhub.org/articles/what-is-the-diabetes-plate-method.html     Protein Sources   [email or web address]       Follow-Up:  ANISHA Hanson, RD, LD  Clinic #: [phone]

## 2021-11-15 NOTE — NURSING NOTE
"(   Chief Complaint   Patient presents with     New Patient     New MWM, BMI: 55    )    ( Weight: (!) 159.7 kg (352 lb) (Patient reported) )  ( Height: 175.3 cm (5' 9\") )  ( BMI (Calculated): 51.98 )  (   )  (   )  (   )  (   )  (   )  (   )    (   )  (   )  (   )  (   )  (   )  (   )  (   )    (   Patient Active Problem List   Diagnosis     Hypercholesterolemia     Hypertension goal BP (blood pressure) < 130/80     Overweight BMI 50-55     Arrhythmia     Atrial fibrillation with RVR (H)     Atrial fibrillation (H)     Essential hypertension, benign     Onychomycosis     Sleep apnea     Chest pain     Hyperlipidemia     Cardiomyopathy (H)     ACP (advance care planning)     DM (diabetes mellitus) type II uncontrolled, periph vascular disorder (H)    )  (   Current Outpatient Medications   Medication Sig Dispense Refill     Acetaminophen (TYLENOL PO) Take 500 mg by mouth every 4 hours as needed        apixaban ANTICOAGULANT (ELIQUIS) 5 MG tablet Take 1 tablet (5 mg) by mouth 2 times daily 180 tablet 3     atorvastatin (LIPITOR) 10 MG tablet Take 1 tablet (10 mg) by mouth daily 90 tablet 3     blood glucose (ACCU-CHEK COMPACT DRUM) test strip Use to test blood sugars 2 times daily. 200 strip 6     blood glucose (NO BRAND SPECIFIED) lancets standard Use to test blood sugar 2 times daily or as directed. 100 lancet 1     blood glucose (NO BRAND SPECIFIED) test strip Use to test blood sugar 2 times daily or as directed. 100 strip 1     blood glucose (ONE TOUCH DELICA) lancing device Device to be used with lancets. 1 each 0     blood glucose monitoring (ONE TOUCH ULTRA 2) meter device kit Use to test blood sugar 2 times daily or as directed. 1 kit 0     blood glucose monitoring (SOFTCLIX) lancets Use to test blood sugar 2 times daily. 200 each 6     clobetasol (TEMOVATE) 0.05 % external cream APPLY TOPICALLY TWICE DAILY AS NEEDED FOR UP TO TWO WEEKS, THEN INTERMITTENLY. 30 g 1     furosemide (LASIX) 40 MG tablet TAKE 1 " TABLET BY MOUTH EVERY DAY 90 tablet 2     glimepiride (AMARYL) 2 MG tablet Take 1 tablet (2 mg) by mouth daily TAKE 1 TABLET BY MOUTH EVERY DAY BEFORE BREAKFAST 180 tablet 0     lisinopril (ZESTRIL) 5 MG tablet TAKE 1 TABLET BY MOUTH TWICE A  tablet 1     metFORMIN (GLUCOPHAGE) 1000 MG tablet Take 1 tablet (1,000 mg) by mouth 2 times daily (with meals) 180 tablet 1     metoprolol tartrate (LOPRESSOR) 100 MG tablet Take 1 tablet (100 mg) by mouth 3 times daily 270 tablet 3     multivitamin, therapeutic with minerals (THERA-VIT-M) TABS Take 1 tablet by mouth daily.       order for DME Equipment being ordered: CPAP 1 Device 0     order for DME Equipment being ordered: JOBST compression stockings knee high 20-30 mm compression 2 Device 1     vitamin D3 (CHOLECALCIFEROL) 50 mcg (2000 units) tablet Take 1 tablet by mouth daily       Semaglutide (RYBELSUS) 7 MG TABS Take 7 mg by mouth daily (Patient not taking: Reported on 10/20/2021) 30 tablet 1    )  ( Diabetes Eval:    )    ( Pain Eval:  No Pain (0) )    ( Wound Eval:       )    (   History   Smoking Status     Former Smoker     Quit date: 9/11/1970   Smokeless Tobacco     Never Used    )    ( Signed By:  Meredith Mena, EMT; November 15, 2021; 9:28 AM )

## 2021-11-15 NOTE — LETTER
"11/15/2021       RE: Demetri Booth  [address]  [facility] [address]     Dear Colleague,    Thank you for referring your patient, Demetri Booth, to the [facility] [place] at [facility]. Please see a copy of my visit note below.    Demetri Booth is a 66 year old male who is being evaluated via a billable video visit.      The patient has been notified of following:     \"This video visit will be conducted via a call between you and your physician/provider. We have found that certain health care needs can be provided without the need for an in-person physical exam.  This service lets us provide the care you need with a video conversation.  If a prescription is necessary we can send it directly to your pharmacy.  If lab work is needed we can place an order for that and you can then stop by our lab to have the test done at a later time.    Video visits are billed at different rates depending on your insurance coverage.  Please reach out to your insurance provider with any questions.    If during the course of the call the physician/provider feels a video visit is not appropriate, you will not be charged for this service.\"    How would you like to obtain your AVS? MyChart  If the video visit is dropped, the invitation should be resent by: [phone]  Will anyone else be joining your video visit? Yes, wife (Elzbieta)    Video-Visit Details    Type of service:  Video Visit    Video Start Time: 11:03 am   Video End Time: 11:33 am    Originating Location (pt. Location): Home    Distant Location (provider location):  [facility] [place]     Platform used for Video Visit: [place]    During this virtual visit the patient is located in [place], patient verifies this as the location during the entirety of this visit.     New Weight Management Nutrition Consultation    Demetri Booth is a 66 year old male " "presents today for new weight management nutrition consultation.      Patient referred by Dr. Mora on November 15, 2021.    Patient with Co-morbidities of obesity including:  Type II DM yes, recent A1C 7.0 10/20. Down from 9.1 2/2/21  Renal Failure no  Sleep apnea yes  Hypertension yes  Dyslipidemia yes. Recent lipid profile looked good 10/20/21  Joint pain yes, hip pain per pt  Back pain no  GERD no    PMH: afib, depression, lower extremity swelling,     Anthropometrics:  Weight 11/15/21: 352 lbs    Estimated body mass index is 51.98 kg/m  as calculated from the following:    Height as of an earlier encounter on 11/15/21: 1.753 m (5' 9\").    Weight as of an earlier encounter on 11/15/21: 159.7 kg (352 lb).    Medications for Weight Loss:  Semaglutide (ozempic) prescribed 11/15/21    Continues on Metformin 1000 mg BID  Glimepiride 4 mg daily  Checking BG infrequently     NUTRITION HISTORY  See MD note for details.    Per MD note: pt eats a high carb diet and has an irregular eating pattern. He has changed to more fruits/veggies, lean protein and cut out snacks. Also stopped drinking alcohol and switched from sugar to truvia.     Pt goals: improve quality of life - be able to pick stuff up off floor, go up stairs more easily     Pt reports he has lost 21 lbs on his own so far. Made major changes to his diet.    Saw RD over 20 years ago.    Eating mostly fruits/veggies and chicken. Finding that if he eats high fat food is does not feel good (such as a bratwurst)  Starts day with 1 cup tea.    Fluids: no more than 1 cup milk/day, 2 cups tea, water  Previously was drinking alcohol 4-5 per day 6 days/week. None currently. Also decreased consumption of milk.    Recent recall:  - Apple  - 2 Chicken Wings  - Baby carrots  *Wife concerned that pt is not eating enough and fasting too long. Discussed.     Plans to have 1/2 potatos, mixed veggies, and chicken for dinner tonight.     Physical Activity:  Using 10 lb weight " in arms when watching TV  Up/down stairs a few times per day    Nutrition Prescription  Recommended energy/nutrient modification.    Nutrition Diagnosis    Obesity r/t positive energy balance aeb BMI >30.    Nutrition Intervention  Reviewed current dietary habits and pts history   Discussed long-term goals pt hopes to accomplish in RD appointments  Answered pt questions  Coordination of care   Nutrition education   AVS and handouts via [employer]    Patient demonstrates understanding.    Expected Engagement: good    Nutrition Goals  1) Continue with no alcohol at this time    2) Water as main beverage choice    3) Use the Plate method plan for general guidance on getting balanced meals and general portion sizes (see link below for picture/more information)                 Make 1/2 your plate non starchy vegetables (cauliflower, broccoli, asparagus, [place] sprouts, lettuce, carrots, for example).                3+ oz lean protein sources (salmon/skinless chicken/turkey breast/pork loin/lean cuts of beef/ or non-animal protein such as black, kidney or saunders beans, tofu/edamame/tempeh)     (Note: 3 oz = deck of cards size and 1/4 cup = 1 oz of a protein food, so if you have beans/ground meats or ground soy crumbles then it would be 3/4 cup to equal 3 oz per meal).                 1/2 to 1 cup carbohydrate choices such as whole grain starches or starchy vegetables or fruit. For example: quinoa, brown rice, barley, potatoes, sweet potatoes, winter squash, peas, corn, or fruit.                 Choose ~0-2 added fat servings at a meal (avocados, nut butters, nuts or seeds, olive oil, vegetable oils).    Keep up the great work!    Plate Method For Diabetes   https://www.diabetesfoodhub.org/articles/what-is-the-diabetes-plate-method.html     Protein Sources   [email or web address]       Follow-Up:  PRN    Time spent with patient: 30 minutes.  ANISHA Burciaga, RD, LD

## 2021-11-15 NOTE — LETTER
11/15/2021       RE: Demetri Booth  [address]  [facility] [address]     Dear Colleague,    Thank you for referring your patient, Demetri Booth, to the [facility] [place] [facility]. Please see a copy of my visit note below.    Demetri is a 66 year old who is being evaluated via a billable video visit.      How would you like to obtain your AVS? MyChart  If the video visit is dropped, the invitation should be resent by: [phone]  Will anyone else be joining your video visit? No    During this virtual visit the patient is located in [place], patient verifies this as the location during the entirety of this visit.     Video-Visit Details    Type of service:  Video Visit    Video call duration: 30 minutes.  I explained the conditions and plans (more than 50% of time was counseling/coordination of weight management).    Originating Location (pt. Location): Home    Distant Location (provider location):  [facility] WE[facility]NA[facility]EMENT [facility]     Platform used for Video Visit: [facility] Weight Management Consult    PATIENT:  Demetri Booth  MRN:         [id number]  :         1955  CARMENZA:         11/15/2021    Dear Carla Pak, EVONNE CNP,    I had the pleasure of seeing your patient, Demetri Booth.  Full intake/assessment done to determine barriers to weight loss success and develop a treatment plan.  Demetri Booth is a 66 year old male interested in treatment of medical problems associated with weight.  His weight today is 352 lbs 0 oz, Body mass index is 51.98 kg/m ., and he has the following co-morbidities:     2021   I have the following health issues associated with obesity: Type II Diabetes, High Blood Pressure, Sleep Apnea   I have the following symptoms associated with obesity: Depression, Lower Extremity Swelling, Groin Rash, Hip Pain     Patient Goals 2021   I  "am interested in having a healthier weight to diminish current health problems: Yes   I am interested in having a healthier weight in order to prevent future health problems: Yes   I am interested in having a healthier weight in order to have a future surgery: No     Referring Provider 11/13/2021   Please name the provider who referred you to Medical Weight Management.  If you do not know, please answer: \"I Don't Know\". Carla WILL       Wt Readings from Last 4 Encounters:   11/15/21 (!) 159.7 kg (352 lb)   10/20/21 (!) 170.8 kg (376 lb 8 oz)   07/14/21 (!) 159.2 kg (351 lb)   02/02/21 (!) 159.6 kg (351 lb 12.8 oz)     Weight History 11/13/2021   How concerned are you about your weight? Somewhat Concerned   Would you describe your weight gain as gradual? Yes   I became overweight: As an Adult   The following factors have contributed to my weight gain:  Change in Schedule, Eating Wrong Types of Food, Eating Too Much, Lack of Exercise   I have tried the following methods to lose weight: Watching Portions or Calories, Fasting   My highest weight since age 17 was: 374   The most weight I have ever lost was: (lbs) 30   I have the following family history of obesity/being overweight:  I am the only one in my immediate family who is overweight   Has anyone in your family had weight loss surgery? No     Diet Recall 11/13/2021   Glass juice/day 0   Glass milk/day 1   Glass sugary drink/day 0   How many cans/bottles of sugar pop/soda/tea/sports drinks do you drink in a day? 2   Diet drink/day 0   Alcohol 4 or More Times a Week   Drinks/day 3-4     Eating Habits 11/13/2021   Generally, my meals include foods like these: bread, pasta, rice, potatoes, corn, crackers, sweet dessert, pop, or juice. Almost Everyday   Generally, my meals include foods like these: fried meats, brats, burgers, french fries, pizza, cheese, chips, or ice cream. Once a Week   Eat fast food (like [employer], [employer], [employer]aco Bell). Once a Week "   Eat at a buffet or sit-down restaurant. Less Than Weekly   Eat most of my meals in front of the TV or computer. Everyday   Often skip meals, eat at random times, have no regular eating times. Everyday   Rarely sit down for a meal but snack or graze throughout.  Never   Eat extra snacks between meals. Never   Eat most of my food at the end of the day. A Few Times a Week   Eat in the middle of the night or wake up at night to eat. Never   Eat extra snacks to prevent or correct low blood sugar. Never   Eat to prevent acid reflux or stomach pain. Never   Worry about not having enough food to eat. Never   Have you been to the food shelf at least a few times this year? No   I eat when I am depressed. Less Than Weekly   I eat when I am stressed. Less Than Weekly   I eat when I am bored. Less Than Weekly   I eat when I am anxious. Less Than Weekly   I eat when I am happy or as a reward. Less Than Weekly   I feel hungry all the time even if I just have eaten. Less Than Weekly   Feeling full is important to me. Never   I finish all the food on my plate even if I am already full. Less Than Weekly   I can't resist eating delicious food or walk past the good food/smell. Less Than Weekly   I eat/snack without noticing that I am eating. Never   I eat when I am preparing the meal. Once a Week   I eat more than usual when I see others eating. Less Than Weekly   I have trouble not eating sweets, ice cream, cookies, or chips if they are around the house. Once a Week   I think about food all day. Never   Please list any other foods you crave? Chicken, beef, good bread     Activity/Exercise History 11/13/2021   How much of a typical 12 hour day do you spend sitting? Most of the Day   How much of a typical 12 hour day do you spend lying down? Less Than Half the Day   How much of a typical day do you spend walking/standing? Less Than Half the Day   How many hours (not including work) do you spend on the TV/Video  Games/Computer/Tablet/Phone? 6 Hours or More   How many times a week are you active for the purpose of exercise? Never   What keeps you from being more active? Pain, Other   How many total minutes do you spend doing some activity for the purpose of exercising when you exercise? Less Than 15 Minutes     PAST MEDICAL HISTORY:  Past Medical History:   Diagnosis Date     Atrial fibrillation (H)      Cardiomyopathy (H)      Chest pain      Hyperlipidaemia      Hypertension      Migraine     benign migraine     Onychomycosis      SHAHIDA on CPAP      Work/Social History Reviewed With Patient 11/13/2021   My employment status is: Retired   What is your marital status? /In a Relationship   If in a relationship, is your significant other overweight? Yes   Do you have children? Yes   If you have children, are they overweight? No   Who do you live with?  Spouse   Are they supportive of your health goals? Yes   Who does the food shopping?  Spouse     Mental Health History Reviewed With Patient 11/13/2021   Have you ever been physically or sexually abused? No   How often in the past 2 weeks have you felt little interest or pleasure in doing things? For Several Days   Over the past 2 weeks how often have you felt down, depressed, or hopeless? Not at all       Sleep History Reviewed With Patient 11/13/2021   How many hours do you sleep at night? 8   Do you think that you snore loudly or has anybody ever heard you snore loudly (louder than talking or so loud it can be heard behind a shut door)? Yes   Has anyone seen or heard you stop breathing during your sleep? Yes   Do you often feel tired, fatigued, or sleepy during the day? No   Do you have a TV/Computer in your bedroom? No     MEDICATIONS:   Current Outpatient Medications   Medication Sig Dispense Refill     Acetaminophen (TYLENOL PO) Take 500 mg by mouth every 4 hours as needed        apixaban ANTICOAGULANT (ELIQUIS) 5 MG tablet Take 1 tablet (5 mg) by mouth 2 times daily  180 tablet 3     atorvastatin (LIPITOR) 10 MG tablet Take 1 tablet (10 mg) by mouth daily 90 tablet 3     blood glucose (ACCU-CHEK COMPACT DRUM) test strip Use to test blood sugars 2 times daily. 200 strip 6     blood glucose (NO BRAND SPECIFIED) lancets standard Use to test blood sugar 2 times daily or as directed. 100 lancet 1     blood glucose (NO BRAND SPECIFIED) test strip Use to test blood sugar 2 times daily or as directed. 100 strip 1     blood glucose (ONE TOUCH DELICA) lancing device Device to be used with lancets. 1 each 0     blood glucose monitoring (ONE TOUCH ULTRA 2) meter device kit Use to test blood sugar 2 times daily or as directed. 1 kit 0     blood glucose monitoring (SOFTCLIX) lancets Use to test blood sugar 2 times daily. 200 each 6     clobetasol (TEMOVATE) 0.05 % external cream APPLY TOPICALLY TWICE DAILY AS NEEDED FOR UP TO TWO WEEKS, THEN INTERMITTENLY. 30 g 1     furosemide (LASIX) 40 MG tablet TAKE 1 TABLET BY MOUTH EVERY DAY 90 tablet 2     glimepiride (AMARYL) 2 MG tablet Take 1 tablet (2 mg) by mouth daily TAKE 1 TABLET BY MOUTH EVERY DAY BEFORE BREAKFAST 180 tablet 0     lisinopril (ZESTRIL) 5 MG tablet TAKE 1 TABLET BY MOUTH TWICE A  tablet 1     metFORMIN (GLUCOPHAGE) 1000 MG tablet Take 1 tablet (1,000 mg) by mouth 2 times daily (with meals) 180 tablet 1     metoprolol tartrate (LOPRESSOR) 100 MG tablet Take 1 tablet (100 mg) by mouth 3 times daily 270 tablet 3     multivitamin, therapeutic with minerals (THERA-VIT-M) TABS Take 1 tablet by mouth daily.       order for DME Equipment being ordered: CPAP 1 Device 0     order for DME Equipment being ordered: JOBST compression stockings knee high 20-30 mm compression 2 Device 1     vitamin D3 (CHOLECALCIFEROL) 50 mcg (2000 units) tablet Take 1 tablet by mouth daily       Semaglutide (RYBELSUS) 7 MG TABS Take 7 mg by mouth daily (Patient not taking: Reported on 10/20/2021) 30 tablet 1     ALLERGIES:   No Known  "Allergies    PHYSICAL EXAM:  Ht 1.753 m (5' 9\")   Wt (!) 159.7 kg (352 lb)   BMI 51.98 kg/m     A & O x 3  HEENT: NCAT, mucous membranes moist  Respirations unlabored  Location of obesity: Mixed Obesity    ASSESSMENT:  Demetri is a patient with mature onset super morbid obesity without significant element of familial/genetic influence and with current health consequences. He does need aggressive weight loss plan due to Type II Diabetes, High Blood Pressure, Sleep Apnea. Has recently lost 21 lb by smaller portions and diuretic.    Demetri Booth eats a high carb diet, eats fast food once or more per week and has a disorganized meal pattern. Has changed to fruits,veg and lean protein, cut out snacks. Stopped drinking ETOH. Feels appetite has dropped off with these changes. Switched from sugar to Truvia.    His problem is complicated by strong craving/reward pathways    His ability to lose weight is impacted by physical impairment and lack of confidence.    PLAN:    Volumetrics eating plan  Meal planning    Diabetes   Ancillary testing:  N/A. Frequent home glucose monitoring with report to nurse as normal weight decreases.   Food Plan:  Low carbohydrate.  Activity Plan:  Activity journal.  Supplementary:  N/A.  Medication: The patient will begin medication in pursuit of improved medical status as influenced by body weight. He will start Semaglutide.  There is a mutual understanding of the goals and risks of this therapy. The patient is in agreement. He is educated on dosage regimen and possible side effects.    RTC:    12 weeks.  Video call duration: 30 minutes.  I explained the conditions and plans (more than 50% of time was counseling/coordination of weight management).    Sincerely,  Nolan Mora MD    The patient was evaluated via a billable video visit and notified \"This visit will be via video call between you and your physician. If lab work is needed we can place an order and you can later stop by the " "lab. Video visits are billed at different rates depending on your insurance coverage.  Please reach out to your insurance provider with any questions. If the physician feels a video visit is not appropriate, you will not be charged for this service.\" Patient gave verbal consent for Video visit and would you like to obtain AVS by [employer].    "

## 2021-11-15 NOTE — PROGRESS NOTES
"Demetri Booth is a 66 year old male who is being evaluated via a billable video visit.      The patient has been notified of following:     \"This video visit will be conducted via a call between you and your physician/provider. We have found that certain health care needs can be provided without the need for an in-person physical exam.  This service lets us provide the care you need with a video conversation.  If a prescription is necessary we can send it directly to your pharmacy.  If lab work is needed we can place an order for that and you can then stop by our lab to have the test done at a later time.    Video visits are billed at different rates depending on your insurance coverage.  Please reach out to your insurance provider with any questions.    If during the course of the call the physician/provider feels a video visit is not appropriate, you will not be charged for this service.\"    How would you like to obtain your AVS? MyChart  If the video visit is dropped, the invitation should be resent by: [phone]  Will anyone else be joining your video visit? Yes, wife (Elzbieta)    Video-Visit Details    Type of service:  Video Visit    Video Start Time: 11:03 am   Video End Time: 11:33 am    Originating Location (pt. Location): Home    Distant Location (provider location):  [facility]NAG[facility] [place]     Platform used for Video Visit: [employer]    During this virtual visit the patient is located in [place], patient verifies this as the location during the entirety of this visit.     New Weight Management Nutrition Consultation    Demetri Booth is a 66 year old male presents today for new weight management nutrition consultation.      Patient referred by Dr. Mora on November 15, 2021.    Patient with Co-morbidities of obesity including:  Type II DM yes, recent A1C 7.0 10/20. Down from 9.1 2/2/21  Renal Failure no  Sleep apnea yes  Hypertension yes  Dyslipidemia yes. Recent lipid profile " "looked good 10/20/21  Joint pain yes, hip pain per pt  Back pain no  GERD no    PMH: afib, depression, lower extremity swelling,     Anthropometrics:  Weight 11/15/21: 352 lbs    Estimated body mass index is 51.98 kg/m  as calculated from the following:    Height as of an earlier encounter on 11/15/21: 1.753 m (5' 9\").    Weight as of an earlier encounter on 11/15/21: 159.7 kg (352 lb).    Medications for Weight Loss:  Semaglutide (ozempic) prescribed 11/15/21    Continues on Metformin 1000 mg BID  Glimepiride 4 mg daily  Checking BG infrequently     NUTRITION HISTORY  See MD note for details.    Per MD note: pt eats a high carb diet and has an irregular eating pattern. He has changed to more fruits/veggies, lean protein and cut out snacks. Also stopped drinking alcohol and switched from sugar to truvia.     Pt goals: improve quality of life - be able to pick stuff up off floor, go up stairs more easily     Pt reports he has lost 21 lbs on his own so far. Made major changes to his diet.    Saw RD over 20 years ago.    Eating mostly fruits/veggies and chicken. Finding that if he eats high fat food is does not feel good (such as a bratwurst)  Starts day with 1 cup tea.    Fluids: no more than 1 cup milk/day, 2 cups tea, water  Previously was drinking alcohol 4-5 per day 6 days/week. None currently. Also decreased consumption of milk.    Recent recall:  - Apple  - 2 Chicken Wings  - Baby carrots  *Wife concerned that pt is not eating enough and fasting too long. Discussed.     Plans to have 1/2 potatos, mixed veggies, and chicken for dinner tonight.     Physical Activity:  Using 10 lb weight in arms when watching TV  Up/down stairs a few times per day    Nutrition Prescription  Recommended energy/nutrient modification.    Nutrition Diagnosis    Obesity r/t positive energy balance aeb BMI >30.    Nutrition Intervention  Reviewed current dietary habits and pts history   Discussed long-term goals pt hopes to accomplish in " RD appointments  Answered pt questions  Coordination of care   Nutrition education   AVS and handouts via [employer]    Patient demonstrates understanding.    Expected Engagement: good    Nutrition Goals  1) Continue with no alcohol at this time    2) Water as main beverage choice    3) Use the Plate method plan for general guidance on getting balanced meals and general portion sizes (see link below for picture/more information)                 Make 1/2 your plate non starchy vegetables (cauliflower, broccoli, asparagus, [place] sprouts, lettuce, carrots, for example).                3+ oz lean protein sources (salmon/skinless chicken/turkey breast/pork loin/lean cuts of beef/ or non-animal protein such as black, kidney or saunders beans, tofu/edamame/tempeh)     (Note: 3 oz = deck of cards size and 1/4 cup = 1 oz of a protein food, so if you have beans/ground meats or ground soy crumbles then it would be 3/4 cup to equal 3 oz per meal).                 1/2 to 1 cup carbohydrate choices such as whole grain starches or starchy vegetables or fruit. For example: quinoa, brown rice, barley, potatoes, sweet potatoes, winter squash, peas, corn, or fruit.                 Choose ~0-2 added fat servings at a meal (avocados, nut butters, nuts or seeds, olive oil, vegetable oils).    Keep up the great work!    Plate Method For Diabetes   https://www.diabetesfoodhub.org/articles/what-is-the-diabetes-plate-method.html     Protein Sources   [email or web address]       Follow-Up:  PRN    Time spent with patient: 30 minutes.  ANISHA Burciaga, RD, LD

## 2021-11-15 NOTE — PROGRESS NOTES
Demetri is a 66 year old who is being evaluated via a billable video visit.      How would you like to obtain your AVS? MyChart  If the video visit is dropped, the invitation should be resent by: [phone]  Will anyone else be joining your video visit? No    During this virtual visit the patient is located in [place], patient verifies this as the location during the entirety of this visit.     Video-Visit Details    Type of service:  Video Visit    Video call duration: 30 minutes.  I explained the conditions and plans (more than 50% of time was counseling/coordination of weight management).    Originating Location (pt. Location): Home    Distant Location (provider location):  [facility] WE[facility]NAGEMENT CLINIC [place]     Platform used for Video Visit: [employer]

## 2021-11-15 NOTE — PROGRESS NOTES
"    New Medical Weight Management Consult    PATIENT:  Demetri Booth  MRN:         [id number]  :         1955  CARMENZA:         11/15/2021    Dear EVONNE Higgins CNP,    I had the pleasure of seeing your patient, Demetri Booth.  Full intake/assessment done to determine barriers to weight loss success and develop a treatment plan.  Demetri Booth is a 66 year old male interested in treatment of medical problems associated with weight.  His weight today is 352 lbs 0 oz, Body mass index is 51.98 kg/m ., and he has the following co-morbidities:     2021   I have the following health issues associated with obesity: Type II Diabetes, High Blood Pressure, Sleep Apnea   I have the following symptoms associated with obesity: Depression, Lower Extremity Swelling, Groin Rash, Hip Pain     Patient Goals 2021   I am interested in having a healthier weight to diminish current health problems: Yes   I am interested in having a healthier weight in order to prevent future health problems: Yes   I am interested in having a healthier weight in order to have a future surgery: No     Referring Provider 2021   Please name the provider who referred you to Medical Weight Management.  If you do not know, please answer: \"I Don't Know\". Carla WILL       Wt Readings from Last 4 Encounters:   11/15/21 (!) 159.7 kg (352 lb)   10/20/21 (!) 170.8 kg (376 lb 8 oz)   21 (!) 159.2 kg (351 lb)   21 (!) 159.6 kg (351 lb 12.8 oz)     Weight History 2021   How concerned are you about your weight? Somewhat Concerned   Would you describe your weight gain as gradual? Yes   I became overweight: As an Adult   The following factors have contributed to my weight gain:  Change in Schedule, Eating Wrong Types of Food, Eating Too Much, Lack of Exercise   I have tried the following methods to lose weight: Watching Portions or Calories, Fasting   My highest weight since age 17 was: 374   The most weight I have " ever lost was: (lbs) 30   I have the following family history of obesity/being overweight:  I am the only one in my immediate family who is overweight   Has anyone in your family had weight loss surgery? No     Diet Recall 11/13/2021   Glass juice/day 0   Glass milk/day 1   Glass sugary drink/day 0   How many cans/bottles of sugar pop/soda/tea/sports drinks do you drink in a day? 2   Diet drink/day 0   Alcohol 4 or More Times a Week   Drinks/day 3-4     Eating Habits 11/13/2021   Generally, my meals include foods like these: bread, pasta, rice, potatoes, corn, crackers, sweet dessert, pop, or juice. Almost Everyday   Generally, my meals include foods like these: fried meats, brats, burgers, french fries, pizza, cheese, chips, or ice cream. Once a Week   Eat fast food (like McDonal[employer], [employer], Taco Bell). Once a Week   Eat at a buffet or sit-down restaurant. Less Than Weekly   Eat most of my meals in front of the TV or computer. Everyday   Often skip meals, eat at random times, have no regular eating times. Everyday   Rarely sit down for a meal but snack or graze throughout.  Never   Eat extra snacks between meals. Never   Eat most of my food at the end of the day. A Few Times a Week   Eat in the middle of the night or wake up at night to eat. Never   Eat extra snacks to prevent or correct low blood sugar. Never   Eat to prevent acid reflux or stomach pain. Never   Worry about not having enough food to eat. Never   Have you been to the food shelf at least a few times this year? No   I eat when I am depressed. Less Than Weekly   I eat when I am stressed. Less Than Weekly   I eat when I am bored. Less Than Weekly   I eat when I am anxious. Less Than Weekly   I eat when I am happy or as a reward. Less Than Weekly   I feel hungry all the time even if I just have eaten. Less Than Weekly   Feeling full is important to me. Never   I finish all the food on my plate even if I am already full. Less Than Weekly   I can't  resist eating delicious food or walk past the good food/smell. Less Than Weekly   I eat/snack without noticing that I am eating. Never   I eat when I am preparing the meal. Once a Week   I eat more than usual when I see others eating. Less Than Weekly   I have trouble not eating sweets, ice cream, cookies, or chips if they are around the house. Once a Week   I think about food all day. Never   Please list any other foods you crave? Chicken, beef, good bread     Activity/Exercise History 11/13/2021   How much of a typical 12 hour day do you spend sitting? Most of the Day   How much of a typical 12 hour day do you spend lying down? Less Than Half the Day   How much of a typical day do you spend walking/standing? Less Than Half the Day   How many hours (not including work) do you spend on the TV/Video Games/Computer/Tablet/Phone? 6 Hours or More   How many times a week are you active for the purpose of exercise? Never   What keeps you from being more active? Pain, Other   How many total minutes do you spend doing some activity for the purpose of exercising when you exercise? Less Than 15 Minutes     PAST MEDICAL HISTORY:  Past Medical History:   Diagnosis Date     Atrial fibrillation (H)      Cardiomyopathy (H)      Chest pain      Hyperlipidaemia      Hypertension      Migraine     benign migraine     Onychomycosis      SHAHIDA on CPAP      Work/Social History Reviewed With Patient 11/13/2021   My employment status is: Retired   What is your marital status? /In a Relationship   If in a relationship, is your significant other overweight? Yes   Do you have children? Yes   If you have children, are they overweight? No   Who do you live with?  Spouse   Are they supportive of your health goals? Yes   Who does the food shopping?  Spouse     Mental Health History Reviewed With Patient 11/13/2021   Have you ever been physically or sexually abused? No   How often in the past 2 weeks have you felt little interest or pleasure  in doing things? For Several Days   Over the past 2 weeks how often have you felt down, depressed, or hopeless? Not at all       Sleep History Reviewed With Patient 11/13/2021   How many hours do you sleep at night? 8   Do you think that you snore loudly or has anybody ever heard you snore loudly (louder than talking or so loud it can be heard behind a shut door)? Yes   Has anyone seen or heard you stop breathing during your sleep? Yes   Do you often feel tired, fatigued, or sleepy during the day? No   Do you have a TV/Computer in your bedroom? No     MEDICATIONS:   Current Outpatient Medications   Medication Sig Dispense Refill     Acetaminophen (TYLENOL PO) Take 500 mg by mouth every 4 hours as needed        apixaban ANTICOAGULANT (ELIQUIS) 5 MG tablet Take 1 tablet (5 mg) by mouth 2 times daily 180 tablet 3     atorvastatin (LIPITOR) 10 MG tablet Take 1 tablet (10 mg) by mouth daily 90 tablet 3     blood glucose (ACCU-CHEK COMPACT DRUM) test strip Use to test blood sugars 2 times daily. 200 strip 6     blood glucose (NO BRAND SPECIFIED) lancets standard Use to test blood sugar 2 times daily or as directed. 100 lancet 1     blood glucose (NO BRAND SPECIFIED) test strip Use to test blood sugar 2 times daily or as directed. 100 strip 1     blood glucose (ONE TOUCH DELICA) lancing device Device to be used with lancets. 1 each 0     blood glucose monitoring (ONE TOUCH ULTRA 2) meter device kit Use to test blood sugar 2 times daily or as directed. 1 kit 0     blood glucose monitoring (SOFTCLIX) lancets Use to test blood sugar 2 times daily. 200 each 6     clobetasol (TEMOVATE) 0.05 % external cream APPLY TOPICALLY TWICE DAILY AS NEEDED FOR UP TO TWO WEEKS, THEN INTERMITTENLY. 30 g 1     furosemide (LASIX) 40 MG tablet TAKE 1 TABLET BY MOUTH EVERY DAY 90 tablet 2     glimepiride (AMARYL) 2 MG tablet Take 1 tablet (2 mg) by mouth daily TAKE 1 TABLET BY MOUTH EVERY DAY BEFORE BREAKFAST 180 tablet 0     lisinopril  "(ZESTRIL) 5 MG tablet TAKE 1 TABLET BY MOUTH TWICE A  tablet 1     metFORMIN (GLUCOPHAGE) 1000 MG tablet Take 1 tablet (1,000 mg) by mouth 2 times daily (with meals) 180 tablet 1     metoprolol tartrate (LOPRESSOR) 100 MG tablet Take 1 tablet (100 mg) by mouth 3 times daily 270 tablet 3     multivitamin, therapeutic with minerals (THERA-VIT-M) TABS Take 1 tablet by mouth daily.       order for DME Equipment being ordered: CPAP 1 Device 0     order for DME Equipment being ordered: JOBST compression stockings knee high 20-30 mm compression 2 Device 1     vitamin D3 (CHOLECALCIFEROL) 50 mcg (2000 units) tablet Take 1 tablet by mouth daily       Semaglutide (RYBELSUS) 7 MG TABS Take 7 mg by mouth daily (Patient not taking: Reported on 10/20/2021) 30 tablet 1     ALLERGIES:   No Known Allergies    PHYSICAL EXAM:  Ht 1.753 m (5' 9\")   Wt (!) 159.7 kg (352 lb)   BMI 51.98 kg/m     A & O x 3  HEENT: NCAT, mucous membranes moist  Respirations unlabored  Location of obesity: Mixed Obesity    ASSESSMENT:  Demetri is a patient with mature onset super morbid obesity without significant element of familial/genetic influence and with current health consequences. He does need aggressive weight loss plan due to Type II Diabetes, High Blood Pressure, Sleep Apnea. Has recently lost 21 lb by smaller portions and diuretic.    Demetri Booth eats a high carb diet, eats fast food once or more per week and has a disorganized meal pattern. Has changed to fruits,veg and lean protein, cut out snacks. Stopped drinking ETOH. Feels appetite has dropped off with these changes. Switched from sugar to Truvia.    His problem is complicated by strong craving/reward pathways    His ability to lose weight is impacted by physical impairment and lack of confidence.    PLAN:    Volumetrics eating plan  Meal planning    Diabetes   Ancillary testing:  N/A. Frequent home glucose monitoring with report to nurse as normal weight decreases.   Food Plan:  " "Low carbohydrate.  Activity Plan:  Activity journal.  Supplementary:  N/A.  Medication: The patient will begin medication in pursuit of improved medical status as influenced by body weight. He will start Semaglutide.  There is a mutual understanding of the goals and risks of this therapy. The patient is in agreement. He is educated on dosage regimen and possible side effects.    RTC:    12 weeks.  Video call duration: 30 minutes.  I explained the conditions and plans (more than 50% of time was counseling/coordination of weight management).    Sincerely,  Nolan Mora MD    The patient was evaluated via a billable video visit and notified \"This visit will be via video call between you and your physician. If lab work is needed we can place an order and you can later stop by the lab. Video visits are billed at different rates depending on your insurance coverage.  Please reach out to your insurance provider with any questions. If the physician feels a video visit is not appropriate, you will not be charged for this service.\" Patient gave verbal consent for Video visit and would you like to obtain AVS by [employer].      "

## 2021-11-19 ENCOUNTER — TELEPHONE (OUTPATIENT)
Dept: ENDOCRINOLOGY | Facility: CLINIC | Age: 66
End: 2021-11-19
Payer: COMMERCIAL

## 2021-11-19 DIAGNOSIS — E11.65 TYPE 2 DIABETES MELLITUS WITH HYPERGLYCEMIA, WITHOUT LONG-TERM CURRENT USE OF INSULIN (H): ICD-10-CM

## 2021-11-19 NOTE — TELEPHONE ENCOUNTER
[facility] has questions about the Ozempic prescription directions    [phone]

## 2021-11-19 NOTE — TELEPHONE ENCOUNTER
New Rx sent in with updated instructions. CPA with Dr. Mora.     Lauren Bloch, PharmD  Medication Therapy Management Pharmacist   [facility]

## 2021-11-28 ENCOUNTER — PRE VISIT (OUTPATIENT)
Dept: UROLOGY | Facility: CLINIC | Age: 66
End: 2021-11-28
Payer: COMMERCIAL

## 2021-11-29 ENCOUNTER — OFFICE VISIT (OUTPATIENT)
Dept: UROLOGY | Facility: CLINIC | Age: 66
End: 2021-11-29
Attending: NURSE PRACTITIONER
Payer: COMMERCIAL

## 2021-11-29 ENCOUNTER — PRE VISIT (OUTPATIENT)
Dept: UROLOGY | Facility: CLINIC | Age: 66
End: 2021-11-29

## 2021-11-29 VITALS — BODY MASS INDEX: 45.1 KG/M2 | HEIGHT: 70 IN | WEIGHT: 315 LBS

## 2021-11-29 DIAGNOSIS — N48.83 ACQUIRED BURIED PENIS: ICD-10-CM

## 2021-11-29 DIAGNOSIS — N50.89 SCROTAL SWELLING: Primary | ICD-10-CM

## 2021-11-29 PROCEDURE — 99203 OFFICE O/P NEW LOW 30 MIN: CPT | Performed by: PHYSICIAN ASSISTANT

## 2021-11-29 ASSESSMENT — MIFFLIN-ST. JEOR: SCORE: 2347.29

## 2021-11-29 ASSESSMENT — PAIN SCALES - GENERAL: PAINLEVEL: NO PAIN (0)

## 2021-11-29 NOTE — CONFIDENTIAL NOTE
Reason for visit: consult scrotal edema     Relevant information: difficulty controlling stream    Records/imaging/labs/orders: in epic    Pt called: n/a    At Rooming: standard
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
## 2021-11-29 NOTE — TELEPHONE ENCOUNTER
Reason for visit: Consult     Relevant information: discuss buried penis repair    Records/imaging/labs/orders: in EPIC    Pt called: no    At Rooming: normal

## 2021-11-29 NOTE — PATIENT INSTRUCTIONS
UROLOGY CLINIC VISIT PATIENT INSTRUCTIONS    - Schedule in person appointment with Dr. Lynn for evaluation of buried penis.     If you have any issues, questions or concerns in the meantime, do not hesitate to contact us at [phone] or via [employer].     It was a pleasure meeting with you today.  Thank you for allowing me and my team the privilege of caring for you today.  YOU are the reason we are here, and I truly hope we provided you with the excellent service you deserve.  Please let us know if there is anything else we can do for you so that we can be sure you are leaving completely satisfied with your care experience.    Hien George PA-C  Department of Urology

## 2021-11-29 NOTE — NURSING NOTE
"Chief Complaint   Patient presents with     Consult     scrotal edema       Height 1.765 m (5' 9.5\"), weight (!) 157.4 kg (347 lb). Body mass index is 50.51 kg/m .    Patient Active Problem List   Diagnosis     Hypercholesterolemia     Hypertension goal BP (blood pressure) < 130/80     Overweight BMI 50-55     Arrhythmia     Atrial fibrillation with RVR (H)     Atrial fibrillation (H)     Essential hypertension, benign     Onychomycosis     Sleep apnea     Chest pain     Hyperlipidemia     Cardiomyopathy (H)     ACP (advance care planning)     DM (diabetes mellitus) type II uncontrolled, periph vascular disorder (H)       No Known Allergies    Current Outpatient Medications   Medication Sig Dispense Refill     Acetaminophen (TYLENOL PO) Take 500 mg by mouth every 4 hours as needed        apixaban ANTICOAGULANT (ELIQUIS) 5 MG tablet Take 1 tablet (5 mg) by mouth 2 times daily 180 tablet 3     atorvastatin (LIPITOR) 10 MG tablet Take 1 tablet (10 mg) by mouth daily 90 tablet 3     blood glucose (ACCU-CHEK COMPACT DRUM) test strip Use to test blood sugars 2 times daily. 200 strip 6     blood glucose (NO BRAND SPECIFIED) lancets standard Use to test blood sugar 2 times daily or as directed. 100 lancet 1     blood glucose (NO BRAND SPECIFIED) test strip Use to test blood sugar 2 times daily or as directed. 100 strip 1     blood glucose (ONE TOUCH DELICA) lancing device Device to be used with lancets. 1 each 0     blood glucose monitoring (ONE TOUCH ULTRA 2) meter device kit Use to test blood sugar 2 times daily or as directed. 1 kit 0     blood glucose monitoring (SOFTCLIX) lancets Use to test blood sugar 2 times daily. 200 each 6     clobetasol (TEMOVATE) 0.05 % external cream APPLY TOPICALLY TWICE DAILY AS NEEDED FOR UP TO TWO WEEKS, THEN INTERMITTENLY. 30 g 1     furosemide (LASIX) 40 MG tablet TAKE 1 TABLET BY MOUTH EVERY DAY 90 tablet 2     glimepiride (AMARYL) 2 MG tablet Take 1 tablet (2 mg) by mouth daily TAKE 1 " TABLET BY MOUTH EVERY DAY BEFORE BREAKFAST 180 tablet 0     lisinopril (ZESTRIL) 5 MG tablet TAKE 1 TABLET BY MOUTH TWICE A  tablet 1     metFORMIN (GLUCOPHAGE) 1000 MG tablet Take 1 tablet (1,000 mg) by mouth 2 times daily (with meals) 180 tablet 1     metoprolol tartrate (LOPRESSOR) 100 MG tablet Take 1 tablet (100 mg) by mouth 3 times daily 270 tablet 3     multivitamin, therapeutic with minerals (THERA-VIT-M) TABS Take 1 tablet by mouth daily.       order for DME Equipment being ordered: CPAP 1 Device 0     order for DME Equipment being ordered: JOBST compression stockings knee high 20-30 mm compression 2 Device 1     semaglutide (OZEMPIC) 2 MG/1.5ML SOPN pen Inject 0.25 mg Subcutaneous every 7 days for 28 days, THEN 0.5 mg every 7 days. Updated Ozempic Rx, please discontinue the 11/15 Ozempic rx 1.5 mL 4     vitamin D3 (CHOLECALCIFEROL) 50 mcg (2000 units) tablet Take 1 tablet by mouth daily         Social History     Tobacco Use     Smoking status: Former Smoker     Quit date: 1970     Years since quittin.2     Smokeless tobacco: Never Used   Substance Use Topics     Alcohol use: Yes     Alcohol/week: 0.0 standard drinks     Comment: 4 drinks day, mixed drinks     Drug use: No       Belkis Peters  2021  8:10 AM
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
## 2021-12-15 ENCOUNTER — OFFICE VISIT (OUTPATIENT)
Dept: UROLOGY | Facility: CLINIC | Age: 66
End: 2021-12-15
Payer: COMMERCIAL

## 2021-12-15 VITALS — DIASTOLIC BLOOD PRESSURE: 80 MMHG | SYSTOLIC BLOOD PRESSURE: 138 MMHG

## 2021-12-15 DIAGNOSIS — N52.9 ERECTILE DYSFUNCTION, UNSPECIFIED ERECTILE DYSFUNCTION TYPE: Primary | ICD-10-CM

## 2021-12-15 PROCEDURE — 99214 OFFICE O/P EST MOD 30 MIN: CPT | Performed by: UROLOGY

## 2021-12-15 RX ORDER — SILDENAFIL 100 MG/1
TABLET, FILM COATED ORAL
Qty: 30 TABLET | Refills: 11 | Status: SHIPPED | OUTPATIENT
Start: 2021-12-15

## 2021-12-15 NOTE — PATIENT INSTRUCTIONS
Follow up as needed.    It was a pleasure meeting with you today.  Thank you for allowing me and my team the privilege of caring for you today.  YOU are the reason we are here, and I truly hope we provided you with the excellent service you deserve.  Please let us know if there is anything else we can do for you so that we can be sure you are leaving completely satisfied with your care experience.        Rika Mcgee, [facility]

## 2021-12-15 NOTE — LETTER
12/15/2021       RE: Demetri Booth  [address]  [place] [facility] [address]     Dear Colleague,    Thank you for referring your patient, Demetri Booth, to the [facility] [place] at [facility]. Please see a copy of my visit note below.    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  Lost 40 lbs over last few months.  Mostly gave up alcohol.  Never tried PDE5  His wife is very helpful in his genital hygiene.  He can still reduce his foreskin and expose his glans.  He has some chronic lymphedema of scrotum and lower abdomen.  However, his scrotal edema has resolved.    Exam:  /80   NAD  Abdomen soft  His lower abdomen has lost its waistline (single bubble) and his lower abdomen is palpably edematous.  His scrotum has no edema/enlargement currently.  Penis is somewhat buried but able to expose glans. Penile shaft skin is spared and there are no current wounds.    Review of Labs:  The following labs were reviewed by me and discussed with the patient:  Cr 0.81  HbA1c 9.1    Assessment & Plan     Buried penis though no phimosis and continues to lose weight.  I do not currently recommend surgery at this time given ongoing weight loss and fluctuating lymphedema.    He also has ED and never tried meds -     Trial of Viagra 50-100mg PRN  Risks, benefits, side effects discussed.    Followup if continues to lose weight and seeks buried penis repair.      Phi Lynn MD  Reconstructive Urology  [facility]

## 2021-12-15 NOTE — NURSING NOTE
Chief Complaint   Patient presents with     Follow Up       Blood pressure 138/80. There is no height or weight on file to calculate BMI.    Patient Active Problem List   Diagnosis     Hypercholesterolemia     Hypertension goal BP (blood pressure) < 130/80     Overweight BMI 50-55     Arrhythmia     Atrial fibrillation with RVR (H)     Atrial fibrillation (H)     Essential hypertension, benign     Onychomycosis     Sleep apnea     Chest pain     Hyperlipidemia     Cardiomyopathy (H)     ACP (advance care planning)     DM (diabetes mellitus) type II uncontrolled, periph vascular disorder (H)       No Known Allergies    Current Outpatient Medications   Medication Sig Dispense Refill     Acetaminophen (TYLENOL PO) Take 500 mg by mouth every 4 hours as needed        apixaban ANTICOAGULANT (ELIQUIS) 5 MG tablet Take 1 tablet (5 mg) by mouth 2 times daily 180 tablet 3     atorvastatin (LIPITOR) 10 MG tablet Take 1 tablet (10 mg) by mouth daily 90 tablet 3     blood glucose (ACCU-CHEK COMPACT DRUM) test strip Use to test blood sugars 2 times daily. 200 strip 6     blood glucose (NO BRAND SPECIFIED) lancets standard Use to test blood sugar 2 times daily or as directed. 100 lancet 1     blood glucose (NO BRAND SPECIFIED) test strip Use to test blood sugar 2 times daily or as directed. 100 strip 1     blood glucose (ONE TOUCH DELICA) lancing device Device to be used with lancets. 1 each 0     blood glucose monitoring (ONE TOUCH ULTRA 2) meter device kit Use to test blood sugar 2 times daily or as directed. 1 kit 0     blood glucose monitoring (SOFTCLIX) lancets Use to test blood sugar 2 times daily. 200 each 6     clobetasol (TEMOVATE) 0.05 % external cream APPLY TOPICALLY TWICE DAILY AS NEEDED FOR UP TO TWO WEEKS, THEN INTERMITTENLY. 30 g 1     furosemide (LASIX) 40 MG tablet TAKE 1 TABLET BY MOUTH EVERY DAY 90 tablet 2     glimepiride (AMARYL) 2 MG tablet Take 1 tablet (2 mg) by mouth daily TAKE 1 TABLET BY MOUTH EVERY DAY  BEFORE BREAKFAST 180 tablet 0     lisinopril (ZESTRIL) 5 MG tablet TAKE 1 TABLET BY MOUTH TWICE A  tablet 1     metFORMIN (GLUCOPHAGE) 1000 MG tablet Take 1 tablet (1,000 mg) by mouth 2 times daily (with meals) 180 tablet 1     metoprolol tartrate (LOPRESSOR) 100 MG tablet Take 1 tablet (100 mg) by mouth 3 times daily 270 tablet 3     multivitamin, therapeutic with minerals (THERA-VIT-M) TABS Take 1 tablet by mouth daily.       order for DME Equipment being ordered: CPAP 1 Device 0     order for DME Equipment being ordered: JOBST compression stockings knee high 20-30 mm compression 2 Device 1     semaglutide (OZEMPIC) 2 MG/1.5ML SOPN pen Inject 0.25 mg Subcutaneous every 7 days for 28 days, THEN 0.5 mg every 7 days. Updated Ozempic Rx, please discontinue the 11/15 Ozempic rx 1.5 mL 4     vitamin D3 (CHOLECALCIFEROL) 50 mcg (2000 units) tablet Take 1 tablet by mouth daily         Social History     Tobacco Use     Smoking status: Former Smoker     Quit date: 1970     Years since quittin.2     Smokeless tobacco: Never Used   Substance Use Topics     Alcohol use: Yes     Alcohol/week: 0.0 standard drinks     Comment: 4 drinks day, mixed drinks     Drug use: No       Bud Solano  12/15/2021  10:16 AM

## 2021-12-15 NOTE — PROGRESS NOTES
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  Lost 40 lbs over last few months.  Mostly gave up alcohol.  Never tried PDE5  His wife is very helpful in his genital hygiene.  He can still reduce his foreskin and expose his glans.  He has some chronic lymphedema of scrotum and lower abdomen.  However, his scrotal edema has resolved.    Exam:  /80   NAD  Abdomen soft  His lower abdomen has lost its waistline (single bubble) and his lower abdomen is palpably edematous.  His scrotum has no edema/enlargement currently.  Penis is somewhat buried but able to expose glans. Penile shaft skin is spared and there are no current wounds.    Review of Labs:  The following labs were reviewed by me and discussed with the patient:  Cr 0.81  HbA1c 9.1    Assessment & Plan     Buried penis though no phimosis and continues to lose weight.  I do not currently recommend surgery at this time given ongoing weight loss and fluctuating lymphedema.    He also has ED and never tried meds -     Trial of Viagra 50-100mg PRN  Risks, benefits, side effects discussed.    Followup if continues to lose weight and seeks buried penis repair.      Phi Lynn MD  Reconstructive Urology  [facility]

## 2021-12-23 ENCOUNTER — VIRTUAL VISIT (OUTPATIENT)
Dept: PHARMACY | Facility: CLINIC | Age: 66
End: 2021-12-23
Attending: INTERNAL MEDICINE
Payer: COMMERCIAL

## 2021-12-23 DIAGNOSIS — E66.01 CLASS 3 SEVERE OBESITY DUE TO EXCESS CALORIES WITH SERIOUS COMORBIDITY IN ADULT, UNSPECIFIED BMI (H): ICD-10-CM

## 2021-12-23 DIAGNOSIS — I10 ESSENTIAL HYPERTENSION, BENIGN: ICD-10-CM

## 2021-12-23 DIAGNOSIS — M54.9 BACK PAIN, UNSPECIFIED BACK LOCATION, UNSPECIFIED BACK PAIN LATERALITY, UNSPECIFIED CHRONICITY: ICD-10-CM

## 2021-12-23 DIAGNOSIS — L40.9 PSORIASIS: ICD-10-CM

## 2021-12-23 DIAGNOSIS — E66.813 CLASS 3 SEVERE OBESITY DUE TO EXCESS CALORIES WITH SERIOUS COMORBIDITY IN ADULT, UNSPECIFIED BMI (H): ICD-10-CM

## 2021-12-23 DIAGNOSIS — N50.89 SCROTAL EDEMA: ICD-10-CM

## 2021-12-23 DIAGNOSIS — I48.91 ATRIAL FIBRILLATION, UNSPECIFIED TYPE (H): ICD-10-CM

## 2021-12-23 DIAGNOSIS — E78.5 HYPERLIPIDEMIA, UNSPECIFIED HYPERLIPIDEMIA TYPE: ICD-10-CM

## 2021-12-23 DIAGNOSIS — R60.0 LOWER EXTREMITY EDEMA: ICD-10-CM

## 2021-12-23 DIAGNOSIS — N52.9 ERECTILE DYSFUNCTION, UNSPECIFIED ERECTILE DYSFUNCTION TYPE: ICD-10-CM

## 2021-12-23 DIAGNOSIS — Z78.9 TAKES DIETARY SUPPLEMENTS: ICD-10-CM

## 2021-12-23 PROCEDURE — 99606 MTMS BY PHARM EST 15 MIN: CPT | Performed by: PHARMACIST

## 2021-12-23 NOTE — PATIENT INSTRUCTIONS
Recommendations from today's MTM visit:                                                         Congratulations on your progress thus far with your weight loss and great nutrition/activity changes!     1. Minimize alcohol intake.     2. Contnue Ozempic 0.5 mg weekly for now.     Follow-up: Return in 4 weeks (on 1/20/2022).    It was great to speak with you today.  I value your experience and would be very thankful for your time with providing feedback on our clinic survey. You may receive a survey via email or text message in the next few days.       My Clinical Pharmacist's contact information:                                                      Please feel free to contact me with any questions or concerns you have.      Lauren Bloch, PharmD  Medication Therapy Management Pharmacist   [facility] [place]

## 2021-12-23 NOTE — Clinical Note
Siddhartha - I saw Demetri for MTM visit today, he was referred to me by Dr. Mora at the [facility] to follow up on Ozempic start. Going well, blood sugars are trending down. Has lost an additional 4.8% of body weight from Ozempic start and tolerating well. Will follow up with him again in 1 month. Thanks! Anitra MTM Pharmacist

## 2021-12-23 NOTE — Clinical Note
Continues to progress with weight loss, is down another 15 lb in the last month with starting Ozempic, no s/e so will continue on this dose for now and will check in with him in 1 month. Anitra HOWARD

## 2021-12-23 NOTE — PROGRESS NOTES
Medication Therapy Management (MTM) Encounter    ASSESSMENT:                            Medication Adherence/Access: No issues identified      Type 2 Diabetes/Obesity: A1c not at goal of <7% but is close. AM fasting not at goal  mg/dL but again close, would benefit from A1c repeat in 1-2 months to check in on status since starting Ozempic. Would benefit from continuing current Ozempic dose at this time. Would also benefit from minimizing alcohol intake due to last liver labs and to help to lower blood sugars.     Afib: Stable. Can ask about HR at follow up with at home blood pressure monitor.     Hypertension: last blood pressure at goal <140/90 mmHg.     Hyperlipidemia: Stable. LDL at goal <100. Patient is on statin which is indicated based on 2019 ACC/AHA guidelines for lipid management.      Scrotal Edema/Lower Extremity Swelling: stable.     Erectile Dysfunction: Unknown, can check in at follow up. Discussed food effects and potential side effects.     Back Pain: Stable.     Supplements: Stable. Could consider vitamin D level in future.     Psoriasis: Stable.     PLAN:                            1. Minimize alcohol intake.     2. Contnue Ozempic 0.5 mg weekly for now.     3. Labs in future: A1c, CMP, lipid, vitamin D?    Follow-up: 1 month with MTM pharmacist and 2 months with Dr. Mora    SUBJECTIVE/OBJECTIVE:                          Demetri Booth is a 66 year old male contacted via secure video for a follow-up visit. He was referred to me from Dr. Mora.  Today's visit is a follow-up MTM visit from 2/2021. Was previously seen by Melanie Rosales Medication Therapy Management (MTM) Pharmacist at the patient's primary care provider clinic.      Reason for visit: Ozempic start follow up.    Allergies/ADRs: Reviewed in chart  Past Medical History: Reviewed in chart  Tobacco: He reports that he quit smoking about 51 years ago. He has never used smokeless tobacco.  Alcohol: 4 glasses of wine per  day    Medication Adherence/Access: no issues reported    Type 2 Diabetes/Obesity:    Metformin 1000 mg twice daily   Glimepiride 2 mg daily before breakfast   Ozempic 0.5 mg weekly, 1st injection      Was seen by Dr. Mora on 11/15/2021 and was starting on Ozempic. Patient reports that he had lost 30+ lb from dietary changes and starting lasix due to lower extremity swelling. No issues with injecting, wife helps with injection. Had been on other mediations Patient is not experiencing side effects. Reports smaller portions, feeling greater fullness. Not hungry all the time like historical.   Failed medications: Rybelsus - gastrointestinal intolerance at the 7 mg dose   Blood sugar monitorin-3 time(s) week. Ranges (patient reported): 12/15 @ 8:10 am: 142 mg/dL;  @ 7:59 AM: 131 mg/dL.   Symptoms of low blood sugar? none  Symptoms of high blood sugar? None, finding that thirst has decreased.   Eye exam: up to date, end of 2021. Reports that first time finding signs of cataracts.   Foot exam: up to date  Diet/Exercise: Breakfast: orange + tea and spoonful of sugar;  Snacking on vegetables - celery and carrots. Dessert: apple with PB. Has been walking up and down stairs in building more. Does arm exercises when watching TV, 10 lb weight. Does have protein - chicken, tries to not eat the skin. Has been getting a lot of gareth candy so has been giving most of that away. He has been given bottles of wine from friends and family so more of this is around the house, has been drinking 3-4 glasses of wine per day. After New Year hopes to have given most of wine away.   Aspirin: Not taking due to taking Eliquis  Statin: Yes: atorvastatin   ACEi/ARB: Yes: lisinopril.   Urine Albumin:   Lab Results   Component Value Date    UMALCR 93.02 (H) 10/20/2021      Lab Results   Component Value Date    A1C 7.0 10/20/2021    A1C 9.1 2021    A1C 7.8 2020    A1C 7.5 2020    A1C 10.0 2020    A1C  6.8 09/18/2019     Weight today: 335 lb   Initial Consult Weight from Starting Ozempic: 352 lb     Wt Readings from Last 4 Encounters:   11/29/21 (!) 347 lb (157.4 kg)   11/15/21 (!) 352 lb (159.7 kg)   10/20/21 (!) 376 lb 8 oz (170.8 kg)   07/14/21 (!) 351 lb (159.2 kg)     Afib: Patient is currently taking Eliquis 5mg twice daily for anticoagulation. Patient reports no current concerns of bruising or bleeding. Patient does not have a history of GI bleed. Problems with bleeding with Xarelto, has resolved with switching to Eliquis.   Patient is also taking Metoprolol 100 mg twice daily. Was prescribed metoprolol three times daily but reports no racing heart so has been taking metoprolol twice daily without issues. Patient reports no current medication side effects. Minor complaints of orthostatic hypotension, but occuring less frequently. Was previously using cane at heaviest, but now with some weight loss he has been able to gain stability and walk more.   Patient does self-monitor blood pressure, but has lost cuff and just found again so no recent readings. He plans to restart and check a couple times per week.    [id number] Score    Date Calculated: 10/20/2021  6:34 PM  [id number] Score: 4       Hypertension:  Lisinopril 5 mg twice daily   Metoprolol 100 mg twice daily       Patient does self-monitor blood pressure, see above. Patient reports no current medication side effects. Patient has been taking lisinopril twice daily for a while and prefers to continue to lior this way. No recent HR on file.   BP Readings from Last 3 Encounters:   12/15/21 138/80   10/20/21 134/74   02/02/21 122/82     Hyperlipidemia:   Atorvastatin 10mg daily.      Patient reports no significant myalgias or other side effects. Historical record showed at one point was out of atorvastatin and difficulty refilling, so restarted 10/20/21.   The 10-year ASCVD risk score (Nola ROBERSON Jr., et al., 2013) is: 30.7%    Values used to calculate the  "score:      Age: 66 years      Sex: Male      Is Non- : No      Diabetic: Yes      Tobacco smoker: No      Systolic Blood Pressure: 138 mmHg      Is BP treated: Yes      HDL Cholesterol: 47 mg/dL      Total Cholesterol: 158 mg/dL  Recent Labs   Lab Test 10/20/21  1442 05/20/20  1037 01/04/16  0808 08/18/14  0836 02/05/14  1008   CHOL 158 142   < > 163 174   HDL 47 51   < > 48 43   LDL 96 69   < > 97 113   TRIG 77 111   < > 88 92   CHOLHDLRATIO  --   --   --  3.4 4.0    < > = values in this interval not displayed.     Lab Results   Component Value Date    ALT 53 10/20/2021    ALT 45 02/02/2021     Scrotal Edema/Lower Extremity Edema:   Furosemide 40 mg daily in AM     Recent visit to Urology. Has found the furosemide helpful for scrotal edema and lower extremity swelling. No medication side effects other than he does urinate a lot more the first couple hours after taking.  No current issues of scrotal edema or lower extremity swelling while on medication.     Creatinine   Date Value Ref Range Status   10/20/2021 0.81 0.66 - 1.25 mg/dL Final   02/02/2021 0.75 0.66 - 1.25 mg/dL Final     Potassium   Date Value Ref Range Status   10/20/2021 4.1 3.4 - 5.3 mmol/L Final   02/02/2021 4.1 3.4 - 5.3 mmol/L Final     Erectile Dysfunction:   Viagra  mg as needed sexual activity    Saw Urology 12/15/21 which prescribed trial of viagra. Hasn't tried yet.  Getting from different pharmacy that was cheaper, picking up soon.     Back Pain:   acetaiminophen 500 mg as needed     Not using acetaiminophen very often anymore since told to minimize since his last liver labs. Usually will only have pains if \"over did it\", uses < 1 time every 2 weeks.     Supplements:   Multivitamin daily   Vitamin D 2000 international unit(s) daily     Started vitamin D as he is indoors a lot. No vitamin D level on file.     Psoriasis: using clobetasol as needed which works well if he uses it often enough, does take some breaks " in therapy. Hasn't had to use in a little while as of now. Feels like with weight loss that his psoriasis has somewhat improved.     ----------------      I spent 30 minutes with this patient today. Medication Action Plan completed at 2/9/21 visit with Melanie Rosales PharmD. A copy of the visit note was provided to the patient's referring provider.    The patient was sent via [employer] a summary of these recommendations.     Lauren Bloch, PharmD  Medication Therapy Management Pharmacist   [facility]    Telemedicine Visit Details  Type of service:  Video Conference via [employer]  Start Time: 8:59 AM  End Time: 9:29 AM  Originating Location (patient location): Home  Distant Location (provider location):  [facility]     Medication Therapy Recommendations  No medication therapy recommendations to display

## 2021-12-27 DIAGNOSIS — L40.9 PSORIASIS OF SCALP: ICD-10-CM

## 2021-12-27 DIAGNOSIS — I42.9 CARDIOMYOPATHY, UNSPECIFIED TYPE (H): ICD-10-CM

## 2021-12-27 DIAGNOSIS — N50.89 SCROTAL EDEMA: ICD-10-CM

## 2021-12-27 RX ORDER — FUROSEMIDE 40 MG
TABLET ORAL
Qty: 90 TABLET | Refills: 2 | Status: SHIPPED | OUTPATIENT
Start: 2021-12-27 | End: 2022-07-27

## 2021-12-28 RX ORDER — CLOBETASOL PROPIONATE 0.5 MG/G
CREAM TOPICAL
Qty: 30 G | Refills: 1 | Status: SHIPPED | OUTPATIENT
Start: 2021-12-28 | End: 2022-06-24

## 2021-12-28 NOTE — TELEPHONE ENCOUNTER
Requested Prescriptions   Pending Prescriptions Disp Refills     clobetasol (TEMOVATE) 0.05 % external cream [Pharmacy Med Name: CLOBETASOL 0.05% CREAM] 30 g 1     Sig: APPLY TOPICALLY TWICE DAILY AS NEEDED FOR UP TO TWO WEEKS, THEN INTERMITTENLY.       There is no refill protocol information for this order        Routing refill request to provider for review/approval because:  Drug not on the [facility] refill protocol     Alda Luke RN  [facility]

## 2022-01-20 ENCOUNTER — VIRTUAL VISIT (OUTPATIENT)
Dept: PHARMACY | Facility: CLINIC | Age: 67
End: 2022-01-20
Payer: COMMERCIAL

## 2022-01-20 DIAGNOSIS — E66.813 CLASS 3 SEVERE OBESITY DUE TO EXCESS CALORIES WITH SERIOUS COMORBIDITY IN ADULT, UNSPECIFIED BMI (H): ICD-10-CM

## 2022-01-20 DIAGNOSIS — E66.01 CLASS 3 SEVERE OBESITY DUE TO EXCESS CALORIES WITH SERIOUS COMORBIDITY IN ADULT, UNSPECIFIED BMI (H): ICD-10-CM

## 2022-01-20 PROCEDURE — 99607 MTMS BY PHARM ADDL 15 MIN: CPT | Performed by: PHARMACIST

## 2022-01-20 PROCEDURE — 99605 MTMS BY PHARM NP 15 MIN: CPT | Performed by: PHARMACIST

## 2022-01-20 RX ORDER — SEMAGLUTIDE 1.34 MG/ML
1 INJECTION, SOLUTION SUBCUTANEOUS WEEKLY
Qty: 9 ML | Refills: 3 | Status: SHIPPED | OUTPATIENT
Start: 2022-01-20 | End: 2022-07-06

## 2022-01-20 NOTE — PATIENT INSTRUCTIONS
Recommendations from today's [facility] visit:                                                         1. Finish out the Ozempic 0.5 mg weekly pen that you have. Then after that, Increase Ozempic to 1 mg weekly. Stop glimepiride when going up on Ozempic to 1 mg weekly. New prescription was sent to your pharmacy for the Ozempic 1 mg weekly pen.     Follow-up: Follow up with Dr. Mora ~1 month from now. [phone] to schedule. Then follow up with Lauren Bloch, [facility] pharmacist for 8-10 weeks from now.     It was great to speak with you today.  I value your experience and would be very thankful for your time with providing feedback on our clinic survey. You may receive a survey via email or text message in the next few days.       My Clinical Pharmacist's contact information:                                                      Please feel free to contact me with any questions or concerns you have.      Lauren Bloch, PharmD  Medication Therapy Management Pharmacist   [facility] [place]

## 2022-01-20 NOTE — PROGRESS NOTES
"Medication Therapy Management (MTM) Encounter    ASSESSMENT:                            Medication Adherence/Access: No issues identified    Type 2 Diabetes/Obesity: Patient is not meeting A1c goal of < 7%. Self monitoring of blood glucose is not at goal of fasting  mg/dL. Patient would benefit from GLP-1 agonist (Ozempic) :  increase dose to 1 mg weekly and stopping glimepiride when increasing Ozempic.    PLAN:                            1. Increase Ozempic to 1 mg weekly. Stop glimepiride when going up on Ozempic.     Follow-up: Follow up with Dr. Mora ~1 month from now. [phone] to schedule. Then follow up with Lauren Bloch, MTM pharmacist for 8-10 weeks from now.     SUBJECTIVE/OBJECTIVE:                          Demetri Booth is a 66 year old male contacted via video visit for a follow-up visit.  Today's visit is a follow-up MTM visit from 12/23/2021. First visit of 2022. comprehensive review of medications was last completed 12/23/2021. Medication reconciliation was completed today.     Reason for visit: Blood sugars.    Allergies/ADRs: Reviewed in chart  Past Medical History: Reviewed in chart  Tobacco: He reports that he quit smoking about 51 years ago. He has never used smokeless tobacco.  Alcohol: none.    Medication Adherence/Access: no issues reported    Type 2 Diabetes/Obesity:    Metformin 1000 mg twice daily   Glimepiride 2 mg daily before breakfast   Ozempic 0.5 mg weekly      Was seen by Dr. Mora on 11/15/2021 and was starting on Ozempic. Reports that he isn't feeling as much of that added fullness as he thought he was doing before. No medication side effects. Reports that historically he feels \"yucky\" when gets down to 120 mg/dL, but unsure if that was when his A1c was higher. He has cut out alcohol again. Reports he had regained some weight during holidays and now back to his previous weight from last month. He reports he doesn't need refill on test strips.   Failed medications: " Rybelsus - gastrointestinal intolerance at the 7 mg dose.  Blood sugar monitorin-3 time(s) week. Ranges (patient reported): fastins. Yesterday was 147 mg/dL.    Symptoms of low blood sugar? none  Symptoms of high blood sugar? None, finding that thirst has decreased.   Eye exam: up to date, end of 2021. Reports that first time finding signs of cataracts.   Foot exam: up to date  Diet/Exercise: Reports over the holidays he gained weight due to indulging. He reports that he was eating larger portions and indulging in sweets. Breakfast: orange; lunch: varies. Dinner: meat + mixed vegetables. Snack: apple. Trying to avoid bread. He just started using elliptical again, 5 minutes at a time. He wants to do 3 separate sessions of this so 15 minutes total daily. Reports that going up and down the stairs has gotten easier. Still doing weights when in front of the TV sitting down. Discussed ensuring getting some protein in with his breakfast in addition to fruit and why that is important to slow spiking of sugars in AM.   Aspirin: Not taking due to taking Eliquis  Statin: Yes: atorvastatin   ACEi/ARB: Yes: lisinopril.   Urine Albumin:   Lab Results   Component Value Date    UMALCR 93.02 (H) 10/20/2021      Lab Results   Component Value Date    A1C 7.0 10/20/2021    A1C 9.1 2021    A1C 7.8 2020    A1C 7.5 2020    A1C 10.0 2020    A1C 6.8 2019     Weight today: 335 lb   Initial Consult Weight from Starting Ozempic: 352 lb     Wt Readings from Last 4 Encounters:   21 (!) 347 lb (157.4 kg)   11/15/21 (!) 352 lb (159.7 kg)   10/20/21 (!) 376 lb 8 oz (170.8 kg)   21 (!) 351 lb (159.2 kg)     ----------------      I spent 20 minutes with this patient today. (an extra 15 minutes was spent creating the Medication Action Plan)All changes were made via collaborative practice agreement with Dr. Mora. A copy of the visit note was provided to the patient's provider(s).    The  patient was sent via [employer] a summary of these recommendations.     Lauren Bloch, PharmD, BCACP   Medication Therapy Management Pharmacist   [facility]    Telemedicine Visit Details  Type of service:  Video Conference via AmWell  Start Time: 9:00 AM  End Time: 9:20 AM  Originating Location (patient location): Home  Distant Location (provider location):  [facility]     Medication Therapy Recommendations  DM (diabetes mellitus) type II uncontrolled, periph vascular disorder (H)    Current Medication: semaglutide (OZEMPIC) 2 MG/1.5ML SOPN pen (Discontinued)   Rationale: Dose too low - Dosage too low - Effectiveness   Recommendation: Increase Dose - Ozempic (1 MG/DOSE) 4 MG/3ML Sopn - stop glimepiride   Status: Accepted per CPA

## 2022-01-20 NOTE — LETTER
January 20, 2022  Demetri LUCHO Booth  [address]  [place] [facility] [address]    Dear  Emmy, ANGEL [facility] [place]        Thank you for talking with me on Jan 20, 2022 about your health and medications. As a follow-up to our conversation, I have included two documents:      1. Your Recommended To-Do List has steps you should take to get the best results from your medications.  2. Your Medication List will help you keep track of your medications and how to take them.    If you want to talk about these documents, please call Lauren T. Bloch, RPH at phone: [phone], Monday-Friday 8-4:30pm.    I look forward to working with you and your doctors to make sure your medications work well for you.    Sincerely,    Lauren T. Bloch, RPH

## 2022-01-20 NOTE — Clinical Note
LINDSAY - T2DM, you last saw him November '21. Increased Ozempic to 1mg weekly today and stopped glimepiride 2 mg daily. AM fastings 140s as of now. A1c could be repeated in near future. He will see you in 4-6 weeks. Anitra HOWARD

## 2022-01-20 NOTE — LETTER
"Recommended To-Do List      Prepared on: 1/20/2022     You can get the best results from your medications by completing the items on this \"To-Do List.\"      Bring your To-Do List when you go to your doctor. And, share it with your family or caregivers.    My To-Do List:  What we talked about: What I should do:   Your medication dosage being too low    Increase your dosage of semaglutide (OZEMPIC) to 1 mg weekly. Stop glimepiride when you go up on the Ozempic dose.           What we talked about: What I should do:                     "

## 2022-01-20 NOTE — LETTER
_  Medication List        Prepared on: 1/20/2022     Bring your Medication List when you go to the doctor, hospital, or   emergency room. And, share it with your family or caregivers.     Note any changes to how you take your medications.  Cross out medications when you no longer use them.    Medication How I take it Why I use it Prescriber   Acetaminophen (TYLENOL PO) Take 500 mg by mouth every 4 hours as needed  Pain Patient Reported   apixaban ANTICOAGULANT (ELIQUIS) 5 MG tablet Take 1 tablet (5 mg) by mouth 2 times daily Atrial Fibrillation  EVONNE Cade CNP   atorvastatin (LIPITOR) 10 MG tablet Take 1 tablet (10 mg) by mouth daily Hypercholesterolemia EVONNE Paul CNP   clobetasol (TEMOVATE) 0.05 % external cream APPLY TOPICALLY TWICE DAILY AS NEEDED FOR UP TO TWO WEEKS, THEN INTERMITTENLY. Psoriasis of scalp Chato Willis MD   furosemide (LASIX) 40 MG tablet TAKE 1 TABLET BY MOUTH EVERY DAY Cardiomyopathy, unspecified type (H); Scrotal Edema Mel Batista MD   lisinopril (ZESTRIL) 5 MG tablet TAKE 1 TABLET BY MOUTH TWICE A DAY Hypertension Goal BP (Blood Pressure) < 130/80 Sancho Holliday MD   metFORMIN (GLUCOPHAGE) 1000 MG tablet Take 1 tablet (1,000 mg) by mouth 2 times daily (with meals) Type 2 diabetes mellitus without complication, without long-term current use of insulin (H) Benita Clinton PA-C   metoprolol tartrate (LOPRESSOR) 100 MG tablet Take 1 tablet (100 mg) by mouth 3 times daily Hypertension Goal BP (Blood Pressure) < 130/80 EVONNE Cade CNP   multivitamin, therapeutic with minerals (THERA-VIT-M) TABS Take 1 tablet by mouth daily. General Health  Patient Reported    order for DME Equipment being ordered: JOBST compression stockings knee high 20-30 mm compression Cardiomyopathy, unspecified type (H) Cheko Osborn MD   order for DME Equipment being ordered: CPAP Obstructive Sleep Apnea Syndrome Cheko Osborn MD    Semaglutide, 1 MG/DOSE, (OZEMPIC, 1 MG/DOSE,) 4 MG/3ML SOPN Inject 1 mg Subcutaneous once a week DM (Diabetes Mellitus) Type II Uncontrolled, Periph Vascular Disorder (H); Class 3 severe obesity due to excess calories with serious comorbidity in adult, unspecified BMI (H) Nolan Mora MD   sildenafil (VIAGRA) 100 MG tablet Use 1/2 to 1 pill as needed for sexual activity Erectile dysfunction, unspecified erectile dysfunction type Phi Lynn MD   vitamin D3 (CHOLECALCIFEROL) 50 mcg (2000 units) tablet Take 1 tablet by mouth daily General Health  Patient Reported         Add new medications, over-the-counter drugs, herbals, vitamins, or  minerals in the blank rows below.    Medication How I take it Why I use it Prescriber                          Allergies:      No Known Allergies        Side effects I have had:              Other Information:             My notes and questions:

## 2022-02-11 ENCOUNTER — OFFICE VISIT (OUTPATIENT)
Dept: CARDIOLOGY | Facility: CLINIC | Age: 67
End: 2022-02-11
Payer: COMMERCIAL

## 2022-02-11 VITALS
HEIGHT: 69 IN | OXYGEN SATURATION: 100 % | DIASTOLIC BLOOD PRESSURE: 80 MMHG | HEART RATE: 69 BPM | BODY MASS INDEX: 46.65 KG/M2 | SYSTOLIC BLOOD PRESSURE: 120 MMHG | WEIGHT: 315 LBS

## 2022-02-11 DIAGNOSIS — I10 HYPERTENSION GOAL BP (BLOOD PRESSURE) < 130/80: ICD-10-CM

## 2022-02-11 DIAGNOSIS — I48.21 PERMANENT ATRIAL FIBRILLATION (H): Primary | ICD-10-CM

## 2022-02-11 PROCEDURE — 93000 ELECTROCARDIOGRAM COMPLETE: CPT | Performed by: INTERNAL MEDICINE

## 2022-02-11 PROCEDURE — 99214 OFFICE O/P EST MOD 30 MIN: CPT | Performed by: INTERNAL MEDICINE

## 2022-02-11 RX ORDER — METOPROLOL TARTRATE 100 MG
100 TABLET ORAL 2 TIMES DAILY
Qty: 180 TABLET | Refills: 3 | COMMUNITY
Start: 2022-02-11 | End: 2022-02-17

## 2022-02-11 ASSESSMENT — MIFFLIN-ST. JEOR: SCORE: 2241.84
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
## 2022-02-12 NOTE — PROGRESS NOTES
Service Date: 02/11/2022    HISTORY OF PRESENT ILLNESS:    I had the pleasure of seeing Mr. Demetri Booth, a very nice 66-year-old male, in followup of atrial fibrillation.    Demetri has the following cardiac/medical issues:  A.  Permanent atrial fibrillation.  Treated with rate control (metoprolol) and anticoagulation (apixaban).  B.  Mild chronic cardiomyopathy, EF typically 45%-50%.  C.  Severe obesity.  45-pound weight loss in the past year.  D.  Sleep apnea, on CPAP.   E.  Diabetes mellitus type 2.  F.  Hypertension.  G.  Migraine headaches.  H.  Coronary angiography in 2018 with minimal CAD.    Demetri is feeling better.  By cutting back alcohol use, he has lost over 40 pounds in the past year.  I was not aware that he drank so much alcohol.  Today he told me that he drank at least 4-5 drinks per day, and sometimes more.    When he last saw Luz Maria in 01/2021, his heart rate was on the fast side.  Luz Maria increased metoprolol to 100 mg t.i.d.  However, for the past several months, Demetri has only taken metoprolol twice daily.  He believes he does not need more, as his heart rate is usually okay.    His blood pressure has been under control.  He is using support stockings for lower extremity edema.  He has not had syncope, near syncope, chest pain or other cardiac issues.      PHYSICAL EXAMINATION:    VITAL SIGNS:  Blood pressure 120/80, heart rate 70s and irregular.  I went for a walk with him in the hallway.  We walked approximately 60-70 yards.  His heart rate increased to approximately 110 beats per minute.  He was not breathless.  NECK:  Thick, supple without bruits.  Normal JVP.  LUNGS:  Distant breath sounds, but without crackles or wheezes.  CARDIOVASCULAR:  Irregularly irregular rhythm with controlled rate.  No apparent gallop or murmur.  ABDOMEN:  Very obese, soft.  EXTREMITIES:  He has support stockings bilaterally.  He had at least mild edema.      DIAGNOSTIC STUDIES:  =His 12-lead ECG today showed atrial  fibrillation with ventricular rate in the 80s.  Possible old anteroseptal infarct, age indeterminate.  Recent labs:  Sodium 137, potassium 4.1, creatinine 0.81, hemoglobin A1c 7.0, cholesterol 158, HDL 47, LDL 96.      IMPRESSION:    1.  Permanent atrial fibrillation.  The patient's rate control is very good on metoprolol 100 mg b.i.d.  Continue as is.  Also, continue apixaban.  He was congratulated on the regular use of CPAP.  2.  Minimal CAD, mild cardiomyopathy.  Abstinence from alcohol will help cardiac function.  He does not have symptoms of heart failure.  I believe his lower extremity edema to be on the basis of chronic venous insufficiency.    RECOMMENDATIONS:  A.  Continue current medications.  B.  See EP DONG in 18 months.    It was my pleasure seeing Zaria today.    Total time spent today was 30 minutes.      Chelsea Bird MD, FACC      cc:  EVONNE Velázquez, [place] [address]           D: 2022   T: 2022   MT: [email or web address]    Name:     ZARIA MOONEYTodd  MRN:      [id number]        Account:      [id number]   :      1955           Service Date: 2022       Document: [id number]

## 2022-02-17 DIAGNOSIS — I10 HYPERTENSION GOAL BP (BLOOD PRESSURE) < 130/80: ICD-10-CM

## 2022-02-17 RX ORDER — METOPROLOL TARTRATE 100 MG
100 TABLET ORAL 2 TIMES DAILY
Qty: 180 TABLET | Refills: 3 | Status: SHIPPED | OUTPATIENT
Start: 2022-02-17 | End: 2023-03-23

## 2022-03-05 ENCOUNTER — HEALTH MAINTENANCE LETTER (OUTPATIENT)
Age: 67
End: 2022-03-05

## 2022-04-08 ENCOUNTER — OFFICE VISIT (OUTPATIENT)
Dept: PODIATRY | Facility: CLINIC | Age: 67
End: 2022-04-08
Payer: COMMERCIAL

## 2022-04-08 VITALS
DIASTOLIC BLOOD PRESSURE: 80 MMHG | WEIGHT: 315 LBS | SYSTOLIC BLOOD PRESSURE: 130 MMHG | HEIGHT: 70 IN | BODY MASS INDEX: 45.1 KG/M2

## 2022-04-08 DIAGNOSIS — L60.0 ONYCHOCRYPTOSIS: Primary | ICD-10-CM

## 2022-04-08 DIAGNOSIS — E11.51 TYPE II DIABETES MELLITUS WITH PERIPHERAL ARTERY DISEASE (H): ICD-10-CM

## 2022-04-08 PROCEDURE — 99212 OFFICE O/P EST SF 10 MIN: CPT | Performed by: PODIATRIST
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
## 2022-04-08 NOTE — PATIENT INSTRUCTIONS
PATIENT INSTRUCTIONS - Podiatry / Foot & Ankle Surgery            ROUTINE FOOT CARE (NAIL TRIMMING / CALLUSES)    Go to [email or web address] ([employer]) and search for providers near you.  Otherwise, this is a list we have gathered of  recommended locations/providers in [place].    [facility]   [phone]   [facility]  [phone]  [email or web address]   [facility]  [phone]  [place] + [place] mile [place] [facility]  [phone]  [email or web address]   [facility]  [address] [place] [address] [phone] Anderson Estevez DPM  [address] [place] [address] [phone]   [facility]  [phone] [address] [place] [address]  [facility]  Dr. Juan Whitaker  [phone]  [place], [place]   [facility]  [phone]  [place] [facility]  (does not take [employer]BS) FYI:  *Some providers accept insurance while others are out of pocket. Please contact them for details*

## 2022-04-30 ENCOUNTER — HEALTH MAINTENANCE LETTER (OUTPATIENT)
Age: 67
End: 2022-04-30

## 2022-05-18 DIAGNOSIS — I48.91 ATRIAL FIBRILLATION (H): Primary | ICD-10-CM

## 2022-06-23 DIAGNOSIS — L40.9 PSORIASIS OF SCALP: ICD-10-CM

## 2022-06-23 NOTE — TELEPHONE ENCOUNTER
Routing refill request to provider for review/approval because:  Drug not on the [facility] refill protocol refill request for clobetasol    TC/Reception - please help schedule establish care appt with new provider - previous Benita Clinton pt. Not seen in office since 2/2/21    Thank you,  Felicitas Mario RN  [facility]

## 2022-06-24 RX ORDER — CLOBETASOL PROPIONATE 0.5 MG/G
CREAM TOPICAL
Qty: 30 G | Refills: 1 | Status: SHIPPED | OUTPATIENT
Start: 2022-06-24 | End: 2022-12-16

## 2022-06-27 ENCOUNTER — MYC MEDICAL ADVICE (OUTPATIENT)
Dept: FAMILY MEDICINE | Facility: CLINIC | Age: 67
End: 2022-06-27

## 2022-07-06 ENCOUNTER — VIRTUAL VISIT (OUTPATIENT)
Dept: INTERNAL MEDICINE | Facility: CLINIC | Age: 67
End: 2022-07-06
Payer: COMMERCIAL

## 2022-07-06 DIAGNOSIS — E11.9 TYPE 2 DIABETES MELLITUS WITHOUT COMPLICATION, WITHOUT LONG-TERM CURRENT USE OF INSULIN (H): ICD-10-CM

## 2022-07-06 DIAGNOSIS — E66.813 CLASS 3 SEVERE OBESITY DUE TO EXCESS CALORIES WITH SERIOUS COMORBIDITY IN ADULT, UNSPECIFIED BMI (H): ICD-10-CM

## 2022-07-06 DIAGNOSIS — E66.01 CLASS 3 SEVERE OBESITY DUE TO EXCESS CALORIES WITH SERIOUS COMORBIDITY IN ADULT, UNSPECIFIED BMI (H): ICD-10-CM

## 2022-07-06 PROCEDURE — 99213 OFFICE O/P EST LOW 20 MIN: CPT | Mod: 95 | Performed by: INTERNAL MEDICINE

## 2022-07-06 RX ORDER — SEMAGLUTIDE 1.34 MG/ML
1 INJECTION, SOLUTION SUBCUTANEOUS WEEKLY
Qty: 9 ML | Refills: 3 | Status: SHIPPED | OUTPATIENT
Start: 2022-07-06 | End: 2023-07-26

## 2022-07-06 NOTE — PROGRESS NOTES
"Demetri is a 67 year old who is being evaluated via a billable video visit.      How would you like to obtain your AVS? MyChart  If the video visit is dropped, the invitation should be resent by: Text to cell phone: [phone]  Will anyone else be joining your video visit? No          Assessment & Plan     (E11.9) Type 2 diabetes mellitus without complication, without long-term current use of insulin (H)  Comment: will need update  Plan: CBC with Platelets, HEMOGLOBIN A1C,         Comprehensive metabolic panel, metFORMIN         (GLUCOPHAGE) 1000 MG tablet, Semaglutide, 1         MG/DOSE, (OZEMPIC, 1 MG/DOSE,) 4 MG/3ML SOPN        Out of Ozempic for a while. Renewed though may need a PA per Epic.  Will have him see me in 2 months               BMI:   Estimated body mass index is 47.31 kg/m  as calculated from the following:    Height as of 4/8/22: 1.765 m (5' 9.5\").    Weight as of 4/8/22: 147.4 kg (325 lb).           Return in about 2 months (around 9/6/2022) for Routine preventive, with me.    Jeff Martinez MD  [facility]    Subjective   Demetri is a 67 year old, presenting for the following health issues:    Diabetes (A1c check for med refill with dr mccloud, dr no longer available and cant get anyone to help)      HPI   Establish care  Needs diabetic meds  Unknown control          Review of Systems         Objective           Vitals:  No vitals were obtained today due to virtual visit.    Physical Exam   GENERAL: Healthy, alert and no distress  EYES: Eyes grossly normal to inspection.  No discharge or erythema, or obvious scleral/conjunctival abnormalities.  RESP: No audible wheeze, cough, or visible cyanosis.  No visible retractions or increased work of breathing.    SKIN: Visible skin clear. No significant rash, abnormal pigmentation or lesions.  NEURO: Cranial nerves grossly intact.  Mentation and speech appropriate for age.  PSYCH: Mentation appears normal, affect " normal/bright, judgement and insight intact, normal speech and appearance well-groomed.                Video-Visit Details    Video Start Time: 4:43pm    Type of service:  Video Visit    Video End Time:4:58pm    Originating Location (pt. Location): Home    Distant Location (provider location):  [facility]     Platform used for Video Visit: [employer]    .  ..  Answers for HPI/ROS submitted by the patient on 7/5/2022  Frequency of checking blood sugars:: a few times a month  What time of day are you checking your blood sugars : before meals  Have you had any blood sugars above 200?: Yes  Have you had any blood sugars below 70?: No  Hypoglycemia symptoms:: none  Diabetic concerns:: blood sugar frequently over 200  Paraesthesia present:: numbness in feet, burning in feet  Have you had a diabetic eye exam within the last year?: Yes  Date of last eye exam:: October 2021  [facility]  What is the reason for your visit today? : Check up for diabetes and weight issue. Need script renewal for metformin and ozempic  How many servings of fruits and vegetables do you eat daily?: 2-3  On average, how many sweetened beverages do you drink each day (Examples: soda, juice, sweet tea, etc.  Do NOT count diet or artificially sweetened beverages)?: 4  How many days per week do you miss taking your medication?: 1  What makes it hard for you to take your medication every day?: remembering to take

## 2022-07-25 DIAGNOSIS — I42.9 CARDIOMYOPATHY, UNSPECIFIED TYPE (H): ICD-10-CM

## 2022-07-25 DIAGNOSIS — N50.89 SCROTAL EDEMA: ICD-10-CM

## 2022-07-27 RX ORDER — FUROSEMIDE 40 MG
40 TABLET ORAL DAILY
Qty: 90 TABLET | Refills: 0 | Status: SHIPPED | OUTPATIENT
Start: 2022-07-27 | End: 2022-09-28

## 2022-07-27 NOTE — TELEPHONE ENCOUNTER
"Routing refill request to provider for review/approval because:  Early refill requested.    Last Written Prescription Date:  12/27/21  Last Fill Quantity: 90,  # refills: 2   Last office visit provider:  7/6/22     Requested Prescriptions   Pending Prescriptions Disp Refills     furosemide (LASIX) 40 MG tablet [Pharmacy Med Name: FUROSEMIDE 40 MG TABLET] 90 tablet 2     Sig: TAKE 1 TABLET BY MOUTH EVERY DAY       Diuretics (Including Combos) Protocol Passed - 7/27/2022  3:03 PM        Passed - Blood pressure under 140/90 in past 12 months     BP Readings from Last 3 Encounters:   04/08/22 130/80   02/11/22 120/80   12/15/21 138/80                 Passed - Recent (12 mo) or future (30 days) visit within the authorizing provider's specialty     Patient has had an office visit with the authorizing provider or a provider within the authorizing providers department within the previous 12 mos or has a future within next 30 days. See \"Patient Info\" tab in inbasket, or \"Choose Columns\" in Meds & Orders section of the refill encounter.              Passed - Medication is active on med list        Passed - Patient is age 18 or older        Passed - Normal serum creatinine on file in past 12 months     Recent Labs   Lab Test 10/20/21  1442   CR 0.81              Passed - Normal serum potassium on file in past 12 months     Recent Labs   Lab Test 10/20/21  1442   POTASSIUM 4.1                    Passed - Normal serum sodium on file in past 12 months     Recent Labs   Lab Test 10/20/21  1442                      Oliverio Fisher RN 07/27/22 3:03 PM  "

## 2022-08-12 DIAGNOSIS — I10 HYPERTENSION GOAL BP (BLOOD PRESSURE) < 130/80: ICD-10-CM

## 2022-08-12 RX ORDER — LISINOPRIL 5 MG/1
TABLET ORAL
Qty: 180 TABLET | Refills: 0 | Status: SHIPPED | OUTPATIENT
Start: 2022-08-12 | End: 2022-09-28

## 2022-08-12 NOTE — TELEPHONE ENCOUNTER
Routing refill request to provider for review/approval because:  Drug not on the [facility] refill protocol   Patient needs to be seen because:  refill request for lisinopril     Has an appointment with provider at [facility]  - previous Benita Clinton pt. Not seen in office since 2/2/21    Felicitas Mario RN  [facility]

## 2022-09-21 ASSESSMENT — ENCOUNTER SYMPTOMS
PALPITATIONS: 0
FEVER: 0
MYALGIAS: 1
ABDOMINAL PAIN: 0
HEADACHES: 0
WEAKNESS: 1
SHORTNESS OF BREATH: 0
COUGH: 0
CONSTIPATION: 1
FREQUENCY: 1
DIARRHEA: 0
HEMATOCHEZIA: 0
CHILLS: 1
NERVOUS/ANXIOUS: 1
JOINT SWELLING: 0
NAUSEA: 0
EYE PAIN: 0
SORE THROAT: 0
DIZZINESS: 1
HEMATURIA: 0
ARTHRALGIAS: 1
PARESTHESIAS: 1
DYSURIA: 0
HEARTBURN: 0

## 2022-09-21 ASSESSMENT — ACTIVITIES OF DAILY LIVING (ADL): CURRENT_FUNCTION: NO ASSISTANCE NEEDED

## 2022-09-28 ENCOUNTER — OFFICE VISIT (OUTPATIENT)
Dept: INTERNAL MEDICINE | Facility: CLINIC | Age: 67
End: 2022-09-28
Payer: COMMERCIAL

## 2022-09-28 VITALS
TEMPERATURE: 97.9 F | HEART RATE: 99 BPM | OXYGEN SATURATION: 99 % | WEIGHT: 315 LBS | SYSTOLIC BLOOD PRESSURE: 120 MMHG | BODY MASS INDEX: 46.65 KG/M2 | DIASTOLIC BLOOD PRESSURE: 88 MMHG | HEIGHT: 69 IN

## 2022-09-28 DIAGNOSIS — Z00.00 ENCOUNTER FOR MEDICARE ANNUAL WELLNESS EXAM: Primary | ICD-10-CM

## 2022-09-28 DIAGNOSIS — G47.33 OBSTRUCTIVE SLEEP APNEA SYNDROME: ICD-10-CM

## 2022-09-28 DIAGNOSIS — I10 ESSENTIAL HYPERTENSION, BENIGN: ICD-10-CM

## 2022-09-28 DIAGNOSIS — E11.51 TYPE 2 DIABETES MELLITUS WITH DIABETIC PERIPHERAL ANGIOPATHY WITHOUT GANGRENE, WITHOUT LONG-TERM CURRENT USE OF INSULIN (H): ICD-10-CM

## 2022-09-28 DIAGNOSIS — N50.89 SCROTAL EDEMA: ICD-10-CM

## 2022-09-28 DIAGNOSIS — E66.01 MORBID OBESITY (H): ICD-10-CM

## 2022-09-28 DIAGNOSIS — I48.91 ATRIAL FIBRILLATION, UNSPECIFIED TYPE (H): ICD-10-CM

## 2022-09-28 DIAGNOSIS — E78.00 PURE HYPERCHOLESTEROLEMIA: ICD-10-CM

## 2022-09-28 DIAGNOSIS — I10 HYPERTENSION GOAL BP (BLOOD PRESSURE) < 130/80: ICD-10-CM

## 2022-09-28 DIAGNOSIS — I42.9 CARDIOMYOPATHY, UNSPECIFIED TYPE (H): ICD-10-CM

## 2022-09-28 DIAGNOSIS — E78.00 HYPERCHOLESTEROLEMIA: ICD-10-CM

## 2022-09-28 LAB
ALBUMIN SERPL BCG-MCNC: 3.5 G/DL (ref 3.5–5.2)
ALP SERPL-CCNC: 97 U/L (ref 40–129)
ALT SERPL W P-5'-P-CCNC: 24 U/L (ref 10–50)
ANION GAP SERPL CALCULATED.3IONS-SCNC: 12 MMOL/L (ref 7–15)
AST SERPL W P-5'-P-CCNC: 40 U/L (ref 10–50)
BILIRUB SERPL-MCNC: 1.6 MG/DL
BUN SERPL-MCNC: 13.3 MG/DL (ref 8–23)
CALCIUM SERPL-MCNC: 8.9 MG/DL (ref 8.8–10.2)
CHLORIDE SERPL-SCNC: 97 MMOL/L (ref 98–107)
CREAT SERPL-MCNC: 0.89 MG/DL (ref 0.67–1.17)
CREAT UR-MCNC: 115 MG/DL
DEPRECATED HCO3 PLAS-SCNC: 29 MMOL/L (ref 22–29)
GFR SERPL CREATININE-BSD FRML MDRD: >90 ML/MIN/1.73M2
GLUCOSE SERPL-MCNC: 120 MG/DL (ref 70–99)
HBA1C MFR BLD: 6.6 % (ref 0–5.6)
MICROALBUMIN UR-MCNC: <12 MG/L
MICROALBUMIN/CREAT UR: NORMAL MG/G{CREAT}
POTASSIUM SERPL-SCNC: 4 MMOL/L (ref 3.4–5.3)
PROT SERPL-MCNC: 6.7 G/DL (ref 6.4–8.3)
SODIUM SERPL-SCNC: 138 MMOL/L (ref 136–145)

## 2022-09-28 PROCEDURE — 82043 UR ALBUMIN QUANTITATIVE: CPT | Performed by: INTERNAL MEDICINE

## 2022-09-28 PROCEDURE — 83036 HEMOGLOBIN GLYCOSYLATED A1C: CPT | Performed by: INTERNAL MEDICINE

## 2022-09-28 PROCEDURE — 80053 COMPREHEN METABOLIC PANEL: CPT | Performed by: INTERNAL MEDICINE

## 2022-09-28 PROCEDURE — 36415 COLL VENOUS BLD VENIPUNCTURE: CPT | Performed by: INTERNAL MEDICINE

## 2022-09-28 PROCEDURE — 99214 OFFICE O/P EST MOD 30 MIN: CPT | Mod: 25 | Performed by: INTERNAL MEDICINE

## 2022-09-28 PROCEDURE — G0439 PPPS, SUBSEQ VISIT: HCPCS | Performed by: INTERNAL MEDICINE

## 2022-09-28 RX ORDER — FUROSEMIDE 40 MG
40 TABLET ORAL DAILY
Qty: 90 TABLET | Refills: 3 | Status: SHIPPED | OUTPATIENT
Start: 2022-09-28

## 2022-09-28 RX ORDER — ATORVASTATIN CALCIUM 10 MG/1
10 TABLET, FILM COATED ORAL DAILY
Qty: 90 TABLET | Refills: 3 | Status: SHIPPED | OUTPATIENT
Start: 2022-09-28 | End: 2023-10-10

## 2022-09-28 RX ORDER — LISINOPRIL 5 MG/1
5 TABLET ORAL 2 TIMES DAILY
Qty: 180 TABLET | Refills: 3 | Status: SHIPPED | OUTPATIENT
Start: 2022-09-28 | End: 2023-10-10

## 2022-09-28 ASSESSMENT — ENCOUNTER SYMPTOMS
HEADACHES: 0
FREQUENCY: 1
ABDOMINAL PAIN: 0
EYE PAIN: 0
SHORTNESS OF BREATH: 0
ARTHRALGIAS: 1
SORE THROAT: 0
JOINT SWELLING: 0
HEMATOCHEZIA: 0
HEMATURIA: 0
PALPITATIONS: 0
FEVER: 0
NERVOUS/ANXIOUS: 1
CHILLS: 1
COUGH: 0
DYSURIA: 0
NAUSEA: 0
DIZZINESS: 1
WEAKNESS: 1
DIARRHEA: 0
CONSTIPATION: 1
HEARTBURN: 0
MYALGIAS: 1
PARESTHESIAS: 1

## 2022-09-28 ASSESSMENT — ACTIVITIES OF DAILY LIVING (ADL): CURRENT_FUNCTION: NO ASSISTANCE NEEDED

## 2022-09-28 NOTE — PROGRESS NOTES
"SUBJECTIVE:   Demetri is a 67 year old who presents for Preventive Visit.      Patient has been advised of split billing requirements and indicates understanding: Yes  Are you in the first 12 months of your Medicare coverage?  No    Healthy Habits:     In general, how would you rate your overall health?  Fair    Frequency of exercise:  None    Do you usually eat at least 4 servings of fruit and vegetables a day, include whole grains    & fiber and avoid regularly eating high fat or \"junk\" foods?  No    Taking medications regularly:  Yes    Medication side effects:  Other    Ability to successfully perform activities of daily living:  No assistance needed    Home Safety:  No safety concerns identified    Hearing Impairment:  Difficulty understanding soft or whispered speech    In the past 6 months, have you been bothered by leaking of urine? Yes    In general, how would you rate your overall mental or emotional health?  Good      PHQ-2 Total Score: 0    Additional concerns today:  No    Do you feel safe in your environment? Yes    Have you ever done Advance Care Planning? (For example, a Health Directive, POLST, or a discussion with a medical provider or your loved ones about your wishes): No, advance care planning information given to patient to review.  Advanced care planning was discussed at today's visit.       Fall risk  Fallen 2 or more times in the past year?: Yes  Any fall with injury in the past year?: Yes    Cognitive Screening   1) Repeat 3 items (Leader, Season, Table)    2) Clock draw: NORMAL  3) 3 item recall: Recalls 3 objects  Results: 3 items recalled: COGNITIVE IMPAIRMENT LESS LIKELY    Mini-CogTM Copyright JUAN [employer]iva. Licensed by the author for use in [facility]; reprinted with permission ([email or web address]). All rights reserved.      Do you have sleep apnea, excessive snoring or daytime drowsiness?: yes    Reviewed and updated as needed this visit by clinical staff                    Reviewed " and updated as needed this visit by Provider                   Social History     Tobacco Use     Smoking status: Former Smoker     Quit date: 1970     Years since quittin.0     Smokeless tobacco: Never Used   Substance Use Topics     Alcohol use: Yes     Alcohol/week: 0.0 standard drinks     Comment: cut way back - usually only once a month.      If you drink alcohol do you typically have >3 drinks per day or >7 drinks per week? Yes      Alcohol Use 2022   Prescreen: >3 drinks/day or >7 drinks/week? Yes   Prescreen: >3 drinks/day or >7 drinks/week? -   AUDIT SCORE  15           Annual Wellness Visit    Patient has been advised of split billing requirements and indicates understanding: Yes     Are you in the first 12 months of your Medicare Part B coverage?  No    Physical Health:    In general, how would you rate your overall physical health? good    Outside of work, how many days during the week do you exercise?1 day/week    Outside of work, approximately how many minutes a day do you exercise?less than 15 minutes  If you drink alcohol do you typically have >3 drinks per day or >7 drinks per week? Yes - AUDIT SCORE:  15  AUDIT - Alcohol Use Disorders Identification Test - Reproduced from the World Health Organization Audit 2001 (Second Edition) 2022   1.  How often do you have a drink containing alcohol? 2 to 3 times a week   2.  How many drinks containing alcohol do you have on a typical day when you are drinking? 3 or 4   3.  How often do you have five or more drinks on one occasion? Less than monthly   4.  How often during the last year have you found that you were not able to stop drinking once you had started? Weekly   5.  How often during the last year have you failed to do what was normally expected of you because of drinking? Monthly   6.  How often during the last year have you needed a first drink in the morning to get yourself going after a heavy drinking session? Never   7.  How  "often during the last year have you had a feeling of guilt or remorse after drinking? Less than monthly   8.  How often during the last year have you been unable to remember what happened the night before because of your drinking? Never   9.  Have you or someone else been injured because of your drinking? No   10. Has a relative, friend, doctor or other health care worker been concerned about your drinking or suggested you cut down? Yes, during the last year   TOTAL SCORE 15         Do you usually eat at least 4 servings of fruit and vegetables a day, include whole grains & fiber and avoid regularly eating high fat or \"junk\" foods? Yes    Do you have any problems taking medications regularly? No    Do you have any side effects from medications? none    Needs assistance for the following daily activities: no assistance needed    Which of the following safety concerns are present in your home?  none identified     Hearing impairment: No    In the past 6 months, have you been bothered by leaking of urine? no    Mental Health:    In general, how would you rate your overall mental or emotional health? fair  PHQ-2 Score: 0    Do you feel safe in your environment? Yes    Have you ever done Advance Care Planning? (For example, a Health Directive, POLST, or a discussion with a medical provider or your loved ones about your wishes)? No, advance care planning information given to patient to review.  Patient plans to discuss their wishes with loved ones or provider.      Fall risk:  Fallen 2 or more times in the past year?: Yes  Any fall with injury in the past year?: Yes    Cognitive Screenin) Repeat 3 items (Leader, Season, Table)    2) Clock draw: NORMAL  3) 3 item recall: Recalls 3 objects  Results: 0 items recalled: PROBABLE COGNITIVE IMPAIRMENT, **INFORM PROVIDER**    Mini-CogTM Copyright JUAN Oliva. Licensed by the author for use in [facility]; reprinted with permission ([email or web address]). All rights " reserved.          Current providers sharing in care for this patient include:   Patient Care Team:  Jeff Martinez MD as PCP - General (Internal Medicine)  Yareli Johnson MD as Referring Physician (Family Practice)  Carolyn Burdick MD as MD (Urology)  Melanie Rosales, RP as Pharmacist (Pharmacist)  Horace Jeffery DPM as Assigned Musculoskeletal Provider  Hien George PA as Physician Assistant (Urology)  Carla Pak APRN CNP as Assigned PCP  Nolan Mora MD as Assigned Endocrinology Provider  Bloch, Lauren Turner, RP as Pharmacist (Pharmacist)  Chelsea Bird MD as Assigned Heart and Vascular Provider  Nathaly Manriquez DPM as Assigned Surgical Provider    Patient has been advised of split billing requirements and indicates understanding: Yes    Current providers sharing in care for this patient include:   Patient Care Team:  Jeff Martinez MD as PCP - General (Internal Medicine)  Yareli Johnson MD as Referring Physician (Family Practice)  Carolyn Burdick MD as MD (Urology)  Melanie Rosales, RP as Pharmacist (Pharmacist)  Horace Jeffery DPM as Assigned Musculoskeletal Provider  Hien George PA as Physician Assistant (Urology)  Carla Pak APRN CNP as Assigned PCP  Nolan Mora MD as Assigned Endocrinology Provider  Bloch, Lauren Turner, RPH as Pharmacist (Pharmacist)  Chelsea Bird MD as Assigned Heart and Vascular Provider  Nathaly Manriquez DPM as Assigned Surgical Provider    The following health maintenance items are reviewed in Epic and correct as of today:  Health Maintenance   Topic Date Due     ANNUAL REVIEW OF HM ORDERS  Never done     ZOSTER IMMUNIZATION (1 of 2) Never done     HF ACTION PLAN  06/19/2021     MEDICARE ANNUAL WELLNESS VISIT  02/02/2022     CBC  02/02/2022     A1C  04/20/2022     BMP  04/20/2022     COVID-19 Vaccine (4 - Booster  "for Xochitl series) 06/18/2022     INFLUENZA VACCINE (1) 09/01/2022     EYE EXAM  10/01/2022     ALT  10/20/2022     LIPID  10/20/2022     MICROALBUMIN  10/20/2022     DIABETIC FOOT EXAM  10/20/2022     FALL RISK ASSESSMENT  09/28/2023     DTAP/TDAP/TD IMMUNIZATION (2 - Td or Tdap) 02/05/2024     COLORECTAL CANCER SCREENING  03/31/2024     ADVANCE CARE PLANNING  02/02/2026     TSH W/FREE T4 REFLEX  Completed     HEPATITIS C SCREENING  Completed     PHQ-2 (once per calendar year)  Completed     Pneumococcal Vaccine: 65+ Years  Completed     AORTIC ANEURYSM SCREENING (SYSTEM ASSIGNED)  Completed     IPV IMMUNIZATION  Aged Out     MENINGITIS IMMUNIZATION  Aged Out               Review of Systems   Constitutional: Positive for chills. Negative for fever.   HENT: Negative for congestion, ear pain, hearing loss and sore throat.    Eyes: Negative for pain and visual disturbance.   Respiratory: Negative for cough and shortness of breath.    Cardiovascular: Positive for peripheral edema. Negative for chest pain and palpitations.   Gastrointestinal: Positive for constipation. Negative for abdominal pain, diarrhea, heartburn, hematochezia and nausea.   Genitourinary: Positive for frequency and impotence. Negative for dysuria, genital sores, hematuria, penile discharge and urgency.   Musculoskeletal: Positive for arthralgias and myalgias. Negative for joint swelling.   Skin: Negative for rash.   Neurological: Positive for dizziness, weakness and paresthesias. Negative for headaches.   Psychiatric/Behavioral: Positive for mood changes. The patient is nervous/anxious.          OBJECTIVE:   There were no vitals taken for this visit. Estimated body mass index is 47.31 kg/m  as calculated from the following:    Height as of 4/8/22: 1.765 m (5' 9.5\").    Weight as of 4/8/22: 147.4 kg (325 lb).  Physical Exam  GENERAL: healthy, alert and no distress  EYES: Eyes grossly normal to inspection, PERRL and conjunctivae and sclerae " normal  HENT: ear canals and TM's normal, nose and mouth without ulcers or lesions  NECK: no adenopathy, no asymmetry, masses, or scars and thyroid normal to palpation  RESP: lungs clear to auscultation - no rales, rhonchi or wheezes  CV: [id number]. Some peripheral edema.  ABDOMEN: soft, nontender, no hepatosplenomegaly, no masses and bowel sounds normal  MS: no gross musculoskeletal defects noted, no edema  SKIN: no suspicious lesions or rashes  NEURO: Normal strength and tone, mentation intact and speech normal  PSYCH: mentation appears normal, affect normal/bright        ASSESSMENT / PLAN:   (Z00.00) Encounter for Medicare annual wellness exam  (primary encounter diagnosis)  Comment: chronic conditions reviewed  Plan: see discussion below    (E78.00) Hypercholesterolemia  Comment: on statin  Plan: atorvastatin (LIPITOR) 10 MG tablet        Will plan to continue on previous medication without changes     (E66.01) Morbid obesity (H)  Comment: some weight loss-mostly attributed to decreased etoh intak  Plan: encouragement given to continue to cut back, physical activity as tolerated.    (I42.9) Cardiomyopathy, unspecified type (H)  Comment: EF 45-50% per last report. etoh likely. Minimal CAD  Plan: furosemide (LASIX) 40 MG tablet, Comprehensive         metabolic panel        Follows with cardiology. Stable.    (I10) Hypertension goal BP (blood pressure) < 130/80  Comment: contrtolled  Plan: lisinopril (ZESTRIL) 5 MG tablet        Will plan to continue on previous medication without changes     (E11.51,  E11.65) DM (diabetes mellitus) type II uncontrolled, periph vascular disorder (H)  Comment: stable  Plan: Albumin Random Urine Quantitative with Creat         Ratio, Hemoglobin A1c        Recheck today. Suspect there has been some improvement with his etoh volume decrease and weight loss.  See me again in 4 months.  Eye exam and microalb up to date.    (I48.91) Atrial fibrillation, unspecified type (H)  Comment:  "permanent  Plan: sees cards. EF 45-50%. Rate control. On Eliquis.    SHAHIDA-uses CPAP        COUNSELING:  Reviewed preventive health counseling, as reflected in patient instructions    Estimated body mass index is 47.31 kg/m  as calculated from the following:    Height as of 4/8/22: 1.765 m (5' 9.5\").    Weight as of 4/8/22: 147.4 kg (325 lb).    Weight management plan: Discussed healthy diet and exercise guidelines    He reports that he quit smoking about 52 years ago. He has never used smokeless tobacco.      Appropriate preventive services were discussed with this patient, including applicable screening as appropriate for cardiovascular disease, diabetes, osteopenia/osteoporosis, and glaucoma.  As appropriate for age/gender, discussed screening for colorectal cancer, prostate cancer, breast cancer, and cervical cancer. Checklist reviewing preventive services available has been given to the patient.    Reviewed patients plan of care and provided an AVS. The Basic Care Plan (routine screening as documented in Health Maintenance) for Demetri meets the Care Plan requirement. This Care Plan has been established and reviewed with the spouse.    Counseling Resources:  ATP IV Guidelines  Pooled Cohorts Equation Calculator  Breast Cancer Risk Calculator  Breast Cancer: Medication to Reduce Risk  FRAX Risk Assessment  ICSI Preventive Guidelines  Dietary Guidelines for Americans, 2010  [employer]'s MyPlate  ASA Prophylaxis  Lung CA Screening    Jeff Martinez MD  [facility]    Identified Health Risks:  "

## 2022-09-28 NOTE — PATIENT INSTRUCTIONS
Patient Education   Personalized Prevention Plan  You are due for the preventive services outlined below.  Your care team is available to assist you in scheduling these services.  If you have already completed any of these items, please share that information with your care team to update in your medical record.  Health Maintenance Due   Topic Date Due     ANNUAL REVIEW OF HM ORDERS  Never done     Zoster (Shingles) Vaccine (1 of 2) Never done     Heart Failure Action Plan  06/19/2021     Complete Blood Count  02/02/2022     A1C Lab  04/20/2022     Basic Metabolic Panel  04/20/2022     COVID-19 Vaccine (4 - Booster for Xochitl series) 06/18/2022     Flu Vaccine (1) 09/01/2022     Eye Exam  10/01/2022     Liver Monitoring Lab  10/20/2022     Cholesterol Lab  10/20/2022     Kidney Microalbumin Urine Test  10/20/2022     Diabetic Foot Exam  10/20/2022     Your Health Risk Assessment indicates you feel you are not in good health    A healthy lifestyle helps keep the body fit and the mind alert. It helps protect you from disease, helps you fight disease, and helps prevent chronic disease (disease that doesn't go away) from getting worse. This is important as you get older and begin to notice twinges in muscles and joints and a decline in the strength and stamina you once took for granted. A healthy lifestyle includes good healthcare, good nutrition, weight control, recreation, and regular exercise. Avoid harmful substances and do what you can to keep safe. Another part of a healthy lifestyle is stay mentally active and socially involved.    Good healthcare     Have a wellness visit every year.     If you have new symptoms, let us know right away. Don't wait until the next checkup.     Take medicines exactly as prescribed and keep your medicines in a safe place. Tell us if your medicine causes problems.   Healthy diet and weight control     Eat 3 or 4 small, nutritious, low-fat, high-fiber meals a day. Include a variety  of fruits, vegetables, and whole-grain foods.     Make sure you get enough calcium in your diet. Calcium, vitamin D, and exercise help prevent osteoporosis (bone thinning).     If you live alone, try eating with others when you can. That way you get a good meal and have company while you eat it.     Try to keep a healthy weight. If you eat more calories than your body uses for energy, it will be stored as fat and you will gain weight.     Recreation   Recreation is not limited to sports and team events. It includes any activity that provides relaxation, interest, enjoyment, and exercise. Recreation provides an outlet for physical, mental, and social energy. It can give a sense of worth and achievement. It can help you stay healthy.    Mental Exercise and Social Involvement  Mental and emotional health is as important as physical health. Keep in touch with friends and family. Stay as active as possible. Continue to learn and challenge yourself.   Things you can do to stay mentally active are:    Learn something new, like a foreign language or musical instrument.     Play SCRABBLE or do crossword puzzles. If you cannot find people to play these games with you at home, you can play them with others on your computer through the Internet.     Join a games club--anything from card games to chess or checkers or lawn bowling.     Start a new hobby.     Go back to school.     Volunteer.     Read.   Keep up with world events.    Exercise for a Healthier Heart  You may wonder how you can improve the health of your heart. If you re thinking about exercise, you re on the right track. You don t need to become an athlete. But you do need a certain amount of brisk exercise to help strengthen your heart. If you have been diagnosed with a heart condition, your healthcare provider may advise exercise to help stabilize your condition. To help make exercise a habit, choose safe, fun activities.      Exercise with a friend. When activity  is fun, you're more likely to stick with it.   Before you start  Check with your healthcare provider before starting an exercise program. This is especially important if you have not been active for a while. It's also important if you have a long-term (chronic) health problem such as heart disease, diabetes, or obesity. Or if you are at high risk for having these problems.   Why exercise?  Exercising regularly offers many healthy rewards. It can help you do all of the following:     Improve your blood cholesterol level to help prevent further heart trouble    Lower your blood pressure to help prevent a stroke or heart attack    Control diabetes, or reduce your risk of getting this disease    Improve your heart and lung function    Reach and stay at a healthy weight    Make your muscles stronger so you can stay active    Prevent falls and fractures by slowing the loss of bone mass (osteoporosis)    Manage stress better    Reduce your blood pressure    Improve your sense of self and your body image  Exercise tips      Ease into your routine. Set small goals. Then build on them. If you are not sure what your activity level should be, talk with your healthcare provider first before starting an exercise routine.    Exercise on most days. Aim for a total of 150 minutes (2 hours and 30 minutes) or more of moderate-intensity aerobic activity each week. Or 75 minutes (1 hour and 15 minutes) or more of vigorous-intensity aerobic activity each week. Or try for a combination of both. Moderate activity means that you breathe heavier and your heart rate increases but you can still talk. Think about doing 40 minutes of moderate exercise, 3 to 4 times a week. For best results, activity should last for about 40 minutes to lower blood pressure and cholesterol. It's OK to work up to the 40-minute period over time. Examples of moderate-intensity activity are walking 1 mile in 15 minutes. Or doing 30 to 45 minutes of yard work.    Step  up your daily activity level.  Along with your exercise program, try being more active the whole day. Walk instead of drive. Or park further away so that you take more steps each day. Do more household tasks or yard work. You may not be able to meet the advised mount of physical activity. But doing some moderate- or vigorous-intensity aerobic activity can help reduce your risk for heart disease. Your healthcare provider can help you figure out what is best for you.    Choose 1 or more activities you enjoy.  Walking is one of the easiest things you can do. You can also try swimming, riding a bike, dancing, or taking an exercise class.    When to call your healthcare provider  Call your healthcare provider if you have any of these:     Chest pain or feel dizzy or lightheaded    Burning, tightness, pressure, or heaviness in your chest, neck, shoulders, back, or arms    Abnormal shortness of breath    More joint or muscle pain    A very fast or irregular heartbeat (palpitations)  [employer] last reviewed this educational content on 7/1/2019 2000-2021 [employer]. All rights reserved. This information is not intended as a substitute for professional medical care. Always follow your healthcare professional's instructions.          Understanding USDA MyPlate  The USDA has guidelines to help you make healthy food choices. These are called MyPlate. MyPlate shows the food groups that make up healthy meals using the image of a place setting. Before you eat, think about the healthiest choices for what to put on your plate or in your cup or bowl. To learn more about building a healthy plate, visit www.choosemyplate.gov.    The food groups    Fruits. Any fruit or 100% fruit juice counts as part of the Fruit Group. Fruits may be fresh, canned, frozen, or dried, and may be whole, cut-up, or pureed. Make 1/2 of your plate fruits and vegetables.    Vegetables. Any vegetable or 100% vegetable juice counts as a member of  the Vegetable Group. Vegetables may be fresh, frozen, canned, or dried. They can be served raw or cooked and may be whole, cut-up, or mashed. Make 1/2 of your plate fruits and vegetables.    Grains. All foods made from grains are part of the Grains Group. These include wheat, rice, oats, cornmeal, and barley. Grains are often used to make foods such as bread, pasta, oatmeal, cereal, tortillas, and grits. Grains should be no more than 1/4 of your plate. At least half of your grains should be whole grains.    Protein. This group includes meat, poultry, seafood, beans and peas, eggs, processed soy products (such as tofu), nuts (including nut butters), and seeds. Make protein choices no more than 1/4 of your plate. Meat and poultry choices should be lean or low fat.    Dairy. The Dairy Group includes all fluid milk products and foods made from milk that contain calcium, such as yogurt and cheese. (Foods that have little calcium, such as cream, butter, and cream cheese, are not part of this group.) Most dairy choices should be low-fat or fat-free.    Oils. Oils aren't a food group, but they do contain essential nutrients. However it's important to watch your intake of oils. These are fats that are liquid at room temperature. They include canola, corn, olive, soybean, vegetable, and sunflower oil. Foods that are mainly oil include mayonnaise, certain salad dressings, and soft margarines. You likely already get your daily oil allowance from the foods you eat.  Things to limit  Eating healthy also means limiting these things in your diet:       Salt (sodium). Many processed foods have a lot of sodium. To keep sodium intake down, eat fresh vegetables, meats, poultry, and seafood when possible. Purchase low-sodium, reduced-sodium, or no-salt-added food products at the store. And don't add salt to your meals at home. Instead, season them with herbs and spices such as dill, oregano, cumin, and paprika. Or try adding flavor with  lemon or lime zest and juice.    Saturated fat. Saturated fats are most often found in animal products such as beef, pork, and chicken. They are often solid at room temperature, such as butter. To reduce your saturated fat intake, choose leaner cuts of meat and poultry. And try healthier cooking methods such as grilling, broiling, roasting, or baking. For a simple lower-fat swap, use plain nonfat yogurt instead of mayonnaise when making potato salad or macaroni salad.    Added sugars. These are sugars added to foods. They are in foods such as ice cream, candy, soda, fruit drinks, sports drinks, energy drinks, cookies, pastries, jams, and syrups. Cut down on added sugars by sharing sweet treats with a family member or friend. You can also choose fruit for dessert, and drink water or other unsweetened beverages.     [employer] last reviewed this educational content on 6/1/2020 2000-2021 [employer]. All rights reserved. This information is not intended as a substitute for professional medical care. Always follow your healthcare professional's instructions.          Signs of Hearing Loss      Hearing much better with one ear can be a sign of hearing loss.   Hearing loss is a problem shared by many people. In fact, it is one of the most common health problems, particularly as people age. Most people age 65 and older have some hearing loss. By age 80, almost everyone does. Hearing loss often occurs slowly over the years. So you may not realize your hearing has gotten worse.  Have your hearing checked  Call your healthcare provider if you:    Have to strain to hear normal conversation    Have to watch other people s faces very carefully to follow what they re saying    Need to ask people to repeat what they ve said    Often misunderstand what people are saying    Turn the volume of the television or radio up so high that others complain    Feel that people are mumbling when they re talking to you    Find  that the effort to hear leaves you feeling tired and irritated    Notice, when using the phone, that you hear better with one ear than the other  [employer] last reviewed this educational content on 1/1/2020 2000-2021 [employer]. All rights reserved. This information is not intended as a substitute for professional medical care. Always follow your healthcare professional's instructions.          Urinary Incontinence (Male)    Urinary incontinence means not being able to control the release of urine from the bladder.   Causes  Common causes of urinary incontinence in men include:    Infection    Certain medicines    Aging    Poor pelvic muscle tone    Bladder spasms    Obesity    Trouble urinating and fully emptying the bladder (urinary retention)  Other things that can cause incontinence are:     Nervous system diseases    Diabetes    Sleep apnea    Urinary tract infections    Prostate surgery    Pelvic injury  Constipation and smoking have also been identified as risk factors.   Symptoms    Urge incontinence (overactive bladder). This is a sudden urge to urinate. It occurs even though there may not be much urine in the bladder. The need to urinate often during the night is common. It's due to bladder spasms.    Stress incontinence. This is urine leakage that you can't control. It can occur with sneezing, coughing, and other actions that put stress on the bladder.    Treatment  Treatment depends on what is causing the condition. Bladder infections are treated with antibiotics. Urinary retention is treated with a bladder catheter.   Home care  Follow these guidelines when caring for yourself at home:    Don't have any foods and drinks that may irritate the bladder. This includes:  ? Chocolate  ? Alcohol  ? Caffeine  ? Carbonated drinks  ? Acidic fruits and juices    Limit fluids to 6 to 8 cups a day.    Lose weight if you are overweight. This will reduce your symptoms.    If advised, do regular pelvic  muscle-strengthening exercises such as Kegel exercises.    If needed, wear absorbent pads to catch urine. Change the pads often. This is for good hygiene and to prevent skin and bladder infections.    Bathe daily for good hygiene.    If an antibiotic was prescribed to treat a bladder infection, take it until it's finished. Keep taking it even if you are feeling better. This is to make sure your infection has cleared.    If a catheter was left in place, keep bacteria from getting into the collection bag. Don't disconnect the catheter from the collection bag.    Use a leg band to secure the catheter drainage tube, so it does not pull on the catheter. Drain the collection bag when it becomes full. To do this, use the drain spout at the bottom of the bag. Don't disconnect the bag from the catheter.    Don't pull on or try to remove a catheter. The catheter must be removed by a healthcare provider.    If you smoke, stop. Ask your provider for help if you can't do this on your own.  Follow-up care  Follow up with your healthcare provider, or as advised.  When to get medical advice  Call your healthcare provider right away if any of these occur:    Fever over 100.4 F (38 C), or as directed by your provider    Bladder pain or fullness    Belly swelling, nausea, or vomiting    Back pain    Weakness, dizziness, or fainting    If a catheter was left in place, return if:  ? The catheter falls out  ? The catheter stops draining for 6 hours  ? Your urine gets cloudy or smells bad  [employer] last reviewed this educational content on 1/1/2020 2000-2021 [employer]. All rights reserved. This information is not intended as a substitute for professional medical care. Always follow your healthcare professional's instructions.           At your visit today, we discussed your risk for falls and preventive options.    Fall Prevention  Falls often occur due to slipping, tripping or losing your balance. Millions of people  fall every year and injure themselves. Here are ways to reduce your risk of falling again.     Think about your fall, was there anything that caused your fall that can be fixed, removed, or replaced?    Make your home safe by keeping walkways clear of objects you may trip over, such as electric cords.    Use non-slip pads under rugs. Don't use area rugs or small throw rugs.    Use non-slip mats in bathtubs and showers.    Install handrails and lights on staircases. The handrails should be on both sides of the stairs.    Don't walk in poorly lit areas.    Don't stand on chairs or wobbly ladders.    Use caution when reaching overhead or looking upward. This position can cause a loss of balance.    Be sure your shoes fit properly, have non-slip bottoms and are in good condition.     Wear shoes both inside and out. Don't go barefoot or wear slippers.    Be cautious when going up and down stairs, curbs, and when walking on uneven sidewalks.    If your balance is poor, consider using a cane or walker.    If your fall was related to alcohol use, stop or limit alcohol intake.     If your fall was related to use of sleeping medicines, talk to your healthcare provider about this. You may need to reduce your dosage at bedtime if you awaken during the night to go to the bathroom.      To reduce the need for nighttime bathroom trips:  ? Don't drink fluids for several hours before going to bed  ? Empty your bladder before going to bed  ? Men can keep a urinal at the bedside    Stay as active as you can. Balance, flexibility, strength, and endurance all come from exercise. They all play a role in preventing falls. Ask your healthcare provider which types of activity are right for you.    Get your vision checked on a regular basis.    If you have pets, know where they are before you stand up or walk so you don't trip over them.    Use night lights.    Go over all your medicines with a pharmacist or other healthcare provider to see  if any of them could make you more likely to fall.  [employer] last reviewed this educational content on 4/1/2018 2000-2021 [employer]. All rights reserved. This information is not intended as a substitute for professional medical care. Always follow your healthcare professional's instructions.

## 2022-10-01 ENCOUNTER — TRANSFERRED RECORDS (OUTPATIENT)
Dept: MULTI SPECIALTY CLINIC | Facility: CLINIC | Age: 67
End: 2022-10-01

## 2022-10-01 PROBLEM — E11.51 TYPE 2 DIABETES MELLITUS WITH DIABETIC PERIPHERAL ANGIOPATHY WITHOUT GANGRENE, WITHOUT LONG-TERM CURRENT USE OF INSULIN (H): Status: ACTIVE | Noted: 2022-10-01

## 2022-10-01 LAB — RETINOPATHY: NORMAL

## 2022-12-15 DIAGNOSIS — L40.9 PSORIASIS OF SCALP: ICD-10-CM

## 2022-12-15 NOTE — TELEPHONE ENCOUNTER
Requested Prescriptions   Pending Prescriptions Disp Refills     clobetasol (TEMOVATE) 0.05 % external cream [Pharmacy Med Name: CLOBETASOL 0.05% CREAM] 30 g 1     Sig: APPLY TOPICALLY TWICE DAILY AS NEEDED FOR UP TO TWO WEEKS, THEN INTERMITTENLY.       There is no refill protocol information for this order        Est care at different clinic, routing to that team if OK filling as old PCP (Mary Beth) no longer practicing at this clinic.    Kady OLVERA RN  [facility]

## 2022-12-16 RX ORDER — CLOBETASOL PROPIONATE 0.5 MG/G
CREAM TOPICAL
Qty: 30 G | Refills: 1 | Status: SHIPPED | OUTPATIENT
Start: 2022-12-16 | End: 2023-10-06

## 2022-12-16 NOTE — TELEPHONE ENCOUNTER
Routing refill request to provider for review/approval because:  Drug not on the [facility] refill protocol   System reports patient not taking medication.    Last Written Prescription Date:  6/24/22  Last Fill Quantity: 30 g,  # refills: 1   Last office visit provider:  9/28/22     Requested Prescriptions   Pending Prescriptions Disp Refills     clobetasol (TEMOVATE) 0.05 % external cream [Pharmacy Med Name: CLOBETASOL 0.05% CREAM] 30 g 1     Sig: APPLY TOPICALLY TWICE DAILY AS NEEDED FOR UP TO TWO WEEKS, THEN INTERMITTENLY.       There is no refill protocol information for this order          Oliverio Fisher RN 12/16/22 10:23 AM

## 2023-01-04 DIAGNOSIS — E11.9 TYPE 2 DIABETES MELLITUS WITHOUT COMPLICATION, WITHOUT LONG-TERM CURRENT USE OF INSULIN (H): ICD-10-CM

## 2023-01-04 NOTE — TELEPHONE ENCOUNTER
"Last Written Prescription Date:  7/6/22  Last Fill Quantity: 180,  # refills: 1   Last office visit provider:  9/28/22     Requested Prescriptions   Pending Prescriptions Disp Refills     metFORMIN (GLUCOPHAGE) 1000 MG tablet [Pharmacy Med Name: METFORMIN HCL 1,000 MG TABLET] 180 tablet 1     Sig: TAKE 1 TABLET BY MOUTH TWICE A DAY WITH MEALS       Biguanide Agents Passed - 1/4/2023  2:55 PM        Passed - Patient is age 10 or older        Passed - Patient has documented A1c within the specified period of time.     If HgbA1C is 8 or greater, it needs to be on file within the past 3 months.  If less than 8, must be on file within the past 6 months.     Recent Labs   Lab Test 09/28/22  1528   A1C 6.6*             Passed - Patient's CR is NOT>1.4 OR Patient's EGFR is NOT<45 within past 12 mos.     Recent Labs   Lab Test 09/28/22  1528 10/20/21  1442 02/02/21  0957   GFRESTIMATED >90   < > >90   GFRESTBLACK  --   --  >90    < > = values in this interval not displayed.       Recent Labs   Lab Test 09/28/22  1528   CR 0.89             Passed - Patient does NOT have a diagnosis of CHF.        Passed - Medication is active on med list        Passed - Recent (6 mo) or future (30 days) visit within the authorizing provider's specialty     Patient had office visit in the last 6 months or has a visit in the next 30 days with authorizing provider or within the authorizing provider's specialty.  See \"Patient Info\" tab in inbasket, or \"Choose Columns\" in Meds & Orders section of the refill encounter.                 Oliverio Fisher RN 01/04/23 2:55 PM  "

## 2023-02-01 ENCOUNTER — OFFICE VISIT (OUTPATIENT)
Dept: INTERNAL MEDICINE | Facility: CLINIC | Age: 68
End: 2023-02-01
Payer: COMMERCIAL

## 2023-02-01 VITALS
BODY MASS INDEX: 46.65 KG/M2 | HEIGHT: 69 IN | RESPIRATION RATE: 20 BRPM | SYSTOLIC BLOOD PRESSURE: 132 MMHG | HEART RATE: 95 BPM | TEMPERATURE: 97.2 F | OXYGEN SATURATION: 100 % | DIASTOLIC BLOOD PRESSURE: 78 MMHG | WEIGHT: 315 LBS

## 2023-02-01 DIAGNOSIS — R29.6 FALLS FREQUENTLY: ICD-10-CM

## 2023-02-01 DIAGNOSIS — I42.9 CARDIOMYOPATHY, UNSPECIFIED TYPE (H): ICD-10-CM

## 2023-02-01 DIAGNOSIS — E66.01 MORBID OBESITY (H): ICD-10-CM

## 2023-02-01 DIAGNOSIS — Z13.220 SCREENING FOR HYPERLIPIDEMIA: ICD-10-CM

## 2023-02-01 DIAGNOSIS — I48.91 ATRIAL FIBRILLATION, UNSPECIFIED TYPE (H): ICD-10-CM

## 2023-02-01 DIAGNOSIS — E11.51 TYPE 2 DIABETES MELLITUS WITH DIABETIC PERIPHERAL ANGIOPATHY WITHOUT GANGRENE, WITHOUT LONG-TERM CURRENT USE OF INSULIN (H): Primary | ICD-10-CM

## 2023-02-01 LAB
CHOLEST SERPL-MCNC: 158 MG/DL
HBA1C MFR BLD: 6.3 % (ref 0–5.6)
HDLC SERPL-MCNC: 58 MG/DL
LDLC SERPL CALC-MCNC: 83 MG/DL
NONHDLC SERPL-MCNC: 100 MG/DL
TRIGL SERPL-MCNC: 87 MG/DL
TSH SERPL DL<=0.005 MIU/L-ACNC: 1.39 UIU/ML (ref 0.3–4.2)

## 2023-02-01 PROCEDURE — 36415 COLL VENOUS BLD VENIPUNCTURE: CPT | Performed by: INTERNAL MEDICINE

## 2023-02-01 PROCEDURE — 83036 HEMOGLOBIN GLYCOSYLATED A1C: CPT | Performed by: INTERNAL MEDICINE

## 2023-02-01 PROCEDURE — 84443 ASSAY THYROID STIM HORMONE: CPT | Performed by: INTERNAL MEDICINE

## 2023-02-01 PROCEDURE — 99215 OFFICE O/P EST HI 40 MIN: CPT | Performed by: INTERNAL MEDICINE

## 2023-02-01 PROCEDURE — 80061 LIPID PANEL: CPT | Performed by: INTERNAL MEDICINE

## 2023-02-01 NOTE — PROGRESS NOTES
Assessment & Plan     (E11.51) Type 2 diabetes mellitus with diabetic peripheral angiopathy without gangrene, without long-term current use of insulin (H)  (primary encounter diagnosis)  Comment: unknown control--may be a bit worse as he has started to drink a bit more etoh lately.  Plan: TSH WITH FREE T4 REFLEX, Lipid panel reflex to         direct LDL Non-fasting, blood glucose (NO BRAND        SPECIFIED) lancets standard, Hemoglobin A1c        Recheck labs. Adjust medication as needed  Eye exam up to date  See me again in 4-6 months.    (I42.9) Cardiomyopathy, unspecified type (H)  Comment: EF 45-50% per last report. etoh likely. Minimal CAD  Plan: furosemide (LASIX) 40 MG tablet, Comprehensive         metabolic panel        Follows with cardiology. Stable.    (I48.91) Atrial fibrillation, unspecified type (H)  Comment: permanent  Plan: sees cards. EF 45-50%. Rate control. On Eliquis.  Some concerns with his recent falling. See plan below with physical therapy  If there are still issues with balance or falling, then may need to be considered for the Watchman if he is a candidate.    (E66.01) Morbid obesity (H)  Comment: no change  Plan: encouraged him to continue to work on this--etoh intake a big part admittedly.    (R29.6) Falls frequently  Comment: two falls in the last month or so  Plan: Physical Therapy Referral        Although he was not in favor of it, his wife wanted to pursue this as well.  Referral placed.                   Return in about 4 months (around 6/1/2023) for Follow up, with me.\    Total time used in face-to-face, coordination or care and documentation is equal to 55 minutes.     Jeff Martinez MD  [facility]   Demetri is a 67 year old accompanied by his spouse, presenting for the following health issues:  Recheck Medication (Follow up on diabetic and liver. Pt fell twice within the past 2-3 days. Would like a referral to Physical Therapy.  "Would like a walker- Travelling to [place] in March.)      History of Present Illness       Diabetes:   He presents for follow up of diabetes.  He is not checking blood glucose. He has no concerns regarding his diabetes at this time.  He is having numbness in feet and redness, sores, or blisters on feet. The patient has had a diabetic eye exam in the last 12 months. Eye exam performed on October 2022. Location of last eye exam Dr Bowman.        He eats 2-3 servings of fruits and vegetables daily.He consumes 2 sweetened beverage(s) daily.He exercises with enough effort to increase his heart rate 9 or less minutes per day.  He exercises with enough effort to increase his heart rate 7 days per week. He is missing 2 dose(s) of medications per week.  He is not taking prescribed medications regularly due to other.       Diabetic and liver Follow up. Pt also fell 2x within 2-3 days. Med refills. PT referral      Review of Systems         Objective    /78   Pulse 95   Temp 97.2  F (36.2  C) (Tympanic)   Resp 20   Ht 1.753 m (5' 9\")   Wt 148.1 kg (326 lb 9.6 oz)   SpO2 100%   BMI 48.23 kg/m    Body mass index is 48.23 kg/m .  Physical Exam                       "

## 2023-02-01 NOTE — PATIENT INSTRUCTIONS

## 2023-02-07 ENCOUNTER — DOCUMENTATION ONLY (OUTPATIENT)
Dept: OTHER | Facility: CLINIC | Age: 68
End: 2023-02-07
Payer: COMMERCIAL

## 2023-02-27 ENCOUNTER — THERAPY VISIT (OUTPATIENT)
Dept: PHYSICAL THERAPY | Facility: CLINIC | Age: 68
End: 2023-02-27
Attending: INTERNAL MEDICINE
Payer: COMMERCIAL

## 2023-02-27 DIAGNOSIS — R29.6 FALLS FREQUENTLY: ICD-10-CM

## 2023-02-27 PROCEDURE — 97162 PT EVAL MOD COMPLEX 30 MIN: CPT | Mod: GP | Performed by: PHYSICAL THERAPIST

## 2023-02-27 PROCEDURE — 95992 CANALITH REPOSITIONING PROC: CPT | Mod: GP | Performed by: PHYSICAL THERAPIST

## 2023-02-27 NOTE — DISCHARGE INSTRUCTIONS
Recommend bariatric rollator. I requested the order from Dr. Martinez and you can  at a [facility] location  Recommend using the cane inside and walker outside  Recommend looking into Silver Sneakers at the [facility]- using stationary bike for exercise  Positive findings for L horizontal canal BPPV today. Will reassess next week after treatment completed today    Yesenia Addison PT, DPT  Physical Therapist  [facility] - [address]  [address] [place] [address]  [email or web address]  Appointments: [phone]

## 2023-02-28 NOTE — PROGRESS NOTES
02/27/23 1100   Quick Adds   Quick Adds Vestibular Eval   Type of Visit Initial OP PT Evaluation   General Information   Start of Care Date 02/27/23   Referring Physician Jeff Martinez MD   Orders Evaluate and Treat as Indicated   Order Date 02/01/23   Medical Diagnosis Falls frequently   Onset of illness/injury or Date of Surgery 02/01/23   Precautions/Limitations fall precautions   Surgical/Medical history reviewed Yes   Pertinent history of current problem (include personal factors and/or comorbidities that impact the POC) Patient reports multiple falls- getting outside base of support, catching foot. Patient requires lift assist with the majority of falls, but was able to get up with 1 person assist. Patient reports in general has sedentary life. Patient reports today feels more off balance because of his blood sugar being high (170). Patient does report recent vertigo episode when getting out of bed- room spinning. PMH: DM type II, cariomyopathy, A-fib, obesity   Pertinent Visual History  Wears glasses   Prior level of function comment Prior to falls- increased mobility / decreased fear of falling   Patient role/Employment history Retired   Living environment Apartment/condo   Current Assistive Devices Standard Cane   Assistive Devices Comments SEC outside occasionally   Patient/Family Goals Statement Improve stability   Fall Risk Screen   Fall screen completed by PT   Have you fallen 2 or more times in the past year? Yes   Have you fallen and had an injury in the past year? Yes   Is patient a fall risk? Yes   Fall screen comments Gait speed less than 1.0 m/s   Cognitive Status Examination   Orientation orientation to person, place and time   Level of Consciousness alert   Follows Commands and Answers Questions 100% of the time;able to follow multistep instructions   Personal Safety and Judgment intact   Memory intact   Posture   Posture Forward head position   Range of Motion (ROM)   ROM Comment  Decreased throughout LEs- mild to moderate limitations   Bed Mobility   Bed Mobility Comments Mod assist for upward force generation at trunk   Transfer Skills   Transfer Comments SPT with SEC modified independent- increased time to complete   Gait   Gait Comments Patient ambulates with SEC modified independent- increased SILVIA, lateral sway, reduced sunny   Gait Special Tests   Gait Special Tests 25 FOOT TIMED WALK   Gait Special Tests 25 Foot Timed Walk   Comments 10MWT: 0.6 m/s without device   Balance   Balance Comments Romberg EO x 30 seconds   Sensory Examination   Sensory Perception Comments BLE neuropathy known   Cervicogenic Screen   Neck ROM reduced in all directions- painfree   Infrared Goggle Exam or Frenzel Lense Exam   Vestibular Suppressant in Last 24 Hours? No   Exam completed with Infrared Goggles   Spontaneous Nystagmus Negative   Gaze Evoked Nystagmus Negative   Positional Testing comments sidelying test due to body habitus   Roderick-Hallpike (right) Horizontal L   Roderick-Hallpike (right) comments in sidelying and when coming back up to sitting. lasts ~25 seconds, mild symptoms reported   [place]-Hallpike (Left) Horizontal R   Roderick-Hallpike (left) comments in sidelying and when coming back up to sitting. lasts ~25 seconds. no symptoms reported   BPPV Canal(s) L Horizontal   BPPV Type Cupulolithasis   Other Infrared Goggle Exam or Frenzel Lense Exam Comments possible short arm canalithiasis due to duration of nystagmus   Planned Therapy Interventions   Planned Therapy Interventions balance training;other (see comments);neuromuscular re-education  (CRM, equipment management)   Clinical Impression   Criteria for Skilled Therapeutic Interventions Met yes, treatment indicated   PT Diagnosis sensory selectiond deficit   Influenced by the following impairments sensation, balance, positive findings on positional tests, posture, ROM   Functional limitations due to impairments decreased participation in daily  activities, increased fall risk, cardiovascular deconditioning   Clinical Presentation Evolving/Changing   Clinical Presentation Rationale personal factors, body systems involved   Clinical Decision Making (Complexity) Moderate complexity   Therapy Frequency 1 time/week   Predicted Duration of Therapy Intervention (days/wks) 2-3 weeks   Risk & Benefits of therapy have been explained Yes   Patient, Family & other staff in agreement with plan of care Yes   Clinical Impression Comments Patient is a 68 year old male who presents to OP PT with reports of frequent falls and overall decreased activity participation. Patient demonstrates impaired sensory integration, findings correlated with L horizontal canal BPPV (most likely short arm canalithiasis) and would benefit from skilled PT services for functional mobility upgrading and to decrease fall risk.   Education Assessment   Preferred Learning Style Demonstration;Pictures/video   Barriers to Learning No barriers   GOALS   PT Eval Goals 1;2;3;4   Goal 1   Goal Identifier Dizziness   Goal Description Patient will deny vertigo with all position changes and demonstrate negative findings on all positional testing to decrease fall risk associated with BPPV   Target Date 05/27/23   Goal 2   Goal Identifier Gait speed   Goal Description Patient will ambulate with least restrictive device at least 0.8 m/s for safe household and limited community ambulator status   Target Date 05/27/23   Goal 3   Goal Identifier TUG   Goal Description Patient will complete the timed up and go with least restrictive device in less than 13.5 seconds to decrease fall risk   Target Date 05/27/23   Goal 4   Goal Identifier HEP   Goal Description Patient will be independent home exercise program for maintenance of therapy gains at discharge   Target Date 05/27/23   Total Evaluation Time   PT Krystal, Moderate Complexity Minutes (52197) 35     Yesenia Addison PT, DPT  Physical Therapist  [facility]   [facility] - [address]  [address] [place] [address]  [email or web address]  Appointments: [phone]

## 2023-03-06 ENCOUNTER — THERAPY VISIT (OUTPATIENT)
Dept: PHYSICAL THERAPY | Facility: CLINIC | Age: 68
End: 2023-03-06
Attending: INTERNAL MEDICINE
Payer: COMMERCIAL

## 2023-03-06 DIAGNOSIS — R29.6 FALLS FREQUENTLY: Primary | ICD-10-CM

## 2023-03-06 PROCEDURE — 97112 NEUROMUSCULAR REEDUCATION: CPT | Mod: GP | Performed by: PHYSICAL THERAPIST

## 2023-03-23 DIAGNOSIS — I10 HYPERTENSION GOAL BP (BLOOD PRESSURE) < 130/80: ICD-10-CM

## 2023-03-23 RX ORDER — METOPROLOL TARTRATE 100 MG
100 TABLET ORAL 2 TIMES DAILY
Qty: 180 TABLET | Refills: 1 | Status: SHIPPED | OUTPATIENT
Start: 2023-03-23 | End: 2023-07-14

## 2023-04-06 DIAGNOSIS — E11.9 TYPE 2 DIABETES MELLITUS WITHOUT COMPLICATION, WITHOUT LONG-TERM CURRENT USE OF INSULIN (H): ICD-10-CM

## 2023-04-06 NOTE — TELEPHONE ENCOUNTER
"Last Written Prescription Date:  1/4/2023  Last Fill Quantity: 180,  # refills: 0   Last office visit provider:  2/1/2023     Requested Prescriptions   Pending Prescriptions Disp Refills     metFORMIN (GLUCOPHAGE) 1000 MG tablet [Pharmacy Med Name: METFORMIN HCL 1,000 MG TABLET] 180 tablet 0     Sig: TAKE 1 TABLET BY MOUTH TWICE A DAY WITH MEALS       Biguanide Agents Passed - 4/6/2023  1:11 AM        Passed - Patient is age 10 or older        Passed - Patient has documented A1c within the specified period of time.     If HgbA1C is 8 or greater, it needs to be on file within the past 3 months.  If less than 8, must be on file within the past 6 months.     Recent Labs   Lab Test 02/01/23  1455   A1C 6.3*             Passed - Patient's CR is NOT>1.4 OR Patient's EGFR is NOT<45 within past 12 mos.     Recent Labs   Lab Test 09/28/22  1528 10/20/21  1442 02/02/21  0957   GFRESTIMATED >90   < > >90   GFRESTBLACK  --   --  >90    < > = values in this interval not displayed.       Recent Labs   Lab Test 09/28/22  1528   CR 0.89             Passed - Patient does NOT have a diagnosis of CHF.        Passed - Medication is active on med list        Passed - Recent (6 mo) or future (30 days) visit within the authorizing provider's specialty     Patient had office visit in the last 6 months or has a visit in the next 30 days with authorizing provider or within the authorizing provider's specialty.  See \"Patient Info\" tab in inbasket, or \"Choose Columns\" in Meds & Orders section of the refill encounter.                 Sophie Enriquez RN 04/06/23 3:04 PM  "

## 2023-07-14 ENCOUNTER — OFFICE VISIT (OUTPATIENT)
Dept: CARDIOLOGY | Facility: CLINIC | Age: 68
End: 2023-07-14
Payer: COMMERCIAL

## 2023-07-14 VITALS
BODY MASS INDEX: 45.1 KG/M2 | HEART RATE: 97 BPM | HEIGHT: 70 IN | SYSTOLIC BLOOD PRESSURE: 126 MMHG | OXYGEN SATURATION: 98 % | DIASTOLIC BLOOD PRESSURE: 80 MMHG | WEIGHT: 315 LBS

## 2023-07-14 DIAGNOSIS — I10 HYPERTENSION GOAL BP (BLOOD PRESSURE) < 130/80: ICD-10-CM

## 2023-07-14 DIAGNOSIS — I42.8 OTHER CARDIOMYOPATHY (H): ICD-10-CM

## 2023-07-14 DIAGNOSIS — I48.21 PERMANENT ATRIAL FIBRILLATION (H): Primary | ICD-10-CM

## 2023-07-14 PROCEDURE — 99214 OFFICE O/P EST MOD 30 MIN: CPT | Performed by: INTERNAL MEDICINE

## 2023-07-14 RX ORDER — METOPROLOL TARTRATE 100 MG
TABLET ORAL
Qty: 180 TABLET | Refills: 3 | COMMUNITY
Start: 2023-07-14 | End: 2023-10-06
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
## 2023-07-26 ENCOUNTER — OFFICE VISIT (OUTPATIENT)
Dept: INTERNAL MEDICINE | Facility: CLINIC | Age: 68
End: 2023-07-26
Payer: COMMERCIAL

## 2023-07-26 ENCOUNTER — MYC MEDICAL ADVICE (OUTPATIENT)
Dept: INTERNAL MEDICINE | Facility: CLINIC | Age: 68
End: 2023-07-26

## 2023-07-26 VITALS
HEART RATE: 108 BPM | OXYGEN SATURATION: 98 % | WEIGHT: 315 LBS | SYSTOLIC BLOOD PRESSURE: 132 MMHG | DIASTOLIC BLOOD PRESSURE: 74 MMHG | TEMPERATURE: 98.4 F | BODY MASS INDEX: 46.65 KG/M2 | RESPIRATION RATE: 16 BRPM | HEIGHT: 69 IN

## 2023-07-26 DIAGNOSIS — I10 ESSENTIAL HYPERTENSION, BENIGN: ICD-10-CM

## 2023-07-26 DIAGNOSIS — E11.51 TYPE 2 DIABETES MELLITUS WITH DIABETIC PERIPHERAL ANGIOPATHY WITHOUT GANGRENE, WITHOUT LONG-TERM CURRENT USE OF INSULIN (H): Primary | ICD-10-CM

## 2023-07-26 DIAGNOSIS — E11.9 TYPE 2 DIABETES MELLITUS WITHOUT COMPLICATION, WITHOUT LONG-TERM CURRENT USE OF INSULIN (H): Primary | ICD-10-CM

## 2023-07-26 LAB
ALBUMIN SERPL BCG-MCNC: 3.8 G/DL (ref 3.5–5.2)
ALP SERPL-CCNC: 103 U/L (ref 40–129)
ALT SERPL W P-5'-P-CCNC: 25 U/L (ref 0–70)
ANION GAP SERPL CALCULATED.3IONS-SCNC: 11 MMOL/L (ref 7–15)
AST SERPL W P-5'-P-CCNC: 44 U/L (ref 0–45)
BILIRUB SERPL-MCNC: 1.6 MG/DL
BUN SERPL-MCNC: 11.9 MG/DL (ref 8–23)
CALCIUM SERPL-MCNC: 9 MG/DL (ref 8.8–10.2)
CHLORIDE SERPL-SCNC: 96 MMOL/L (ref 98–107)
CREAT SERPL-MCNC: 0.88 MG/DL (ref 0.67–1.17)
DEPRECATED HCO3 PLAS-SCNC: 26 MMOL/L (ref 22–29)
GFR SERPL CREATININE-BSD FRML MDRD: >90 ML/MIN/1.73M2
GLUCOSE SERPL-MCNC: 225 MG/DL (ref 70–99)
HBA1C MFR BLD: 7.9 % (ref 0–5.6)
POTASSIUM SERPL-SCNC: 4.2 MMOL/L (ref 3.4–5.3)
PROT SERPL-MCNC: 7.3 G/DL (ref 6.4–8.3)
SODIUM SERPL-SCNC: 133 MMOL/L (ref 136–145)

## 2023-07-26 PROCEDURE — 36415 COLL VENOUS BLD VENIPUNCTURE: CPT | Performed by: INTERNAL MEDICINE

## 2023-07-26 PROCEDURE — 83036 HEMOGLOBIN GLYCOSYLATED A1C: CPT | Performed by: INTERNAL MEDICINE

## 2023-07-26 PROCEDURE — 80053 COMPREHEN METABOLIC PANEL: CPT | Performed by: INTERNAL MEDICINE

## 2023-07-26 PROCEDURE — 99214 OFFICE O/P EST MOD 30 MIN: CPT | Performed by: INTERNAL MEDICINE

## 2023-07-26 ASSESSMENT — PAIN SCALES - GENERAL: PAINLEVEL: MODERATE PAIN (5)

## 2023-07-26 NOTE — PROGRESS NOTES
"  Assessment & Plan     (E11.51) Type 2 diabetes mellitus with diabetic peripheral angiopathy without gangrene, without long-term current use of insulin (H)  (primary encounter diagnosis)  Comment: likely higher numbers as earlier this summer he was drinking etoh again. Also about a month ago stopped the Ozempic, in part due to concerns of adverse potential risks. He did not think it was helping much in either his sugar control or weight loss--which may have been true  Plan: HEMOGLOBIN A1C        Recheck lab today. If above 7, then likely will restart glimepiride which he had been on in the past before Ozempic. He will do his best to abstain from alcohol and continue to work on his diet. He is able to ambulate better with his seated walker and he was encouraged to do this as much as possible.  Will be due for eye exam this fall  Plan to see me again in 4 months for his physical    (I10) Essential hypertension, benign  Comment: controlled  Plan: Comprehensive metabolic panel        Will plan to continue on previous medication without changes              BMI:   Estimated body mass index is 47.62 kg/m  as calculated from the following:    Height as of this encounter: 1.753 m (5' 9\").    Weight as of this encounter: 146.3 kg (322 lb 8 oz).   Weight management plan: Discussed healthy diet and exercise guidelines        Jeff Martinez MD  [facility]   Demetri is a 68 year old, presenting for the following health issues:  Recheck Medication and Diabetes (Follow up)      7/26/2023     3:03 PM   Additional Questions   Roomed by aman barnes       History of Present Illness       Diabetes:   He presents for follow up of diabetes.  He is checking home blood glucose a few times a month.   He checks blood glucose before meals.  Blood glucose is sometimes over 200 and never under 70. He is aware of hypoglycemia symptoms including dizziness.    He has no concerns regarding his diabetes at " "this time.  He is having numbness in feet.            Reason for visit:  Follow up for balance issues    He eats 2-3 servings of fruits and vegetables daily.He consumes 2 sweetened beverage(s) daily.He exercises with enough effort to increase his heart rate 9 or less minutes per day.  He exercises with enough effort to increase his heart rate 3 or less days per week.   He is taking medications regularly.       Diabetes Follow-up    How often are you checking your blood sugar? One time daily  What time of day are you checking your blood sugars (select all that apply)?   Sometimes before sometimes after  Have you had any blood sugars above 200?  Yes 266  Have you had any blood sugars below 70?  No  What symptoms do you notice when your blood sugar is low?  None  What concerns do you have today about your diabetes? None   Do you have any of these symptoms? (Select all that apply)  Numbness in feet, Burning in feet, and Redness, sores, or blisters on feet      BP Readings from Last 2 Encounters:   07/26/23 132/74   07/14/23 126/80     Hemoglobin A1C (%)   Date Value   02/01/2023 6.3 (H)   09/28/2022 6.6 (H)   02/02/2021 9.1 (H)   09/17/2020 7.8 (H)     LDL Cholesterol Calculated (mg/dL)   Date Value   02/01/2023 83   10/20/2021 96   05/20/2020 69   02/26/2020 66               Review of Systems         Objective    /74 (BP Location: Left arm, Patient Position: Sitting, Cuff Size: Adult Large)   Pulse 108   Temp 98.4  F (36.9  C)   Resp 16   Ht 1.753 m (5' 9\")   Wt 146.3 kg (322 lb 8 oz)   SpO2 98%   BMI 47.62 kg/m    Body mass index is 47.62 kg/m .  Physical Exam                       Answers submitted by the patient for this visit:  Diabetes Visit (Submitted on 7/21/2023)  Chief Complaint: Chronic problems general questions HPI Form  Frequency of checking blood sugars:: a few times a month  What time of day are you checking your blood sugars : before meals  Have you had any blood sugars above 200?: " Yes  Have you had any blood sugars below 70?: No  Hypoglycemia symptoms:: dizziness  Diabetic concerns:: none  Paraesthesia present:: numbness in feet  General Questionnaire (Submitted on 7/21/2023)  Chief Complaint: Chronic problems general questions HPI Form  What is the reason for your visit today? : Follow up for balance issues  How many servings of fruits and vegetables do you eat daily?: 2-3  On average, how many sweetened beverages do you drink each day (Examples: soda, juice, sweet tea, etc.  Do NOT count diet or artificially sweetened beverages)?: 2  How many minutes a day do you exercise enough to make your heart beat faster?: 9 or less  How many days a week do you exercise enough to make your heart beat faster?: 3 or less  How many days per week do you miss taking your medication?: 0

## 2023-07-27 RX ORDER — GLIMEPIRIDE 4 MG/1
4 TABLET ORAL
Qty: 90 TABLET | Refills: 3 | Status: SHIPPED | OUTPATIENT
Start: 2023-07-27 | End: 2024-09-16

## 2023-07-31 ENCOUNTER — HOSPITAL ENCOUNTER (OUTPATIENT)
Dept: CARDIOLOGY | Facility: CLINIC | Age: 68
Discharge: HOME | End: 2023-07-31
Attending: INTERNAL MEDICINE | Admitting: INTERNAL MEDICINE
Payer: COMMERCIAL

## 2023-07-31 DIAGNOSIS — I48.21 PERMANENT ATRIAL FIBRILLATION (H): ICD-10-CM

## 2023-07-31 LAB — LVEF ECHO: NORMAL

## 2023-07-31 PROCEDURE — 999N000208 ECHOCARDIOGRAM COMPLETE

## 2023-07-31 PROCEDURE — 255N000002 HC RX 255 OP 636: Performed by: INTERNAL MEDICINE

## 2023-07-31 PROCEDURE — 93306 TTE W/DOPPLER COMPLETE: CPT | Mod: 26 | Performed by: INTERNAL MEDICINE

## 2023-07-31 RX ADMIN — HUMAN ALBUMIN MICROSPHERES AND PERFLUTREN 3 ML: 10; .22 INJECTION, SOLUTION INTRAVENOUS at 15:38

## 2023-09-01 ENCOUNTER — MYC MEDICAL ADVICE (OUTPATIENT)
Dept: INTERNAL MEDICINE | Facility: CLINIC | Age: 68
End: 2023-09-01
Payer: COMMERCIAL

## 2023-09-07 ENCOUNTER — NURSE TRIAGE (OUTPATIENT)
Dept: INTERNAL MEDICINE | Facility: CLINIC | Age: 68
End: 2023-09-07
Payer: COMMERCIAL

## 2023-09-07 NOTE — TELEPHONE ENCOUNTER
That is fine. I would recommend a BMP in the next week or so if he continues to use this dose.  He could contact us again if that is the case    Jeff Martinez MD on 9/7/2023 at 11:37 AM

## 2023-09-07 NOTE — TELEPHONE ENCOUNTER
Spoke with patient in regards to My Chart message from 9/1.    Patient states he had noticed his right leg was weeping fluid that would soak through his support stockings/pants. Denies pain, tenderness, redness, increased swelling, sores or any signs of infection. States the weeping was his only symptom and has since ceased as he had increased his dosage of furosemide. States was taking 1.5 tablets (60 mg) daily over the weekend and is planning on doing 1.25 tablets (50 mg) to see if this will continue to be effective.

## 2023-09-07 NOTE — TELEPHONE ENCOUNTER
Spoke with patient and relayed information below from Dr Martinez. Patient verbalized understanding and states he will call next week with updates and schedule the lab appointment if needed.

## 2023-09-11 ENCOUNTER — TELEPHONE (OUTPATIENT)
Dept: INTERNAL MEDICINE | Facility: CLINIC | Age: 68
End: 2023-09-11

## 2023-09-11 NOTE — TELEPHONE ENCOUNTER
General Call      Reason for Call:  pt calling in regarding how he is doing on his water pill  Pt is ;now taking 1/1/2 tablet for 2 days and now is back to 1 tab daily 40mg and it is working well    FYI    What are your questions or concerns:  n/a    Date of last appointment with provider: n/a    Could we send this information to you in [facility] or would you prefer to receive a phone call?:   Patient would prefer a phone call   Okay to leave a detailed message?: Yes at Other phone number:  [phone]

## 2023-09-12 NOTE — TELEPHONE ENCOUNTER
Attempted to reach patient, no answer.    Per chart review, Dr Martinez recommended patient have BMP drawn if continuing on medication:    9/7/23 I would recommend a BMP in the next week or so if he continues to use this dose.     Patient will need lab ordered and appointment scheduled.

## 2023-10-06 DIAGNOSIS — L40.9 PSORIASIS OF SCALP: ICD-10-CM

## 2023-10-06 DIAGNOSIS — I10 HYPERTENSION GOAL BP (BLOOD PRESSURE) < 130/80: ICD-10-CM

## 2023-10-06 RX ORDER — METOPROLOL TARTRATE 100 MG
TABLET ORAL
Qty: 225 TABLET | Refills: 3 | Status: SHIPPED | OUTPATIENT
Start: 2023-10-06

## 2023-10-06 RX ORDER — CLOBETASOL PROPIONATE 0.5 MG/G
CREAM TOPICAL
Qty: 30 G | Refills: 1 | Status: SHIPPED | OUTPATIENT
Start: 2023-10-06 | End: 2024-08-28

## 2023-10-07 DIAGNOSIS — I10 HYPERTENSION GOAL BP (BLOOD PRESSURE) < 130/80: ICD-10-CM

## 2023-10-07 DIAGNOSIS — E78.00 HYPERCHOLESTEROLEMIA: ICD-10-CM

## 2023-10-10 RX ORDER — LISINOPRIL 5 MG/1
5 TABLET ORAL 2 TIMES DAILY
Qty: 180 TABLET | Refills: 0 | Status: SHIPPED | OUTPATIENT
Start: 2023-10-10

## 2023-10-10 RX ORDER — ATORVASTATIN CALCIUM 10 MG/1
10 TABLET, FILM COATED ORAL DAILY
Qty: 90 TABLET | Refills: 0 | Status: SHIPPED | OUTPATIENT
Start: 2023-10-10

## 2023-11-25 ENCOUNTER — HEALTH MAINTENANCE LETTER (OUTPATIENT)
Age: 68
End: 2023-11-25

## 2023-12-03 ASSESSMENT — ENCOUNTER SYMPTOMS
WEAKNESS: 0
HEADACHES: 0
MYALGIAS: 0
DIZZINESS: 1
DYSURIA: 0
EYE PAIN: 0
FREQUENCY: 1
JOINT SWELLING: 0
SORE THROAT: 0
ABDOMINAL PAIN: 0
NERVOUS/ANXIOUS: 1
PALPITATIONS: 0
FEVER: 0
CONSTIPATION: 0
HEARTBURN: 0
NAUSEA: 0
CHILLS: 1
COUGH: 0
SHORTNESS OF BREATH: 0
DIARRHEA: 0
HEMATURIA: 0
ARTHRALGIAS: 0
PARESTHESIAS: 1
HEMATOCHEZIA: 0

## 2023-12-03 ASSESSMENT — ACTIVITIES OF DAILY LIVING (ADL): CURRENT_FUNCTION: NO ASSISTANCE NEEDED

## 2023-12-07 ENCOUNTER — OFFICE VISIT (OUTPATIENT)
Dept: INTERNAL MEDICINE | Facility: CLINIC | Age: 68
End: 2023-12-07
Attending: INTERNAL MEDICINE
Payer: COMMERCIAL

## 2023-12-07 VITALS
OXYGEN SATURATION: 99 % | TEMPERATURE: 98.8 F | SYSTOLIC BLOOD PRESSURE: 130 MMHG | BODY MASS INDEX: 46.65 KG/M2 | HEART RATE: 86 BPM | WEIGHT: 315 LBS | DIASTOLIC BLOOD PRESSURE: 81 MMHG | RESPIRATION RATE: 16 BRPM | HEIGHT: 69 IN

## 2023-12-07 DIAGNOSIS — Z00.00 MEDICARE ANNUAL WELLNESS VISIT, SUBSEQUENT: Primary | ICD-10-CM

## 2023-12-07 DIAGNOSIS — Z29.11 NEED FOR VACCINATION AGAINST RESPIRATORY SYNCYTIAL VIRUS: ICD-10-CM

## 2023-12-07 DIAGNOSIS — E66.01 MORBID OBESITY (H): ICD-10-CM

## 2023-12-07 DIAGNOSIS — I10 HYPERTENSION GOAL BP (BLOOD PRESSURE) < 130/80: ICD-10-CM

## 2023-12-07 DIAGNOSIS — E11.51 TYPE 2 DIABETES MELLITUS WITH DIABETIC PERIPHERAL ANGIOPATHY WITHOUT GANGRENE, WITHOUT LONG-TERM CURRENT USE OF INSULIN (H): ICD-10-CM

## 2023-12-07 DIAGNOSIS — I42.9 CARDIOMYOPATHY, UNSPECIFIED TYPE (H): ICD-10-CM

## 2023-12-07 DIAGNOSIS — F10.10 EXCESSIVE DRINKING OF ALCOHOL: ICD-10-CM

## 2023-12-07 DIAGNOSIS — E78.00 HYPERCHOLESTEROLEMIA: ICD-10-CM

## 2023-12-07 DIAGNOSIS — Z23 NEED FOR SHINGLES VACCINE: ICD-10-CM

## 2023-12-07 DIAGNOSIS — G47.33 OBSTRUCTIVE SLEEP APNEA SYNDROME: ICD-10-CM

## 2023-12-07 DIAGNOSIS — I48.21 PERMANENT ATRIAL FIBRILLATION (H): ICD-10-CM

## 2023-12-07 LAB
ALBUMIN SERPL BCG-MCNC: 3.7 G/DL (ref 3.5–5.2)
ALP SERPL-CCNC: 102 U/L (ref 40–150)
ALT SERPL W P-5'-P-CCNC: 27 U/L (ref 0–70)
ANION GAP SERPL CALCULATED.3IONS-SCNC: 9 MMOL/L (ref 7–15)
AST SERPL W P-5'-P-CCNC: 43 U/L (ref 0–45)
BILIRUB SERPL-MCNC: 0.8 MG/DL
BUN SERPL-MCNC: 11.3 MG/DL (ref 8–23)
CALCIUM SERPL-MCNC: 8.2 MG/DL (ref 8.8–10.2)
CHLORIDE SERPL-SCNC: 102 MMOL/L (ref 98–107)
CREAT SERPL-MCNC: 0.91 MG/DL (ref 0.67–1.17)
CREAT UR-MCNC: 44.9 MG/DL
DEPRECATED HCO3 PLAS-SCNC: 25 MMOL/L (ref 22–29)
EGFRCR SERPLBLD CKD-EPI 2021: >90 ML/MIN/1.73M2
ERYTHROCYTE [DISTWIDTH] IN BLOOD BY AUTOMATED COUNT: 12.9 % (ref 10–15)
GLUCOSE SERPL-MCNC: 134 MG/DL (ref 70–99)
HBA1C MFR BLD: 6.2 % (ref 0–5.6)
HCT VFR BLD AUTO: 43.1 % (ref 40–53)
HGB BLD-MCNC: 14.5 G/DL (ref 13.3–17.7)
MCH RBC QN AUTO: 33.4 PG (ref 26.5–33)
MCHC RBC AUTO-ENTMCNC: 33.6 G/DL (ref 31.5–36.5)
MCV RBC AUTO: 99 FL (ref 78–100)
MICROALBUMIN UR-MCNC: <12 MG/L
MICROALBUMIN/CREAT UR: NORMAL MG/G{CREAT}
PLATELET # BLD AUTO: 105 10E3/UL (ref 150–450)
POTASSIUM SERPL-SCNC: 4.2 MMOL/L (ref 3.4–5.3)
PROT SERPL-MCNC: 7.1 G/DL (ref 6.4–8.3)
RBC # BLD AUTO: 4.34 10E6/UL (ref 4.4–5.9)
SODIUM SERPL-SCNC: 136 MMOL/L (ref 135–145)
WBC # BLD AUTO: 4.5 10E3/UL (ref 4–11)

## 2023-12-07 PROCEDURE — 85027 COMPLETE CBC AUTOMATED: CPT | Performed by: INTERNAL MEDICINE

## 2023-12-07 PROCEDURE — 83036 HEMOGLOBIN GLYCOSYLATED A1C: CPT | Performed by: INTERNAL MEDICINE

## 2023-12-07 PROCEDURE — 99214 OFFICE O/P EST MOD 30 MIN: CPT | Mod: 25 | Performed by: INTERNAL MEDICINE

## 2023-12-07 PROCEDURE — G0439 PPPS, SUBSEQ VISIT: HCPCS | Performed by: INTERNAL MEDICINE

## 2023-12-07 PROCEDURE — 82043 UR ALBUMIN QUANTITATIVE: CPT | Performed by: INTERNAL MEDICINE

## 2023-12-07 PROCEDURE — 80053 COMPREHEN METABOLIC PANEL: CPT | Performed by: INTERNAL MEDICINE

## 2023-12-07 PROCEDURE — 82570 ASSAY OF URINE CREATININE: CPT | Performed by: INTERNAL MEDICINE

## 2023-12-07 PROCEDURE — 36415 COLL VENOUS BLD VENIPUNCTURE: CPT | Performed by: INTERNAL MEDICINE

## 2023-12-07 RX ORDER — RESPIRATORY SYNCYTIAL VIRUS VACCINE 120MCG/0.5
0.5 KIT INTRAMUSCULAR ONCE
Qty: 1 EACH | Refills: 0 | Status: CANCELLED | OUTPATIENT
Start: 2023-12-07 | End: 2023-12-07

## 2023-12-07 ASSESSMENT — ENCOUNTER SYMPTOMS
DYSURIA: 0
DIZZINESS: 1
CONSTIPATION: 0
NAUSEA: 0
ABDOMINAL PAIN: 0
SORE THROAT: 0
PARESTHESIAS: 1
SHORTNESS OF BREATH: 0
HEARTBURN: 0
JOINT SWELLING: 0
WEAKNESS: 0
NERVOUS/ANXIOUS: 1
HEMATOCHEZIA: 0
COUGH: 0
PALPITATIONS: 0
HEMATURIA: 0
DIARRHEA: 0
HEADACHES: 0
CHILLS: 1
EYE PAIN: 0
MYALGIAS: 0
ARTHRALGIAS: 0
FREQUENCY: 1
FEVER: 0

## 2023-12-07 ASSESSMENT — ACTIVITIES OF DAILY LIVING (ADL): CURRENT_FUNCTION: NO ASSISTANCE NEEDED

## 2023-12-07 NOTE — PROGRESS NOTES
"SUBJECTIVE:   Demetri is a 68 year old, presenting for the following:  Annual Visit and Recheck Medication (Pt is annual visit )        12/7/2023     1:59 PM   Additional Questions   Roomed by mynor   Accompanied by hafsa         12/7/2023     1:59 PM   Patient Reported Additional Medications   Patient reports taking the following new medications no       Are you in the first 12 months of your Medicare coverage?  No    Healthy Habits:     In general, how would you rate your overall health?  Fair    Frequency of exercise:  None    Do you usually eat at least 4 servings of fruit and vegetables a day, include whole grains    & fiber and avoid regularly eating high fat or \"junk\" foods?  Yes    Taking medications regularly:  Yes    Medication side effects:  Lightheadedness    Ability to successfully perform activities of daily living:  No assistance needed    Home Safety:  No safety concerns identified    Hearing Impairment:  No hearing concerns    In the past 6 months, have you been bothered by leaking of urine? Yes    In general, how would you rate your overall mental or emotional health?  Fair    Additional concerns today:  Yes      Today's PHQ-2 Score:       12/7/2023     1:58 PM   PHQ-2 ( 1999 [employer])   Q1: Little interest or pleasure in doing things 0   Q2: Feeling down, depressed or hopeless 0   PHQ-2 Score 0   Q1: Little interest or pleasure in doing things Not at all   Q2: Feeling down, depressed or hopeless Not at all   PHQ-2 Score 0           Have you ever done Advance Care Planning? (For example, a Health Directive, POLST, or a discussion with a medical provider or your loved ones about your wishes): Yes, advance care planning is on file.       Fall risk  Fallen 2 or more times in the past year?: No  Any fall with injury in the past year?: No    Cognitive Screening   1) Repeat 3 items (Leader, Season, Table)    2) Clock draw: NORMAL  3) 3 item recall: Recalls 3 objects  Results: 3 items recalled: COGNITIVE " IMPAIRMENT LESS LIKELY    Mini-CogTM Copyright JUAN Oliva. Licensed by the author for use in [facility]; reprinted with permission ([email or web address]@.[email or web address]). All rights reserved.      Do you have sleep apnea, excessive snoring or daytime drowsiness? : yes    Reviewed and updated as needed this visit by clinical staff   Tobacco  Allergies  Meds              Reviewed and updated as needed this visit by Provider                 Social History     Tobacco Use    Smoking status: Former     Types: Cigarettes     Quit date: 1970     Years since quittin.2    Smokeless tobacco: Never   Substance Use Topics    Alcohol use: Yes     Comment: 2-3 times a week             12/3/2023     9:56 PM   Alcohol Use   Prescreen: >3 drinks/day or >7 drinks/week? Yes   AUDIT SCORE  15     Do you have a current opioid prescription? No  Do you use any other controlled substances or medications that are not prescribed by a provider? None              Current providers sharing in care for this patient include:   Patient Care Team:  Jeff Martinez MD as PCP - General (Internal Medicine)  Yareli Johnson MD as Referring Physician (Family Practice)  Carolyn Burdick MD as MD (Urology)  Melanie Rosales [facility] as Pharmacist (Pharmacist)  Hien George PA as Physician Assistant (Urology)  Bloch, Lauren Turner, [facility] as Pharmacist (Pharmacist)  Chelsea Bird MD as Assigned Heart and Vascular Provider  Jeff Martinez MD as Assigned PCP  Sophie Bourgeois DO as Hospitalist (HOSPITALIST)    The following health maintenance items are reviewed in Epic and correct as of today:  Health Maintenance   Topic Date Due    ANNUAL REVIEW OF HM ORDERS  Never done    RSV VACCINE (Pregnancy & 60+) (1 - 1-dose 60+ series) Never done    HF ACTION PLAN  2021    CBC  2022    DIABETIC FOOT EXAM  10/20/2022    MEDICARE ANNUAL WELLNESS VISIT  2023    MICROALBUMIN  2023    EYE EXAM   "10/01/2023    ZOSTER IMMUNIZATION (2 of 2) 11/03/2023    A1C  01/26/2024    BMP  01/26/2024    LIPID  02/01/2024    DTAP/TDAP/TD IMMUNIZATION (2 - Td or Tdap) 02/05/2024    COLORECTAL CANCER SCREENING  03/31/2024    ALT  07/26/2024    FALL RISK ASSESSMENT  12/07/2024    ADVANCE CARE PLANNING  02/07/2028    TSH W/FREE T4 REFLEX  Completed    HEPATITIS C SCREENING  Completed    PHQ-2 (once per calendar year)  Completed    INFLUENZA VACCINE  Completed    Pneumococcal Vaccine: 65+ Years  Completed    AORTIC ANEURYSM SCREENING (SYSTEM ASSIGNED)  Completed    COVID-19 Vaccine  Completed    IPV IMMUNIZATION  Aged Out    HPV IMMUNIZATION  Aged Out    MENINGITIS IMMUNIZATION  Aged Out    RSV MONOCLONAL ANTIBODY  Aged Out               Review of Systems   Constitutional:  Positive for chills. Negative for fever.   HENT:  Positive for congestion. Negative for ear pain, hearing loss and sore throat.    Eyes:  Negative for pain and visual disturbance.   Respiratory:  Negative for cough and shortness of breath.    Cardiovascular:  Positive for peripheral edema. Negative for chest pain and palpitations.   Gastrointestinal:  Negative for abdominal pain, constipation, diarrhea, heartburn, hematochezia and nausea.   Genitourinary:  Positive for frequency, impotence and urgency. Negative for dysuria, genital sores, hematuria and penile discharge.   Musculoskeletal:  Negative for arthralgias, joint swelling and myalgias.   Skin:  Positive for rash.   Neurological:  Positive for dizziness and paresthesias. Negative for weakness and headaches.   Psychiatric/Behavioral:  Positive for mood changes. The patient is nervous/anxious.          OBJECTIVE:   /81 (BP Location: Left arm, Patient Position: Sitting, Cuff Size: Adult Large)   Pulse 86   Temp 98.8  F (37.1  C) (Tympanic)   Resp 16   Ht 1.74 m (5' 8.5\")   Wt (!) 153 kg (337 lb 4.8 oz)   SpO2 99%   BMI 50.53 kg/m   Estimated body mass index is 50.53 kg/m  as calculated from " "the following:    Height as of this encounter: 1.74 m (5' 8.5\").    Weight as of this encounter: 153 kg (337 lb 4.8 oz).  Physical Exam  GENERAL: healthy, alert and no distress  NEURO: Normal strength and tone, mentation intact and speech normal  PSYCH: mentation appears normal, affect normal/bright  Trace edema in ankles.        ASSESSMENT / PLAN:     (Z00.00) Encounter for Medicare annual wellness exam  (primary encounter diagnosis)  Comment: chronic conditions reviewed  Plan: see discussion below     (E78.00) Hypercholesterolemia  Comment: on statin  Plan: atorvastatin (LIPITOR) 10 MG tablet        Will plan to continue on previous medication without changes      (E66.01) Morbid obesity (H)  Comment: some weight loss-mostly attributed to decreased etoh intak  Plan: encouragement given to continue to cut back, physical activity as tolerated.     (I42.9) Cardiomyopathy, unspecified type (H)  Comment: EF 45-50% per last report. etoh likely. Minimal CAD  Plan: furosemide (LASIX) 40 MG tablet, Comprehensive         metabolic panel        Follows with cardiology. Stable.     (I10) Hypertension goal BP (blood pressure) < 130/80  Comment: contrtolled  Plan: lisinopril (ZESTRIL) 5 MG tablet        Will plan to continue on previous medication without changes      (E11.51,  E11.65) DM (diabetes mellitus) type II uncontrolled, periph vascular disorder (H)  Comment: stable  Plan: Albumin Random Urine Quantitative with Creat         Ratio, Hemoglobin A1c        Recheck today. Suspect there has been some improvement with his etoh volume decrease and weight loss.  See me again in 4 months.  Eye exam and microalb up to date.     (I48.91) Atrial fibrillation, unspecified type (H)  Comment: permanent  Plan: sees cards. EF 45-50%. Rate control. On Eliquis.     SHAHIDA-uses CPAP    (F10.10) Excessive drinking of alcohol  Comment: known concern, 4-5 drinks at least by his report.  Plan: CBC with Platelets        Will continue work to cut " down, discussed with patient and wife.     See again in 4 months, recheck diabetes.          COUNSELING:  Reviewed preventive health counseling, as reflected in patient instructions        He reports that he quit smoking about 53 years ago. His smoking use included cigarettes. He has never used smokeless tobacco.      Appropriate preventive services were discussed with this patient, including applicable screening as appropriate for fall prevention, nutrition, physical activity, Tobacco-use cessation, weight loss and cognition.  Checklist reviewing preventive services available has been given to the patient.    Reviewed patients plan of care and provided an AVS. The Basic Care Plan (routine screening as documented in Health Maintenance) for Demetri meets the Care Plan requirement. This Care Plan has been established and reviewed with the Patient.          Jeff Martinez MD  [facility]    Identified Health Risks:  I have reviewed Opioid Use Disorder and Substance Use Disorder risk factors and made any needed referrals.

## 2024-01-05 DIAGNOSIS — E11.9 TYPE 2 DIABETES MELLITUS WITHOUT COMPLICATION, WITHOUT LONG-TERM CURRENT USE OF INSULIN (H): ICD-10-CM

## 2024-01-12 DIAGNOSIS — I48.91 ATRIAL FIBRILLATION (H): ICD-10-CM

## 2024-02-29 ENCOUNTER — TRANSFERRED RECORDS (OUTPATIENT)
Dept: HEALTH INFORMATION MANAGEMENT | Facility: CLINIC | Age: 69
End: 2024-02-29
Payer: COMMERCIAL

## 2024-04-11 ENCOUNTER — OFFICE VISIT (OUTPATIENT)
Dept: INTERNAL MEDICINE | Facility: CLINIC | Age: 69
End: 2024-04-11
Payer: COMMERCIAL

## 2024-04-11 VITALS
DIASTOLIC BLOOD PRESSURE: 82 MMHG | HEART RATE: 67 BPM | TEMPERATURE: 96.9 F | SYSTOLIC BLOOD PRESSURE: 138 MMHG | RESPIRATION RATE: 16 BRPM | WEIGHT: 315 LBS | OXYGEN SATURATION: 99 % | HEIGHT: 69 IN | BODY MASS INDEX: 46.65 KG/M2

## 2024-04-11 DIAGNOSIS — I48.21 PERMANENT ATRIAL FIBRILLATION (H): ICD-10-CM

## 2024-04-11 DIAGNOSIS — E66.01 MORBID OBESITY (H): ICD-10-CM

## 2024-04-11 DIAGNOSIS — Z12.11 SCREEN FOR COLON CANCER: ICD-10-CM

## 2024-04-11 DIAGNOSIS — I42.9 CARDIOMYOPATHY, UNSPECIFIED TYPE (H): ICD-10-CM

## 2024-04-11 DIAGNOSIS — E11.51 TYPE 2 DIABETES MELLITUS WITH DIABETIC PERIPHERAL ANGIOPATHY WITHOUT GANGRENE, WITHOUT LONG-TERM CURRENT USE OF INSULIN (H): Primary | ICD-10-CM

## 2024-04-11 LAB
CHOLEST SERPL-MCNC: 131 MG/DL
FASTING STATUS PATIENT QL REPORTED: YES
HBA1C MFR BLD: 6.4 % (ref 0–5.6)
HDLC SERPL-MCNC: 50 MG/DL
LDLC SERPL CALC-MCNC: 53 MG/DL
NONHDLC SERPL-MCNC: 81 MG/DL
TRIGL SERPL-MCNC: 141 MG/DL

## 2024-04-11 PROCEDURE — 83036 HEMOGLOBIN GLYCOSYLATED A1C: CPT | Performed by: INTERNAL MEDICINE

## 2024-04-11 PROCEDURE — 36415 COLL VENOUS BLD VENIPUNCTURE: CPT | Performed by: INTERNAL MEDICINE

## 2024-04-11 PROCEDURE — 99214 OFFICE O/P EST MOD 30 MIN: CPT | Performed by: INTERNAL MEDICINE

## 2024-04-11 PROCEDURE — 80061 LIPID PANEL: CPT | Performed by: INTERNAL MEDICINE

## 2024-04-11 PROCEDURE — G2211 COMPLEX E/M VISIT ADD ON: HCPCS | Performed by: INTERNAL MEDICINE

## 2024-04-11 RX ORDER — LANCETS
EACH MISCELLANEOUS
Qty: 100 EACH | Refills: 6 | Status: SHIPPED | OUTPATIENT
Start: 2024-04-11

## 2024-04-11 NOTE — PROGRESS NOTES
"  Assessment & Plan     (E11.51) Type 2 diabetes mellitus with diabetic peripheral angiopathy without gangrene, without long-term current use of insulin (H)  (primary encounter diagnosis)  Comment: has been stable lately, down a few more LBS recently as well. Walking stairs in his house.  Plan: Lipid panel reflex to direct LDL Non-fasting,         Hemoglobin A1c, blood glucose monitoring (NO         BRAND SPECIFIED) meter device kit, blood         glucose (NO BRAND SPECIFIED) test strip, thin         (NO BRAND SPECIFIED) lancets        Repeat labs. Eye exam due. He goes to Dr Bowman (outside of system). They have my card on file to have reports sent to us.  See me in 4 months.    (I48.21) Permanent atrial fibrillation (H)  Comment: Comment: permanent  Plan: sees cards. EF 45-50%. Rate control. On Eliquis.    (I42.9) Cardiomyopathy, unspecified type (H)  Comment: Comment: EF 45-50% per last report. etoh likely. Minimal CAD  Plan: furosemide (LASIX) 40 MG tablet, Comprehensive         metabolic panel        Follows with cardiology. Stable.    (E66.01) Morbid obesity (H)  Comment: some progress over the winter  Plan: encouragement given to continue working toward goals.    (Z12.11) Screen for colon cancer  Comment: due as of this month, last scope 10 years ago. Normal.  Plan: Colonoscopy Screening  Referral        Ordered. May need to be done in hospital due to size. Will await recommendation.    See me again in 4 months.    The longitudinal plan of care for the diagnosis(es)/condition(s) as documented were addressed during this visit. Due to the added complexity in care, I will continue to support Demetri in the subsequent management and with ongoing continuity of care.              BMI  Estimated body mass index is 49.44 kg/m  as calculated from the following:    Height as of this encounter: 1.74 m (5' 8.5\").    Weight as of this encounter: 149.7 kg (330 lb).             Subjective   Demetri is a 69 year old, " "presenting for the following health issues:  Diabetes (Follow up on diabetes, he needs new glucose monitor, the old one came off.)        4/11/2024     2:22 PM   Additional Questions   Roomed by eh   Accompanied by spouse     History of Present Illness       Diabetes:   He presents for follow up of diabetes.  He is checking home blood glucose a few times a month.   He checks blood glucose before meals.  Blood glucose is never over 200 and never under 70. He is aware of hypoglycemia symptoms including shakiness and weakness.   He is concerned about other.   He is having numbness in feet and burning in feet.            He eats 2-3 servings of fruits and vegetables daily.He consumes 0 sweetened beverage(s) daily. He exercises with enough effort to increase his heart rate 5 days per week.                      Objective    /82 (BP Location: Left arm, Patient Position: Sitting, Cuff Size: Adult Regular)   Pulse 67   Temp 96.9  F (36.1  C) (Temporal)   Resp 16   Ht 1.74 m (5' 8.5\")   Wt 149.7 kg (330 lb)   SpO2 99%   BMI 49.44 kg/m    Body mass index is 49.44 kg/m .  Physical Exam               Signed Electronically by: Jeff Martinez MD    "

## 2024-04-19 ENCOUNTER — TELEPHONE (OUTPATIENT)
Dept: GASTROENTEROLOGY | Facility: CLINIC | Age: 69
End: 2024-04-19
Payer: COMMERCIAL

## 2024-04-19 DIAGNOSIS — Z12.11 SPECIAL SCREENING FOR MALIGNANT NEOPLASMS, COLON: Primary | ICD-10-CM

## 2024-04-19 NOTE — TELEPHONE ENCOUNTER
"Endoscopy Scheduling Screen    Have you had a positive Covid test in the last 14 days?  No    What is your communication preference for Instructions and/or Bowel Prep?   MyChart    What insurance is in the chart?  Other:  [employer]ARE    Ordering/Referring Provider: AYESHA LEONE   (If ordering provider performs procedure, schedule with ordering provider unless otherwise instructed. )    BMI: Estimated body mass index is 49.44 kg/m  as calculated from the following:    Height as of 4/11/24: 1.74 m (5' 8.5\").    Weight as of 4/11/24: 149.7 kg (330 lb).     Sedation Ordered  moderate sedation.   If patient BMI > 50 do not schedule in ASC.    If patient BMI > 45 do not schedule at [facility].    Are you taking methadone or Suboxone?  No    Have you had difficulties, pain, or discomfort during past endoscopy procedures?  No    Are you taking any prescription medications for pain 3 or more times per week?   NO, No RN review required.    Do you have a history of malignant hyperthermia?  No    (Females) Are you currently pregnant?   No     Have you been diagnosed or told you have pulmonary hypertension?   No    Do you have an LVAD?  No    Have you been told you have moderate to severe sleep apnea?  Yes (RN Review required for scheduling unless scheduling in Hospital.)    Have you been told you have COPD, asthma, or any other lung disease?  No    Do you have any heart conditions?  Yes     In the past year, have you had any hospitalizations for heart related issues including cardiomyopathy, heart attack, or stent placement?  No    Do you have any implantable devices in your body (pacemaker, ICD)?  No    Do you take nitroglycerine?  No    Have you ever had or are you waiting for an organ transplant?  No. Continue scheduling, no site restrictions.    Have you had a stroke or transient ischemic attack (TIA aka \"mini stroke\" in the last 6 months?   No    Have you been diagnosed with or been told you have cirrhosis of the " "liver?   No    Are you currently on dialysis?   No    Do you need assistance transferring?   No    BMI: Estimated body mass index is 49.44 kg/m  as calculated from the following:    Height as of 4/11/24: 1.74 m (5' 8.5\").    Weight as of 4/11/24: 149.7 kg (330 lb).     Is patients BMI > 40 and scheduling location [facility]?  Yes (If MAC sedation is ordered, schedule PAC eval)    Do you take an injectable medication for weight loss or diabetes (excluding insulin)?  No    Do you take the medication Naltrexone?  No    Do you take blood thinners?  Yes     Are you taking Effient/Prasugrel?  No, you must contact your prescribing provider for direction on holding or bridging with a different medication.       Prep   Are you currently on dialysis or do you have chronic kidney disease?  No    Do you have a diagnosis of diabetes?  Yes (Golytely Prep)    Do you have a diagnosis of cystic fibrosis (CF)?  No    On a regular basis do you go 3 -5 days between bowel movements?  No    BMI > 40?  Yes (Extended Prep)    Preferred Pharmacy:    [facility] [id number] [place] [id number]  Phone: [phone] Fax: [phone]    Final Scheduling Details     Procedure scheduled  Colonoscopy    Surgeon:  DIXIE     Date of procedure:  8/7/24     Pre-OP / PAC:   No - Not required for this site.    Location  [facility] - Per exclusion criteria.    Sedation   Moderate Sedation - Per order.      Patient Reminders:   You will receive a call from a Nurse to review instructions and health history.  This assessment must be completed prior to your procedure.  Failure to complete the Nurse assessment may result in the procedure being cancelled.      On the day of your procedure, please designate an adult(s) who can drive you home stay with you for the next 24 hours. The medicines used in the exam will make you sleepy. You will not be able to drive.      You cannot take public transportation, ride share " services, or non-medical taxi service without a responsible caregiver.  Medical transport services are allowed with the requirement that a responsible caregiver will receive you at your destination.  We require that drivers and caregivers are confirmed prior to your procedure.

## 2024-06-03 ENCOUNTER — TELEPHONE (OUTPATIENT)
Dept: GASTROENTEROLOGY | Facility: CLINIC | Age: 69
End: 2024-06-03
Payer: COMMERCIAL

## 2024-06-03 NOTE — TELEPHONE ENCOUNTER
Caller: Spouse/Iza   Reason for Reschedule/Cancellation (please be detailed, any staff messages or encounters to note?):     Per pt spouse - searching for sooner date       Prior to reschedule please review:  Ordering Provider:    Jeff Martinez MD      Sedation Determined: mod  Does patient have any ASC Exclusions, please identify?: y - adriana + BMI: 50.2      Notes on Cancelled Procedure:  Procedure:Lower Endoscopy [Colonoscopy]   Date: 8/7/2024  Location:[facility]; [address], [place] [address]  Surgeon: Sybil         Rescheduled: Yes   Procedure: Lower Endoscopy [Colonoscopy]  Date: 07/02/2024  Location: [facility]; [facility] [place] [address]   Surgeon: Frankie   Sedation Level Scheduled  moderate          Reason for Sedation Level per order   Prep/Instructions updated and sent: mychart     Does patient need PAC or Pre -Op Rescheduled? : n        Send In - basket message to Panc - Soren Pool if [facility] procedure is canceled or rescheduled: [ N/A, YES or NO] n/a

## 2024-06-04 RX ORDER — BISACODYL 5 MG/1
TABLET, DELAYED RELEASE ORAL
Qty: 4 TABLET | Refills: 0 | Status: ON HOLD | OUTPATIENT
Start: 2024-06-04 | End: 2024-07-02

## 2024-06-04 NOTE — TELEPHONE ENCOUNTER
Extended Golytely Bowel Prep . Instructions were sent via [facility].. Bowel prep was sent 6/4/2024 to  [place] [id number] [place], [place] - [address] [place] [address]       Katie Lucas RN Colorectal Cancer    Division of Gastroenterology at [facility]

## 2024-06-24 ENCOUNTER — TELEPHONE (OUTPATIENT)
Dept: GASTROENTEROLOGY | Facility: CLINIC | Age: 69
End: 2024-06-24
Payer: COMMERCIAL

## 2024-06-24 NOTE — TELEPHONE ENCOUNTER
Pre visit planning completed.      Procedure details:    Patient scheduled for Colonoscopy  on 7/2/24.     Arrival time: 0945. Procedure time 1030    Facility location: [facility]; [address] [place] [address] [place] [address]. Check in location: [address] [facility].     Sedation type: Conscious sedation     Pre op exam needed? N/A    Indication for procedure: Screening      Chart review:     Electronic implanted devices? No    Recent diagnosis of diverticulitis within the last 6 weeks? No    Diabetic? Yes. Diabetic medication HOLDING recommendations: Oral diabetic medications: Yes:  Glimepiride (amaryl): HOLD day of procedure.  Metformin (glucophage): HOLD day of procedure.       Medication review:    Anticoagulants? Yes Apixaban (Eliquis): Recommended HOLD 2 days before procedure.  Consult with your managing provider.    NSAIDS? No NSAID medications per patient's medication list.  RN will verify with pre-assessment call.    Other medication HOLDING recommendations:  N/A      Prep for procedure:     Bowel prep recommendation: Extended Golytely. Bowel prep prescription sent by [facility] nursing team on 6/4/24 to [facility] [id number] [place] [address]  Due to: BMI > 40.  and diabetes.     Prep instructions sent via [employer] by [facility] nursing team on 6/4/24.        Chelsea Banda RN  Endoscopy Procedure Pre Assessment RN  [phone] option 4

## 2024-06-24 NOTE — TELEPHONE ENCOUNTER
Pre assessment completed for upcoming procedure.   (Please see previous telephone encounter notes for complete details)      Procedure details:    Arrival time and facility location reviewed.    Pre op exam needed? N/A    Designated  policy reviewed. Instructed to have someone stay 6 hours post procedure.       Medication review:    Medications reviewed. Please see supporting documentation below. Holding recommendations discussed (if applicable).   Blood thinner/Anti-platelet medication(s):  Apixaban (Eliquis): Recommended HOLD 2 days before procedure.  Consult with your managing provider.  Oral diabetic medication(s): Glimepiride (amaryl): HOLD day of procedure.  Metformin (glucophage): HOLD day of procedure.      Prep for procedure:     Patient stated they have already reviewed the bowel prep instructions and writer answered all patient questions (if applicable). NPO instructions reviewed.    Patient was instructed to call if any questions or concerns arise.       Any additional information needed:  N/A      Patient  and Family member verbalized understanding and had no questions or concerns at this time.      Chelsea Banda RN  Endoscopy Procedure Pre Assessment   [phone] option 4

## 2024-06-27 ENCOUNTER — OFFICE VISIT (OUTPATIENT)
Dept: DERMATOLOGY | Facility: CLINIC | Age: 69
End: 2024-06-27
Payer: COMMERCIAL

## 2024-06-27 DIAGNOSIS — L91.8 INFLAMED SKIN TAG: Primary | ICD-10-CM

## 2024-06-27 DIAGNOSIS — L82.0 SEBORRHEIC KERATOSES, INFLAMED: ICD-10-CM

## 2024-06-27 DIAGNOSIS — D49.2 NEOPLASM OF UNSPECIFIED BEHAVIOR OF BONE, SOFT TISSUE, AND SKIN: ICD-10-CM

## 2024-06-27 DIAGNOSIS — Q82.8 POROKERATOSIS: ICD-10-CM

## 2024-06-27 PROCEDURE — 11102 TANGNTL BX SKIN SINGLE LES: CPT | Mod: GC | Performed by: DERMATOLOGY

## 2024-06-27 PROCEDURE — 88305 TISSUE EXAM BY PATHOLOGIST: CPT | Mod: TC | Performed by: DERMATOLOGY

## 2024-06-27 PROCEDURE — 11200 RMVL SKIN TAGS UP TO&INC 15: CPT | Mod: XS | Performed by: DERMATOLOGY

## 2024-06-27 PROCEDURE — 88305 TISSUE EXAM BY PATHOLOGIST: CPT | Mod: 26 | Performed by: DERMATOLOGY

## 2024-06-27 ASSESSMENT — PAIN SCALES - GENERAL: PAINLEVEL: NO PAIN (0)

## 2024-06-27 NOTE — PATIENT INSTRUCTIONS
WOUND CARE: Wound Care After a Biopsy    How should I care for my wound for the first 24 hours?  If it bleeds, hold direct pressure on the area for 10 minutes  Acetaminophen (TylenolTM) as needed for pain    How should I care for the wound after 24 hours?  Remove the bandage   You may bathe or shower as normal    How do I clean my wound?  Use white petroleum/Vaseline  to keep sutures moist twice daily.   Replace the Bandaid /bandage to keep the wound covered until it is completely healed (not needed in the scalp)    What should I use to clean my wound?   White petroleum jelly (Vaseline )   Bandaids   as needed    How should I care for my wound after a shave biopsy?  Keep up with wound care for one week or until the area is healed     How should I care for my wound after a punch biopsy?  Complete wound care until the stitches are removed   Stitches need to be removed in 14 days. You may return to our clinic for this or you may have it done locally at your doctor s office.    When should I call my doctor?  If you have increased:   Pain or swelling    Pus or drainage  Temperature over 101? F (38.3 ? C)   Redness or warmth  around wound       Gentle Skin Care  Below is a list of products our providers recommend for gentle skin care.  Daily bathing is recommended. Make sure you are applying a good moisturizer after bathing every time.  Use Moisturizing creams at least twice daily to the whole body. Your provider may recommend a lighter or heavier moisturizer based on the severity and that time of year it is.  Lighter, more pleasing to the feel moisturizers include products such as; Cetaphil, Cerave, Aveeno and Vanicream light.   Thicker agents include; Aquaphor ointment, Vaseline, Eucerin and Vanicream.  Creams are more moisturizing than lotions  Products should be fragrance free- soaps, creams, detergents.  Mild Bar Soaps include;   Fragrance Free Dove, Basis and Purpose  Mild Liquid Cleansers;  Vanicream, Cetaphil,  Aquanil, Cerave and Aquaphor  Laundry Products include;  All Free and Clear, Dreft, and Cheer Free  Care Plan:  Keep bathing and showering short, less than 15 mins  Always use lukewarm warm when possible. AVOID very HOT or COLD water  DO NOT use bubble bath  Limit the use of soaps. Focus on  dirty  areas of the body; face, armpits, groin and feet  Do NOT vigorously scrub when you cleanse your skin  After bathing, PAT your skin lightly with a towel. DO NOT rub or scrub when drying  ALWAYS apply a moisturizer immediately after bathing. This helps to  lock in  the moisture. * IF YOU WERE PRESCRIBED A TOPICAL MEDICATION, APPLY YOUR MEDICATION FIRST THEN COVER WITH YOUR DAILY MOISTURIZER  Reapply moisturizing agents at least twice daily to your whole body  Do not use products such as powders, perfumes, or colognes on your skin  Avoid saunas and steam baths. This temperature is too HOT  Use unscented hypo-allergenic laundry products. AVOID fabric softeners  and dryer sheets  Avoid tight or  scratchy  clothing such as wool  Always wash new clothing before wearing them for the first time  Sometimes a humidifier or vaporizer, used at night can help the dry skin. Remember to keep it clean to avoid mold growth.

## 2024-06-27 NOTE — LETTER
6/27/2024       RE: Demetri Booth  [address]  [facility] [address]     Dear Colleague,    Thank you for referring your patient, Demetri Booth, to the [facility] DERMATOLOGY CLINIC [place] at [facility]. Please see a copy of my visit note below.    [facility] Dermatology Note  Encounter Date: Jun 27, 2024  Office Visit     Dermatology Problem List:  # Spot check 06/27/24   - NUB, central forehead, rule out SK vs MIS, s/p shave 06/27/24   - Inflamed SK, right post auricular - s/p LN2 06/27/24   - Inflamed skin tag, right axilla x 2 - s/p LN2 06/27/24   - Pink papule with central indentation and peripheral collarette, no concerning features on dermoscopy   - ddx: porokeratosis vs AK vs arising seborrheic keratosis   ____________________________________________    Assessment & Plan:     # Spot check 06/27/24   - NUB, central forehead, rule out SK vs MIS, s/p shave 06/27/24 as below     - Inflamed skin tag, right post auricular - s/p LN2 06/27/24 as below     - Inflamed skin tag, right axilla x 2 - s/p LN2 06/27/24 as below     - [place] papule with central indentation and peripheral collarette, no concerning features on dermoscopy   - ddx: porokeratosis vs AK vs arising seborrheic keratosis     The nature of sun-induced photo-aging and skin cancers is discussed.  Sun avoidance, protective clothing, and the use of 30-SPF sunscreens is advised. Observe closely for skin damage/changes, and call if such occurs.   - reassured patient regarding the benign nature of these lesions.  - reviewed sun protection measures (i.e. protective clothing and broad spectrum sunscreen).   - ABCDEs of melaoma reviewed      Procedures Performed:   Procedure Note: Biopsy by shave technique  The risks and benefits of the procedure were described to the patient. These include but are not limited to bleeding, infection, scar, incomplete removal, and  "non-diagnostic biopsy. Verbal informed consent was obtained. The central forehead was cleansed with an alcohol pad and injected with 1% lidocaine with epinephrine buffered with sodium bicarbonate. Once anesthesia was obtained, a biopsy was performed with Gilette blade. The tissue was placed in a labeled container with formalin and sent to pathology. Hemostasis was achieved with aluminum chloride. Vaseline and a bandage were applied to the wound. The patient tolerated the procedure well and was given post biopsy care instructions.    Procedure note: cryotherapy x 4   Liquid nitrogen was applied for 10-12 seconds to the skin lesion and the expected blistering or scabbing reaction explained. Do not pick at the area. Patient reminded to expect hypopigmented scars from the procedure. Return if lesion fails to fully resolve.    Follow-up: prn for new or changing lesions    Staff and Resident:   Millicent Weaver and Sophie Bourgeois DO    I was present for key portions of the biopsy and the entire cryotherapy procedure. Mylene ELISE I, Mylene Weaver MD, saw this patient with the resident and agree with the resident s findings and plan of care as documented in the resident s note.    ____________________________________________    CC: Derm Problem (Demetri Is here today for a lump on the forehead )    HPI:  Mr. Demetri Booth is a(n) 69 year old male who presents today as a new patient for \"lump on forehead\"   Patient had trauma to the forehead about 10-15 years ago and reports a brown spot that has been present since that time   Possibly worsening in terms or pigment   Reports a pink rough patch on his forehead as well that has been present for a number of years   Has not been tender, crusting or oozing at all   Does reports an irritated spot behind his right ear and some irritated skin tags in the armpits     Patient is otherwise feeling well, without additional skin concerns.    Labs Reviewed:  N/A    Physical " Exam:  Vitals: There were no vitals taken for this visit.  SKIN: Waist-up skin, which includes the head/face, neck, both arms, chest, back, abdomen, digits and/or nails was examined.  - brown linear macule, superior aspect is raised and rough to touch, unclear if there is a true pigment network on dermoscopy however follicular ostia are respected   - waxy stuck on plaque to the right post auricular   - skin colored pedunculated papules to bilateral axilla  x 2   - Pink papule with central indentation and peripheral collarette, no concerning features on dermoscopy   - diffuse xerosis   - No other lesions of concern on areas examined.     Medications:  Current Outpatient Medications   Medication Sig Dispense Refill    Acetaminophen (TYLENOL PO) Take 500 mg by mouth every 4 hours as needed       apixaban ANTICOAGULANT (ELIQUIS) 5 MG tablet Take 1 tablet (5 mg) by mouth 2 times daily 180 tablet 2    atorvastatin (LIPITOR) 10 MG tablet TAKE 1 TABLET (10 MG) BY MOUTH DAILY. 90 tablet 0    bisacodyl (DULCOLAX) 5 MG EC tablet Two days prior to exam take two (2) tablets at 4pm. One day prior to exam take two (2) tablets at 4pm 4 tablet 0    blood glucose (NO BRAND SPECIFIED) test strip Use to test blood sugar 1 times daily or as directed. To accompany: Blood Glucose Monitor Brands: per insurance. 100 strip 6    blood glucose monitoring (NO BRAND SPECIFIED) meter device kit Use to test blood sugar 1 times daily or as directed. Preferred blood glucose meter OR supplies to accompany: Blood Glucose Monitor Brands: per insurance. 1 kit 0    clobetasol (TEMOVATE) 0.05 % external cream APPLY TOPICALLY TWICE DAILY AS NEEDED FOR UP TO TWO WEEKS, THEN INTERMITTENLY. 30 g 1    furosemide (LASIX) 40 MG tablet Take 1 tablet (40 mg) by mouth daily 90 tablet 3    glimepiride (AMARYL) 4 MG tablet Take 1 tablet (4 mg) by mouth every morning (before breakfast) 90 tablet 3    lisinopril (ZESTRIL) 5 MG tablet TAKE 1 TABLET BY MOUTH TWICE A DAY  180 tablet 0    metFORMIN (GLUCOPHAGE) 1000 MG tablet TAKE 1 TABLET BY MOUTH TWICE A DAY WITH MEALS 180 tablet 0    metoprolol tartrate (LOPRESSOR) 100 MG tablet Take 1.5 tabs in the am and 1 tab in the evening 225 tablet 3    Misc Natural Products (LUTEIN VISION BLEND PO)       multivitamin, therapeutic with minerals (THERA-VIT-M) TABS Take 1 tablet by mouth daily.      order for DME Equipment being ordered: CPAP 1 Device 0    order for DME Equipment being ordered: JOBST compression stockings knee high 20-30 mm compression 2 Device 1    polyethylene glycol (GOLYTELY) 236 g suspension Two days before procedure at 5PM fill first container with water. Mix and drink an 8 oz glass every 10-15 minutes until HALF of the container is gone. Place the remainder in the refrigerator. One day before procedure at 5PM drink second half of bowel prep. Drink an 8 oz glass every 10-15 minutes until it is gone. Day of procedure 6 hours before arrival time fill the 2nd container with water. Mix and drink an 8 oz glass every 10-15 minutes until HALF of the container is gone. Discard the remaining solution. 8000 mL 0    sildenafil (VIAGRA) 100 MG tablet Use 1/2 to 1 pill as needed for sexual activity 30 tablet 11    thin (NO BRAND SPECIFIED) lancets Use with lanceting device. To accompany: Blood Glucose Monitor Brands: per insurance. 100 each 6    vitamin D3 (CHOLECALCIFEROL) 50 mcg (2000 units) tablet Take 1 tablet by mouth daily       No current facility-administered medications for this visit.      Past Medical History:   Patient Active Problem List   Diagnosis    Hypercholesterolemia    Hypertension goal BP (blood pressure) < 130/80    Permanent atrial fibrillation (H)    Essential hypertension, benign    Onychomycosis    Sleep apnea    Mixed hyperlipidemia    Cardiomyopathy (H)    ACP (advance care planning)    DM (diabetes mellitus) type II uncontrolled, periph vascular disorder    Morbid obesity (H)    Type 2 diabetes mellitus  with diabetic peripheral angiopathy without gangrene, without long-term current use of insulin (H)    Excessive drinking of alcohol     Past Medical History:   Diagnosis Date    Atrial fibrillation (H)     Cardiomyopathy (H)     Chest pain     Hyperlipidaemia     Hypertension     Migraine     benign migraine    Onychomycosis     SHAHIDA on CPAP        CC Referred Self,

## 2024-06-27 NOTE — PROGRESS NOTES
[facility] Dermatology Note  Encounter Date: Jun 27, 2024  Office Visit     Dermatology Problem List:  # Spot check 06/27/24   - NUB, central forehead, rule out SK vs MIS, s/p shave 06/27/24   - Inflamed SK, right post auricular - s/p LN2 06/27/24   - Inflamed skin tag, right axilla x 2 - s/p LN2 06/27/24   - Pink papule with central indentation and peripheral collarette, no concerning features on dermoscopy   - ddx: porokeratosis vs AK vs arising seborrheic keratosis   ____________________________________________    Assessment & Plan:     # Spot check 06/27/24   - NUB, central forehead, rule out SK vs MIS, s/p shave 06/27/24 as below     - Inflamed skin tag, right post auricular - s/p LN2 06/27/24 as below     - Inflamed skin tag, right axilla x 2 - s/p LN2 06/27/24 as below     - [place] papule with central indentation and peripheral collarette, no concerning features on dermoscopy   - ddx: porokeratosis vs AK vs arising seborrheic keratosis     The nature of sun-induced photo-aging and skin cancers is discussed.  Sun avoidance, protective clothing, and the use of 30-SPF sunscreens is advised. Observe closely for skin damage/changes, and call if such occurs.   - reassured patient regarding the benign nature of these lesions.  - reviewed sun protection measures (i.e. protective clothing and broad spectrum sunscreen).   - Saniya hu melgibran reviewed      Procedures Performed:   Procedure Note: Biopsy by shave technique  The risks and benefits of the procedure were described to the patient. These include but are not limited to bleeding, infection, scar, incomplete removal, and non-diagnostic biopsy. Verbal informed consent was obtained. The central forehead was cleansed with an alcohol pad and injected with 1% lidocaine with epinephrine buffered with sodium bicarbonate. Once anesthesia was obtained, a biopsy was performed with Gilette blade. The tissue was placed in a labeled container with formalin  "and sent to pathology. Hemostasis was achieved with aluminum chloride. Vaseline and a bandage were applied to the wound. The patient tolerated the procedure well and was given post biopsy care instructions.    Procedure note: cryotherapy x 4   Liquid nitrogen was applied for 10-12 seconds to the skin lesion and the expected blistering or scabbing reaction explained. Do not pick at the area. Patient reminded to expect hypopigmented scars from the procedure. Return if lesion fails to fully resolve.    Follow-up: prn for new or changing lesions    Staff and Resident:   Millicent Weaver and Sophie Bourgeois DO    I was present for key portions of the biopsy and the entire cryotherapy procedure. Mylene ELISE I, Mylene Weaver MD, saw this patient with the resident and agree with the resident s findings and plan of care as documented in the resident s note.    ____________________________________________    CC: Derm Problem (Demetri Is here today for a lump on the forehead )    HPI:  Mr. Demetri Booth is a(n) 69 year old male who presents today as a new patient for \"lump on forehead\"   Patient had trauma to the forehead about 10-15 years ago and reports a brown spot that has been present since that time   Possibly worsening in terms or pigment   Reports a pink rough patch on his forehead as well that has been present for a number of years   Has not been tender, crusting or oozing at all   Does reports an irritated spot behind his right ear and some irritated skin tags in the armpits     Patient is otherwise feeling well, without additional skin concerns.    Labs Reviewed:  N/A    Physical Exam:  Vitals: There were no vitals taken for this visit.  SKIN: Waist-up skin, which includes the head/face, neck, both arms, chest, back, abdomen, digits and/or nails was examined.  - brown linear macule, superior aspect is raised and rough to touch, unclear if there is a true pigment network on dermoscopy however follicular " ostia are respected   - waxy stuck on plaque to the right post auricular   - skin colored pedunculated papules to bilateral axilla  x 2   - Pink papule with central indentation and peripheral collarette, no concerning features on dermoscopy   - diffuse xerosis   - No other lesions of concern on areas examined.     Medications:  Current Outpatient Medications   Medication Sig Dispense Refill    Acetaminophen (TYLENOL PO) Take 500 mg by mouth every 4 hours as needed       apixaban ANTICOAGULANT (ELIQUIS) 5 MG tablet Take 1 tablet (5 mg) by mouth 2 times daily 180 tablet 2    atorvastatin (LIPITOR) 10 MG tablet TAKE 1 TABLET (10 MG) BY MOUTH DAILY. 90 tablet 0    bisacodyl (DULCOLAX) 5 MG EC tablet Two days prior to exam take two (2) tablets at 4pm. One day prior to exam take two (2) tablets at 4pm 4 tablet 0    blood glucose (NO BRAND SPECIFIED) test strip Use to test blood sugar 1 times daily or as directed. To accompany: Blood Glucose Monitor Brands: per insurance. 100 strip 6    blood glucose monitoring (NO BRAND SPECIFIED) meter device kit Use to test blood sugar 1 times daily or as directed. Preferred blood glucose meter OR supplies to accompany: Blood Glucose Monitor Brands: per insurance. 1 kit 0    clobetasol (TEMOVATE) 0.05 % external cream APPLY TOPICALLY TWICE DAILY AS NEEDED FOR UP TO TWO WEEKS, THEN INTERMITTENLY. 30 g 1    furosemide (LASIX) 40 MG tablet Take 1 tablet (40 mg) by mouth daily 90 tablet 3    glimepiride (AMARYL) 4 MG tablet Take 1 tablet (4 mg) by mouth every morning (before breakfast) 90 tablet 3    lisinopril (ZESTRIL) 5 MG tablet TAKE 1 TABLET BY MOUTH TWICE A  tablet 0    metFORMIN (GLUCOPHAGE) 1000 MG tablet TAKE 1 TABLET BY MOUTH TWICE A DAY WITH MEALS 180 tablet 0    metoprolol tartrate (LOPRESSOR) 100 MG tablet Take 1.5 tabs in the am and 1 tab in the evening 225 tablet 3    Misc Natural Products (LUTEIN VISION BLEND PO)       multivitamin, therapeutic with minerals  (THERA-VIT-M) TABS Take 1 tablet by mouth daily.      order for DME Equipment being ordered: CPAP 1 Device 0    order for DME Equipment being ordered: JOBST compression stockings knee high 20-30 mm compression 2 Device 1    polyethylene glycol (GOLYTELY) 236 g suspension Two days before procedure at 5PM fill first container with water. Mix and drink an 8 oz glass every 10-15 minutes until HALF of the container is gone. Place the remainder in the refrigerator. One day before procedure at 5PM drink second half of bowel prep. Drink an 8 oz glass every 10-15 minutes until it is gone. Day of procedure 6 hours before arrival time fill the 2nd container with water. Mix and drink an 8 oz glass every 10-15 minutes until HALF of the container is gone. Discard the remaining solution. 8000 mL 0    sildenafil (VIAGRA) 100 MG tablet Use 1/2 to 1 pill as needed for sexual activity 30 tablet 11    thin (NO BRAND SPECIFIED) lancets Use with lanceting device. To accompany: Blood Glucose Monitor Brands: per insurance. 100 each 6    vitamin D3 (CHOLECALCIFEROL) 50 mcg (2000 units) tablet Take 1 tablet by mouth daily       No current facility-administered medications for this visit.      Past Medical History:   Patient Active Problem List   Diagnosis    Hypercholesterolemia    Hypertension goal BP (blood pressure) < 130/80    Permanent atrial fibrillation (H)    Essential hypertension, benign    Onychomycosis    Sleep apnea    Mixed hyperlipidemia    Cardiomyopathy (H)    ACP (advance care planning)    DM (diabetes mellitus) type II uncontrolled, periph vascular disorder    Morbid obesity (H)    Type 2 diabetes mellitus with diabetic peripheral angiopathy without gangrene, without long-term current use of insulin (H)    Excessive drinking of alcohol     Past Medical History:   Diagnosis Date    Atrial fibrillation (H)     Cardiomyopathy (H)     Chest pain     Hyperlipidaemia     Hypertension     Migraine     benign migraine     Onychomycosis     SHAHIDA on CPAP        CC Referred Self, MD  No address on file on close of this encounter.

## 2024-06-27 NOTE — NURSING NOTE
Dermatology Rooming Note    Demetri Booth's goals for this visit include:   Chief Complaint   Patient presents with    Derm Problem     Demetri Is here today for a lump on the forehead      [facility] - Dermatology

## 2024-06-30 LAB
PATH REPORT.COMMENTS IMP SPEC: NORMAL
PATH REPORT.COMMENTS IMP SPEC: NORMAL
PATH REPORT.FINAL DX SPEC: NORMAL
PATH REPORT.GROSS SPEC: NORMAL
PATH REPORT.MICROSCOPIC SPEC OTHER STN: NORMAL
PATH REPORT.RELEVANT HX SPEC: NORMAL

## 2024-07-01 RX ORDER — LIDOCAINE 40 MG/G
CREAM TOPICAL
Status: CANCELLED | OUTPATIENT
Start: 2024-07-01

## 2024-07-01 RX ORDER — ONDANSETRON 2 MG/ML
4 INJECTION INTRAMUSCULAR; INTRAVENOUS
Status: CANCELLED | OUTPATIENT
Start: 2024-07-01

## 2024-07-02 ENCOUNTER — HOSPITAL ENCOUNTER (OUTPATIENT)
Facility: CLINIC | Age: 69
Discharge: HOME | End: 2024-07-02
Attending: COLON & RECTAL SURGERY | Admitting: COLON & RECTAL SURGERY
Payer: COMMERCIAL

## 2024-07-02 VITALS
OXYGEN SATURATION: 99 % | HEIGHT: 70 IN | RESPIRATION RATE: 16 BRPM | HEART RATE: 73 BPM | WEIGHT: 315 LBS | SYSTOLIC BLOOD PRESSURE: 117 MMHG | DIASTOLIC BLOOD PRESSURE: 67 MMHG | BODY MASS INDEX: 45.1 KG/M2

## 2024-07-02 LAB — COLONOSCOPY: NORMAL

## 2024-07-02 PROCEDURE — G0121 COLON CA SCRN NOT HI RSK IND: HCPCS | Performed by: COLON & RECTAL SURGERY

## 2024-07-02 PROCEDURE — 99153 MOD SED SAME PHYS/QHP EA: CPT | Performed by: COLON & RECTAL SURGERY

## 2024-07-02 PROCEDURE — 250N000011 HC RX IP 250 OP 636: Performed by: COLON & RECTAL SURGERY

## 2024-07-02 PROCEDURE — 45378 DIAGNOSTIC COLONOSCOPY: CPT | Performed by: COLON & RECTAL SURGERY

## 2024-07-02 PROCEDURE — G0500 MOD SEDAT ENDO SERVICE >5YRS: HCPCS | Performed by: COLON & RECTAL SURGERY

## 2024-07-02 RX ORDER — FLUMAZENIL 0.1 MG/ML
0.2 INJECTION, SOLUTION INTRAVENOUS
Status: CANCELLED | OUTPATIENT
Start: 2024-07-02 | End: 2024-07-02

## 2024-07-02 RX ORDER — FENTANYL CITRATE 50 UG/ML
INJECTION, SOLUTION INTRAMUSCULAR; INTRAVENOUS PRN
Status: DISCONTINUED | OUTPATIENT
Start: 2024-07-02 | End: 2024-07-02 | Stop reason: HOSPADM

## 2024-07-02 RX ORDER — ONDANSETRON 2 MG/ML
4 INJECTION INTRAMUSCULAR; INTRAVENOUS EVERY 6 HOURS PRN
Status: CANCELLED | OUTPATIENT
Start: 2024-07-02

## 2024-07-02 RX ORDER — NALOXONE HYDROCHLORIDE 0.4 MG/ML
0.2 INJECTION, SOLUTION INTRAMUSCULAR; INTRAVENOUS; SUBCUTANEOUS
Status: CANCELLED | OUTPATIENT
Start: 2024-07-02

## 2024-07-02 RX ORDER — PROCHLORPERAZINE MALEATE 5 MG
5 TABLET ORAL EVERY 6 HOURS PRN
Status: CANCELLED | OUTPATIENT
Start: 2024-07-02

## 2024-07-02 RX ORDER — ONDANSETRON 4 MG/1
4 TABLET, ORALLY DISINTEGRATING ORAL EVERY 6 HOURS PRN
Status: CANCELLED | OUTPATIENT
Start: 2024-07-02

## 2024-07-02 RX ORDER — NALOXONE HYDROCHLORIDE 0.4 MG/ML
0.4 INJECTION, SOLUTION INTRAMUSCULAR; INTRAVENOUS; SUBCUTANEOUS
Status: CANCELLED | OUTPATIENT
Start: 2024-07-02

## 2024-07-02 ASSESSMENT — ACTIVITIES OF DAILY LIVING (ADL)
ADLS_ACUITY_SCORE: 33
ADLS_ACUITY_SCORE: 35
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
## 2024-07-18 ENCOUNTER — OFFICE VISIT (OUTPATIENT)
Dept: PODIATRY | Facility: CLINIC | Age: 69
End: 2024-07-18
Payer: COMMERCIAL

## 2024-07-18 VITALS — DIASTOLIC BLOOD PRESSURE: 66 MMHG | SYSTOLIC BLOOD PRESSURE: 116 MMHG | HEART RATE: 80 BPM | OXYGEN SATURATION: 100 %

## 2024-07-18 DIAGNOSIS — R23.4 ESCHAR: ICD-10-CM

## 2024-07-18 DIAGNOSIS — B35.1 ONYCHOMYCOSIS: ICD-10-CM

## 2024-07-18 DIAGNOSIS — E11.51 TYPE II DIABETES MELLITUS WITH PERIPHERAL ARTERY DISEASE (H): Primary | ICD-10-CM

## 2024-07-18 PROCEDURE — 99213 OFFICE O/P EST LOW 20 MIN: CPT | Mod: 25 | Performed by: PODIATRIST

## 2024-07-18 PROCEDURE — 11720 DEBRIDE NAIL 1-5: CPT | Mod: Q8 | Performed by: PODIATRIST

## 2024-07-18 NOTE — PATIENT INSTRUCTIONS
We wish you continued good healing. If you have any questions or concerns, please do not hesitate to contact us at  [phone]    [employer] (secure e-mail communication and access to your chart) to send a message or to make an appointment.    Please remember to call and schedule a follow up appointment if one was recommended at your earliest convenience.     PODIATRY CLINIC HOURS  TELEPHONE NUMBER    Dr. Horace HUBERPCLEM FACFAS        Clinics:  [facility]   [place]  Tuesday 1PM-6PM  [place]  Wednesday 745AM-330PM  [place]  Monday 2nd,4th  830AM-4PM  Thursday/Friday 745AM-230PM     [place] APPOINTMENTS  [phone]    [place] APPOINTMENTS  [phone]          If you need a medication refill, please contact us you may need lab work and/or a follow up visit prior to your refill (i.e. Antifungal medications).  If MRI needed please call Imaging at [phone]   HOW DO I GET MY KNEE SCOOTER? Knee scooters can be picked up at ANY Medical Supply stores with your knee scooter Prescription.  OR  Bring your signed prescription to an [facility] Medical Equipment showroom.   Set up an appointment for your custom Orthotics. Call any Orthotics locations call [phone]
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
## 2024-07-31 ENCOUNTER — TELEPHONE (OUTPATIENT)
Dept: INTERNAL MEDICINE | Facility: CLINIC | Age: 69
End: 2024-07-31
Payer: COMMERCIAL

## 2024-07-31 NOTE — TELEPHONE ENCOUNTER
"RN spoke with patient and his wife (on CTC). Patient reports that he has had green watery diarrhea for \"quite some time\" and it is getting worse. Sometimes has to have a bowel movement every hour. Has not been sleeping well due to his diarrhea.     Denies recent antibiotics, abdominal pain, fevers or blood in stool.     RN scheduled appointment per Dr. Martinez 8/1/24.                 "

## 2024-08-01 ENCOUNTER — OFFICE VISIT (OUTPATIENT)
Dept: INTERNAL MEDICINE | Facility: CLINIC | Age: 69
End: 2024-08-01
Payer: COMMERCIAL

## 2024-08-01 VITALS
TEMPERATURE: 97.9 F | DIASTOLIC BLOOD PRESSURE: 77 MMHG | RESPIRATION RATE: 18 BRPM | HEIGHT: 70 IN | SYSTOLIC BLOOD PRESSURE: 118 MMHG | HEART RATE: 70 BPM | OXYGEN SATURATION: 98 % | WEIGHT: 315 LBS | BODY MASS INDEX: 45.1 KG/M2

## 2024-08-01 DIAGNOSIS — R19.7 DIARRHEA, UNSPECIFIED TYPE: ICD-10-CM

## 2024-08-01 DIAGNOSIS — E11.51 TYPE 2 DIABETES MELLITUS WITH DIABETIC PERIPHERAL ANGIOPATHY WITHOUT GANGRENE, WITHOUT LONG-TERM CURRENT USE OF INSULIN (H): Primary | ICD-10-CM

## 2024-08-01 DIAGNOSIS — I10 ESSENTIAL HYPERTENSION, BENIGN: ICD-10-CM

## 2024-08-01 PROCEDURE — 99214 OFFICE O/P EST MOD 30 MIN: CPT | Performed by: INTERNAL MEDICINE

## 2024-08-01 PROCEDURE — G2211 COMPLEX E/M VISIT ADD ON: HCPCS | Performed by: INTERNAL MEDICINE

## 2024-08-01 ASSESSMENT — PAIN SCALES - GENERAL: PAINLEVEL: NO PAIN (0)

## 2024-08-01 NOTE — PROGRESS NOTES
"  Assessment & Plan     (E11.51) Type 2 diabetes mellitus with diabetic peripheral angiopathy without gangrene, without long-term current use of insulin (H)  (primary encounter diagnosis)  Comment: has been stable overall  Plan: will defer A1c until next visit. Still needs eye exam, will hopefully have this completed by the time of our next visit in Dec for his AWV    (I10) Essential hypertension, benign  Comment: controlled.  Plan: Will plan to continue on previous medication without changes     (R19.7) Diarrhea, unspecified type  Comment: intermittent, even nighttime sx. This seems to be somewhat chronic and I have a strong suspicion it is related to his etoh intake. Advised that he cut down or stop altogether on a number of occasions in the past.  Plan: Enteric Bacteria and Virus Panel by [facility]        Will get infectious work up going. Low suspicion for c-diff. Did have normal colonoscopy a month ago but cannot rule out microscopic colitis as random biopsies were not performed (they were not aware of some of his ongoing sx).  If testing is normal, then would recommend cutting out etoh. Also could consider decreasing or stopping metformin short term.     The longitudinal plan of care for the diagnosis(es)/condition(s) as documented were addressed during this visit. Due to the added complexity in care, I will continue to support Demetri in the subsequent management and with ongoing continuity of care.           BMI  Estimated body mass index is 47.1 kg/m  as calculated from the following:    Height as of this encounter: 1.765 m (5' 9.5\").    Weight as of this encounter: 146.8 kg (323 lb 9.6 oz).             Subjective   Demetri is a 69 year old, presenting for the following health issues:  office visit and Recheck Medication (Pt reports that he is here to discuss possible C-diff, green and yellow diarrhea.)      8/1/2024    11:36 AM   Additional Questions   Roomed by sabrina   Accompanied by wife         8/1/2024    " "11:36 AM   Patient Reported Additional Medications   Patient reports taking the following new medications none     History of Present Illness       Reason for visit:  Digetive issues of diarrhea and gas  Symptom onset:  More than a month  Symptoms include:  Loose bowel movements sometimes 6 times in a day  Symptom intensity:  Moderate  Symptom progression:  Worsening  Had these symptoms before:  Yes  Has tried/received treatment for these symptoms:  No  What makes it worse:  Not sure  What makes it better:  Light snack    He eats 2-3 servings of fruits and vegetables daily.He consumes 0 sweetened beverage(s) daily.He exercises with enough effort to increase his heart rate 9 or less minutes per day.  He exercises with enough effort to increase his heart rate 3 or less days per week.   He is taking medications regularly.                     Objective    /77 (BP Location: Left arm, Patient Position: Sitting, Cuff Size: Adult Regular)   Pulse 70   Temp 97.9  F (36.6  C) (Oral)   Resp 18   Ht 1.765 m (5' 9.5\")   Wt 146.8 kg (323 lb 9.6 oz)   SpO2 98%   BMI 47.10 kg/m    Body mass index is 47.1 kg/m .  Physical Exam               Signed Electronically by: Jeff Martinez MD    "

## 2024-08-06 ENCOUNTER — APPOINTMENT (OUTPATIENT)
Dept: LAB | Facility: CLINIC | Age: 69
End: 2024-08-06
Payer: COMMERCIAL

## 2024-08-20 LAB
ADV 40+41 DNA STL QL NAA+NON-PROBE: NEGATIVE
ASTRO TYP 1-8 RNA STL QL NAA+NON-PROBE: NEGATIVE
C CAYETANENSIS DNA STL QL NAA+NON-PROBE: NEGATIVE
CAMPYLOBACTER DNA SPEC NAA+PROBE: NEGATIVE
CRYPTOSP DNA STL QL NAA+NON-PROBE: NEGATIVE
E COLI O157 DNA STL QL NAA+NON-PROBE: NORMAL
E HISTOLYT DNA STL QL NAA+NON-PROBE: NEGATIVE
EAEC ASTA GENE ISLT QL NAA+PROBE: NEGATIVE
EC STX1+STX2 GENES STL QL NAA+NON-PROBE: NEGATIVE
EPEC EAE GENE STL QL NAA+NON-PROBE: NEGATIVE
ETEC LTA+ST1A+ST1B TOX ST NAA+NON-PROBE: NEGATIVE
G LAMBLIA DNA STL QL NAA+NON-PROBE: NEGATIVE
NOROVIRUS GI+II RNA STL QL NAA+NON-PROBE: NEGATIVE
P SHIGELLOIDES DNA STL QL NAA+NON-PROBE: NEGATIVE
RVA RNA STL QL NAA+NON-PROBE: NEGATIVE
SALMONELLA SP RPOD STL QL NAA+PROBE: NEGATIVE
SAPO I+II+IV+V RNA STL QL NAA+NON-PROBE: NEGATIVE
SHIGELLA SP+EIEC IPAH ST NAA+NON-PROBE: NEGATIVE
V CHOLERAE DNA SPEC QL NAA+PROBE: NEGATIVE
VIBRIO DNA SPEC NAA+PROBE: NEGATIVE
Y ENTEROCOL DNA STL QL NAA+PROBE: NEGATIVE

## 2024-08-20 PROCEDURE — 87507 IADNA-DNA/RNA PROBE TQ 12-25: CPT | Mod: GZ | Performed by: INTERNAL MEDICINE

## 2024-08-28 ENCOUNTER — MYC REFILL (OUTPATIENT)
Dept: INTERNAL MEDICINE | Facility: CLINIC | Age: 69
End: 2024-08-28
Payer: COMMERCIAL

## 2024-08-28 DIAGNOSIS — L40.9 PSORIASIS OF SCALP: ICD-10-CM

## 2024-08-28 RX ORDER — CLOBETASOL PROPIONATE 0.5 MG/G
CREAM TOPICAL
Qty: 30 G | Refills: 1 | Status: SHIPPED | OUTPATIENT
Start: 2024-08-28

## 2024-09-16 ENCOUNTER — MYC MEDICAL ADVICE (OUTPATIENT)
Dept: INTERNAL MEDICINE | Facility: CLINIC | Age: 69
End: 2024-09-16
Payer: COMMERCIAL

## 2024-09-16 DIAGNOSIS — E11.9 TYPE 2 DIABETES MELLITUS WITHOUT COMPLICATION, WITHOUT LONG-TERM CURRENT USE OF INSULIN (H): ICD-10-CM

## 2024-09-16 RX ORDER — GLIMEPIRIDE 4 MG/1
4 TABLET ORAL
Qty: 90 TABLET | Refills: 2 | Status: SHIPPED | OUTPATIENT
Start: 2024-09-16

## 2024-10-28 ENCOUNTER — MYC REFILL (OUTPATIENT)
Dept: INTERNAL MEDICINE | Facility: CLINIC | Age: 69
End: 2024-10-28
Payer: COMMERCIAL

## 2024-10-28 DIAGNOSIS — E11.51 TYPE 2 DIABETES MELLITUS WITH DIABETIC PERIPHERAL ANGIOPATHY WITHOUT GANGRENE, WITHOUT LONG-TERM CURRENT USE OF INSULIN (H): ICD-10-CM

## 2024-11-09 ENCOUNTER — HEALTH MAINTENANCE LETTER (OUTPATIENT)
Age: 69
End: 2024-11-09

## 2024-12-13 SDOH — HEALTH STABILITY: PHYSICAL HEALTH: ON AVERAGE, HOW MANY DAYS PER WEEK DO YOU ENGAGE IN MODERATE TO STRENUOUS EXERCISE (LIKE A BRISK WALK)?: 7 DAYS

## 2024-12-13 SDOH — HEALTH STABILITY: PHYSICAL HEALTH: ON AVERAGE, HOW MANY MINUTES DO YOU ENGAGE IN EXERCISE AT THIS LEVEL?: 30 MIN

## 2024-12-13 ASSESSMENT — SOCIAL DETERMINANTS OF HEALTH (SDOH): HOW OFTEN DO YOU GET TOGETHER WITH FRIENDS OR RELATIVES?: ONCE A WEEK

## 2024-12-17 ENCOUNTER — TRANSFERRED RECORDS (OUTPATIENT)
Dept: MULTI SPECIALTY CLINIC | Facility: CLINIC | Age: 69
End: 2024-12-17
Payer: COMMERCIAL

## 2024-12-17 LAB — RETINOPATHY: NORMAL

## 2024-12-18 ENCOUNTER — OFFICE VISIT (OUTPATIENT)
Dept: INTERNAL MEDICINE | Facility: CLINIC | Age: 69
End: 2024-12-18
Attending: INTERNAL MEDICINE
Payer: COMMERCIAL

## 2024-12-18 VITALS
OXYGEN SATURATION: 98 % | BODY MASS INDEX: 43.67 KG/M2 | DIASTOLIC BLOOD PRESSURE: 80 MMHG | WEIGHT: 305 LBS | TEMPERATURE: 97.3 F | SYSTOLIC BLOOD PRESSURE: 126 MMHG | HEIGHT: 70 IN | HEART RATE: 78 BPM | RESPIRATION RATE: 18 BRPM

## 2024-12-18 DIAGNOSIS — I48.21 PERMANENT ATRIAL FIBRILLATION (H): ICD-10-CM

## 2024-12-18 DIAGNOSIS — E78.00 HYPERCHOLESTEROLEMIA: ICD-10-CM

## 2024-12-18 DIAGNOSIS — Z00.00 ENCOUNTER FOR MEDICARE ANNUAL WELLNESS EXAM: Primary | ICD-10-CM

## 2024-12-18 DIAGNOSIS — E11.51 TYPE 2 DIABETES MELLITUS WITH DIABETIC PERIPHERAL ANGIOPATHY WITHOUT GANGRENE, WITHOUT LONG-TERM CURRENT USE OF INSULIN (H): ICD-10-CM

## 2024-12-18 DIAGNOSIS — I10 ESSENTIAL HYPERTENSION, BENIGN: ICD-10-CM

## 2024-12-18 DIAGNOSIS — N50.89 SCROTAL EDEMA: ICD-10-CM

## 2024-12-18 DIAGNOSIS — L40.9 PSORIASIS OF SCALP: ICD-10-CM

## 2024-12-18 DIAGNOSIS — I42.9 CARDIOMYOPATHY, UNSPECIFIED TYPE (H): ICD-10-CM

## 2024-12-18 DIAGNOSIS — I10 HYPERTENSION GOAL BP (BLOOD PRESSURE) < 130/80: ICD-10-CM

## 2024-12-18 LAB
ALBUMIN SERPL BCG-MCNC: 3.7 G/DL (ref 3.5–5.2)
ALP SERPL-CCNC: 89 U/L (ref 40–150)
ALT SERPL W P-5'-P-CCNC: 20 U/L (ref 0–70)
ANION GAP SERPL CALCULATED.3IONS-SCNC: 10 MMOL/L (ref 7–15)
AST SERPL W P-5'-P-CCNC: 35 U/L (ref 0–45)
BILIRUB SERPL-MCNC: 1.1 MG/DL
BUN SERPL-MCNC: 15 MG/DL (ref 8–23)
CALCIUM SERPL-MCNC: 9.3 MG/DL (ref 8.8–10.4)
CHLORIDE SERPL-SCNC: 103 MMOL/L (ref 98–107)
CREAT SERPL-MCNC: 1.22 MG/DL (ref 0.67–1.17)
EGFRCR SERPLBLD CKD-EPI 2021: 64 ML/MIN/1.73M2
EST. AVERAGE GLUCOSE BLD GHB EST-MCNC: 108 MG/DL
GLUCOSE SERPL-MCNC: 80 MG/DL (ref 70–99)
HBA1C MFR BLD: 5.4 % (ref 0–5.6)
HCO3 SERPL-SCNC: 25 MMOL/L (ref 22–29)
POTASSIUM SERPL-SCNC: 4.4 MMOL/L (ref 3.4–5.3)
PROT SERPL-MCNC: 7.1 G/DL (ref 6.4–8.3)
SODIUM SERPL-SCNC: 138 MMOL/L (ref 135–145)

## 2024-12-18 PROCEDURE — G0439 PPPS, SUBSEQ VISIT: HCPCS | Performed by: INTERNAL MEDICINE

## 2024-12-18 PROCEDURE — 80053 COMPREHEN METABOLIC PANEL: CPT | Performed by: INTERNAL MEDICINE

## 2024-12-18 PROCEDURE — 83036 HEMOGLOBIN GLYCOSYLATED A1C: CPT | Performed by: INTERNAL MEDICINE

## 2024-12-18 PROCEDURE — 99214 OFFICE O/P EST MOD 30 MIN: CPT | Mod: 25 | Performed by: INTERNAL MEDICINE

## 2024-12-18 PROCEDURE — 36415 COLL VENOUS BLD VENIPUNCTURE: CPT | Performed by: INTERNAL MEDICINE

## 2024-12-18 RX ORDER — GLIMEPIRIDE 4 MG/1
4 TABLET ORAL
Qty: 90 TABLET | Refills: 1 | Status: SHIPPED | OUTPATIENT
Start: 2024-12-18

## 2024-12-18 RX ORDER — POLYETHYLENE GLYCOL 3350 17 G/17G
17 POWDER, FOR SOLUTION ORAL
COMMUNITY
Start: 2024-09-04

## 2024-12-18 RX ORDER — FUROSEMIDE 40 MG/1
40 TABLET ORAL DAILY
Qty: 90 TABLET | Refills: 3 | Status: CANCELLED | OUTPATIENT
Start: 2024-12-18

## 2024-12-18 RX ORDER — CLOBETASOL PROPIONATE 0.5 MG/G
CREAM TOPICAL
Qty: 30 G | Refills: 1 | Status: SHIPPED | OUTPATIENT
Start: 2024-12-18

## 2024-12-18 RX ORDER — ATORVASTATIN CALCIUM 10 MG/1
10 TABLET, FILM COATED ORAL DAILY
Qty: 90 TABLET | Refills: 3 | Status: SHIPPED | OUTPATIENT
Start: 2024-12-18

## 2024-12-18 RX ORDER — FUROSEMIDE 20 MG/1
20 TABLET ORAL DAILY PRN
Qty: 90 TABLET | Refills: 1 | Status: SHIPPED | OUTPATIENT
Start: 2024-12-18

## 2024-12-18 RX ORDER — LISINOPRIL 5 MG/1
5 TABLET ORAL 2 TIMES DAILY
Qty: 180 TABLET | Refills: 0 | Status: SHIPPED | OUTPATIENT
Start: 2024-12-18

## 2024-12-18 RX ORDER — CHLORAL HYDRATE 500 MG
1000 CAPSULE ORAL
COMMUNITY

## 2024-12-18 RX ORDER — HYDROXYZINE HYDROCHLORIDE 25 MG/1
1 TABLET, FILM COATED ORAL EVERY 4 HOURS PRN
COMMUNITY
Start: 2024-09-03

## 2024-12-18 RX ORDER — FOLIC ACID 1 MG/1
1 TABLET ORAL DAILY
COMMUNITY
Start: 2024-09-03

## 2024-12-18 ASSESSMENT — ANXIETY QUESTIONNAIRES
5. BEING SO RESTLESS THAT IT IS HARD TO SIT STILL: NOT AT ALL
GAD7 TOTAL SCORE: 1
2. NOT BEING ABLE TO STOP OR CONTROL WORRYING: NOT AT ALL
GAD7 TOTAL SCORE: 1
1. FEELING NERVOUS, ANXIOUS, OR ON EDGE: NOT AT ALL
7. FEELING AFRAID AS IF SOMETHING AWFUL MIGHT HAPPEN: NOT AT ALL
6. BECOMING EASILY ANNOYED OR IRRITABLE: SEVERAL DAYS
3. WORRYING TOO MUCH ABOUT DIFFERENT THINGS: NOT AT ALL
IF YOU CHECKED OFF ANY PROBLEMS ON THIS QUESTIONNAIRE, HOW DIFFICULT HAVE THESE PROBLEMS MADE IT FOR YOU TO DO YOUR WORK, TAKE CARE OF THINGS AT HOME, OR GET ALONG WITH OTHER PEOPLE: NOT DIFFICULT AT ALL

## 2024-12-18 ASSESSMENT — PATIENT HEALTH QUESTIONNAIRE - PHQ9: 5. POOR APPETITE OR OVEREATING: NOT AT ALL

## 2024-12-18 ASSESSMENT — PAIN SCALES - GENERAL: PAINLEVEL_OUTOF10: MILD PAIN (2)

## 2024-12-18 NOTE — PROGRESS NOTES
Preventive Care Visit  [facility]  Jeff Martinez MD, Internal Medicine  Dec 18, 2024      Assessment & Plan     (Z00.00) Encounter for Medicare annual wellness exam  (primary encounter diagnosis)  Comment: stable overall though continues to intermittently hold medications without checking in with us. May have played a part in his CVA in Aug (off of Eliquis).  Plan: Age appropriate cognitive and physical abilities.  Independent--uses cane.    (E11.51) Type 2 diabetes mellitus with diabetic peripheral angiopathy without gangrene, without long-term current use of insulin (H)  Comment: has been well controlled. Lost about 30 lbs over the last year, alcohol abuse has played a significant part in this--has cut back significantly recently  Plan: HEMOGLOBIN A1C, Albumin Random Urine         Quantitative with Creat Ratio, glimepiride         (AMARYL) 4 MG tablet        Will plan to continue on previous medication without changes   Pertinent labs today  Eye exam done just recently.    (I48.21) Permanent atrial fibrillation (H)  Comment: rate controlled. Likely played part in CVA in 8/2024. Now back on Eliquis  Plan: apixaban ANTICOAGULANT (ELIQUIS) 5 MG tablet        Discussed importance of medication adherence. Overdue for follow up with cardiology, they will get that set up shortly    (E78.00) Hypercholesterolemia  Comment: on statin, intermittently has been off of it  Plan: atorvastatin (LIPITOR) 10 MG tablet,         Comprehensive metabolic panel        Will plan to continue on previous medication without changes     (I42.9) Cardiomyopathy, unspecified type (H)  Comment: prior mild decreased EF  Plan: furosemide (LASIX) 20 MG tablet        Due for follow up with cards though appears to have good control with his volume status (likely dietary and decreased etoh intake has helped). Only taking half dose of his lasix intermittently. Will adjust this in the medication list, new rx. Cards follow up  "planned.    (I10) Essential hypertension, benign  Comment: reasonable control today  Plan: Will plan to continue on previous medication without changes     See me again in 6 months.            BMI  Estimated body mass index is 44.31 kg/m  as calculated from the following:    Height as of this encounter: 1.767 m (5' 9.57\").    Weight as of this encounter: 138.3 kg (305 lb).       Counseling  Appropriate preventive services were addressed with this patient via screening, questionnaire, or discussion as appropriate for fall prevention, nutrition, physical activity, Tobacco-use cessation, social engagement, weight loss and cognition.  Checklist reviewing preventive services available has been given to the patient.  Reviewed patient's diet, addressing concerns and/or questions.   The patient reports drinking more than 3 alcoholic drinks per day and/or more than 7 drhnks per week. The patient was counseled and given information about possible harmful effects of excessive alcohol intake.Updated plan of care.  Patient reported difficulty with activities of daily living were addressed today.The patient was provided with written information regarding signs of hearing loss.           Yusef Mosquera is a 69 year old, presenting for the following:  Wellness Visit (  )        12/18/2024    11:03 AM   Additional Questions   Roomed by Nelda         Via the Health Maintenance questionnaire, the patient has reported the following services have been completed -Eye Exam: Dr Jama 2024-12-17, this information has been sent to the abstraction team.    HPI  Pt is here for a wellness visit  [place] [place] Likely [place]olic, off Eliquis (on his own) prior. Scan only showed carotid stenosis. Had full recovery. Restart meds.          Health Care Directive  Patient has a Health Care Directive on file  Advance care planning document is on file and is current.      12/13/2024   General Health   How would you rate your overall " physical health? (!) FAIR   Feel stress (tense, anxious, or unable to sleep) Only a little      (!) STRESS CONCERN      12/13/2024   Nutrition   Diet: Regular (no restrictions)            12/13/2024   Exercise   Days per week of moderate/strenous exercise 7 days   Average minutes spent exercising at this level 30 min            12/13/2024   Social Factors   Frequency of gathering with friends or relatives Once a week   Worry food won't last until get money to buy more No   Food not last or not have enough money for food? No   Do you have housing? (Housing is defined as stable permanent housing and does not include staying ouside in a car, in a tent, in an abandoned building, in an overnight shelter, or couch-surfing.) Yes   Are you worried about losing your housing? No   Lack of transportation? No   Unable to get utilities (heat,electricity)? No            12/18/2024   Fall Risk   Gait Speed Test (Document in seconds) 4.46   Gait Speed Test Interpretation Less than or equal to 5.00 seconds - PASS             12/13/2024   Activities of Daily Living- Home Safety   Needs help with the following daily activites Dressing   Safety concerns in the home None of the above            12/13/2024   Dental   Dentist two times every year? Yes            12/13/2024   Hearing Screening   Hearing concerns? (!) I NEED TO ASK PEOPLE TO SPEAK UP OR REPEAT THEMSELVES.    (!) IT'S HARD TO FOLLOW A CONVERSATION IN A NOISY RESTAURANT OR CROWDED ROOM.    (!) TROUBLE UNDERSTANDING SOFT OR WHISPERED SPEECH.       Multiple values from one day are sorted in reverse-chronological order         12/13/2024   Driving Risk Screening   Patient/family members have concerns about driving No            12/13/2024   General Alertness/Fatigue Screening   Have you been more tired than usual lately? No            12/13/2024   Urinary Incontinence Screening   Bothered by leaking urine in past 6 months No            12/13/2024   TB Screening   Were you born  outside of the [place]? No            Today's PHQ-2 Score:       2024    11:01 AM   PHQ-2 (  [employer])   Q1: Little interest or pleasure in doing things 0    Q2: Feeling down, depressed or hopeless 0    PHQ-2 Score 0    Q1: Little interest or pleasure in doing things Not at all   Q2: Feeling down, depressed or hopeless Not at all   PHQ-2 Score 0       Patient-reported           2024   Substance Use   Alcohol more than 3/day or more than 7/wk Yes   How often do you have a drink containing alcohol 2 to 3 times a week   How many alcohol drinks on typical day 3 or 4   How often do you have 5+ drinks at one occasion Less than monthly   Audit 2/3 Score 2   How often not able to stop drinking once started Less than monthly   How often failed to do what normally expected Never   How often needed first drink in am after a heavy drinking session Never   How often feeling of guilt or remorse after drinking Less than monthly   How often unable to remember what happened the night before Never   Have you or someone else been injured because of your drinking No   Has anyone been concerned or suggested you cut down on drinking Yes, during the last year   TOTAL SCORE - AUDIT 11   Do you have a current opioid prescription? No   How severe/bad is pain from 1 to 10? 2/10   Do you use any other substances recreationally? No        Social History     Tobacco Use    Smoking status: Former     Current packs/day: 0.00     Types: Cigarettes     Quit date: 1970     Years since quittin.3    Smokeless tobacco: Never   Vaping Use    Vaping status: Never Used   Substance Use Topics    Alcohol use: Yes     Comment: 2-3 per day    Drug use: No       ASCVD Risk   The 10-year ASCVD risk score (Dora BONILLA, et al., 2019) is: 29.5%    Values used to calculate the score:      Age: 69 years      Sex: Male      Is Non- : No      Diabetic: Yes      Tobacco smoker: No      Systolic Blood Pressure: 126 mmHg       Is BP treated: Yes      HDL Cholesterol: 50 mg/dL      Total Cholesterol: 131 mg/dL            Reviewed and updated as needed this visit by Provider                      Current providers sharing in care for this patient include:  Patient Care Team:  Jeff Martinez MD as PCP - General (Internal Medicine)  Yareli Johnson MD as Referring Physician (Family Practice)  Carolyn Burdick MD as MD (Urology)  Melanie Rosales, [facility] as Pharmacist (Pharmacist)  Hien George PA as Physician Assistant (Urology)  Bloch, Lauren Turner, [facility] as Pharmacist (Pharmacist)  Chelsea Brid MD as Assigned Heart and Vascular Provider  Jeff Martinez MD as Assigned PCP  Sophie Bourgeois DO as Hospitalist (HOSPITALIST)  Horace Jeffery DPM as Assigned Surgical Provider    The following health maintenance items are reviewed in Epic and correct as of today:  Health Maintenance   Topic Date Due    ANNUAL REVIEW OF HM ORDERS  Never done    DIABETIC FOOT EXAM  10/20/2022    EYE EXAM  10/01/2023    BMP  06/07/2024    A1C  10/11/2024    MICROALBUMIN  12/07/2024    MEDICARE ANNUAL WELLNESS VISIT  12/07/2024    LIPID  04/11/2025    FALL RISK ASSESSMENT  12/18/2025    ADVANCE CARE PLANNING  12/10/2028    COLORECTAL CANCER SCREENING  07/02/2034    DTAP/TDAP/TD IMMUNIZATION (3 - Td or Tdap) 11/12/2034    HEPATITIS C SCREENING  Completed    PHQ-2 (once per calendar year)  Completed    INFLUENZA VACCINE  Completed    Pneumococcal Vaccine: 65+ Years  Completed    ZOSTER IMMUNIZATION  Completed    RSV VACCINE  Completed    AORTIC ANEURYSM SCREENING (SYSTEM ASSIGNED)  Completed    COVID-19 Vaccine  Completed    HPV IMMUNIZATION  Aged Out    MENINGITIS IMMUNIZATION  Aged Out    RSV MONOCLONAL ANTIBODY  Aged Out    TSH W/FREE T4 REFLEX  Discontinued            Objective    Exam  /80 (BP Location: Left arm, Patient Position: Sitting, Cuff Size: Adult Large)   Pulse 78   Temp 97.3  F (36.3  C)  "(Tympanic)   Resp 18   Ht 1.767 m (5' 9.57\")   Wt 138.3 kg (305 lb)   SpO2 98%   BMI 44.31 kg/m     Estimated body mass index is 44.31 kg/m  as calculated from the following:    Height as of this encounter: 1.767 m (5' 9.57\").    Weight as of this encounter: 138.3 kg (305 lb).    Physical Exam           12/18/2024   Mini Cog   Clock Draw Score 2 Normal   3 Item Recall 3 objects recalled   Mini Cog Total Score 5                 Signed Electronically by: Jeff Martinez MD    "

## 2024-12-18 NOTE — PATIENT INSTRUCTIONS
Patient Education   Preventive Care Advice   This is general advice given by our system to help you stay healthy. However, your care team may have specific advice just for you. Please talk to your care team about your preventive care needs.  Nutrition  Eat 5 or more servings of fruits and vegetables each day.  Try wheat bread, brown rice and whole grain pasta (instead of white bread, rice, and pasta).  Get enough calcium and vitamin D. Check the label on foods and aim for 100% of the RDA (recommended daily allowance).  Lifestyle  Exercise at least 150 minutes each week  (30 minutes a day, 5 days a week).  Do muscle strengthening activities 2 days a week. These help control your weight and prevent disease.  No smoking.  Wear sunscreen to prevent skin cancer.  Have a dental exam and cleaning every 6 months.  Yearly exams  See your health care team every year to talk about:  Any changes in your health.  Any medicines your care team has prescribed.  Preventive care, family planning, and ways to prevent chronic diseases.  Shots (vaccines)   HPV shots (up to age 26), if you've never had them before.  Hepatitis B shots (up to age 59), if you've never had them before.  COVID-19 shot: Get this shot when it's due.  Flu shot: Get a flu shot every year.  Tetanus shot: Get a tetanus shot every 10 years.  Pneumococcal, hepatitis A, and RSV shots: Ask your care team if you need these based on your risk.  Shingles shot (for age 50 and up)  General health tests  Diabetes screening:  Starting at age 35, Get screened for diabetes at least every 3 years.  If you are younger than age 35, ask your care team if you should be screened for diabetes.  Cholesterol test: At age 39, start having a cholesterol test every 5 years, or more often if advised.  Bone density scan (DEXA): At age 50, ask your care team if you should have this scan for osteoporosis (brittle bones).  Hepatitis C: Get tested at least once in your life.  STIs (sexually  transmitted infections)  Before age 24: Ask your care team if you should be screened for STIs.  After age 24: Get screened for STIs if you're at risk. You are at risk for STIs (including HIV) if:  You are sexually active with more than one person.  You don't use condoms every time.  You or a partner was diagnosed with a sexually transmitted infection.  If you are at risk for HIV, ask about PrEP medicine to prevent HIV.  Get tested for HIV at least once in your life, whether you are at risk for HIV or not.  Cancer screening tests  Cervical cancer screening: If you have a cervix, begin getting regular cervical cancer screening tests starting at age 21.  Breast cancer scan (mammogram): If you've ever had breasts, begin having regular mammograms starting at age 40. This is a scan to check for breast cancer.  Colon cancer screening: It is important to start screening for colon cancer at age 45.  Have a colonoscopy test every 10 years (or more often if you're at risk) Or, ask your provider about stool tests like a FIT test every year or Cologuard test every 3 years.  To learn more about your testing options, visit:   .  For help making a decision, visit:   https://bit.ly/pl22180.  Prostate cancer screening test: If you have a prostate, ask your care team if a prostate cancer screening test (PSA) at age 55 is right for you.  Lung cancer screening: If you are a current or former smoker ages 50 to 80, ask your care team if ongoing lung cancer screenings are right for you.  For informational purposes only. Not to replace the advice of your health care provider. Copyright   2023 [facility]. All rights reserved. Clinically reviewed by the [facility] Transitions Program. [employer] [id number] - REV 01/24.  Learning About Activities of Daily Living  What are activities of daily living?     Activities of daily living (ADLs) are the basic self-care tasks you do every day. These include eating, bathing, dressing,  and moving around.  As you age, and if you have health problems, you may find that it's harder to do some of these tasks. If so, your doctor can suggest ideas that may help.  To measure what kind of help you may need, your doctor will ask how well you are able to do ADLs. Let your doctor know if there are any tasks that you are having trouble doing. This is an important first step to getting help. And when you have the help you need, you can stay as independent as possible.  How will a doctor assess your ADLs?  Asking about ADLs is part of a routine health checkup your doctor will likely do as you age. Your health check might be done in a doctor's office, in your home, or at a hospital. The goal is to find out if you are having any problems that could make it hard to care for yourself or that make it unsafe for you to be on your own.  To measure your ADLs, your doctor will ask how hard it is for you to do routine tasks. Your doctor may also want to know if you have changed the way you do a task because of a health problem. Your doctor may watch how you:  Walk back and forth.  Keep your balance while you stand or walk.  Move from sitting to standing or from a bed to a chair.  Button or unbutton a shirt or sweater.  Remove and put on your shoes.  It's common to feel a little worried or anxious if you find you can't do all the things you used to be able to do. Talking with your doctor about ADLs is a way to make sure you're as safe as possible and able to care for yourself as well as you can. You may want to bring a caregiver, friend, or family member to your checkup. They can help you talk to your doctor.  Follow-up care is a key part of your treatment and safety. Be sure to make and go to all appointments, and call your doctor if you are having problems. It's also a good idea to know your test results and keep a list of the medicines you take.  Current as of: October 24, 2023  Content Version: 14.3    2024 [facility]  [employer].   Care instructions adapted under license by your healthcare professional. If you have questions about a medical condition or this instruction, always ask your healthcare professional. [facility] [employer] disclaims any warranty or liability for your use of this information.    Preventing Falls: Care Instructions  Injuries and health problems such as trouble walking or poor eyesight can increase your risk of falling. So can some medicines. But there are things you can do to help prevent falls. You can exercise to get stronger. You can also arrange your home to make it safer.    Talk to your doctor about the medicines you take. Ask if any of them increase the risk of falls and whether they can be changed or stopped.   Try to exercise regularly. It can help improve your strength and balance. This can help lower your risk of falling.         Practice fall safety and prevention.   Wear low-heeled shoes that fit well and give your feet good support. Talk to your doctor if you have foot problems that make this hard.  Carry a cellphone or wear a medical alert device that you can use to call for help.  Use stepladders instead of chairs to reach high objects. Don't climb if you're at risk for falls. Ask for help, if needed.  Wear the correct eyeglasses, if you need them.        Make your home safer.   Remove rugs, cords, clutter, and furniture from walkways.  Keep your house well lit. Use night-lights in hallways and bathrooms.  Install and use sturdy handrails on stairways.  Wear nonskid footwear, even inside. Don't walk barefoot or in socks without shoes.        Be safe outside.   Use handrails, curb cuts, and ramps whenever possible.  Keep your hands free by using a shoulder bag or backpack.  Try to walk in well-lit areas. Watch out for uneven ground, changes in pavement, and debris.  Be careful in the winter. Walk on the grass or gravel when sidewalks are slippery. Use de-icer on steps and walkways.  "Add non-slip devices to shoes.    Put grab bars and nonskid mats in your shower or tub and near the toilet. Try to use a shower chair or bath bench when bathing.   Get into a tub or shower by putting in your weaker leg first. Get out with your strong side first. Have a phone or medical alert device in the bathroom with you.   Where can you learn more?  Go to https://www.[employer].Sensitive Object/patiented  Enter G117 in the search box to learn more about \"Preventing Falls: Care Instructions.\"  Current as of: July 31, 2024  Content Version: 14.3    2024 [employer].   Care instructions adapted under license by your healthcare professional. If you have questions about a medical condition or this instruction, always ask your healthcare professional. [employer] disclaims any warranty or liability for your use of this information.    Hearing Loss: Care Instructions  Overview     Hearing loss is a sudden or slow decrease in how well you hear. It can range from slight to profound. Permanent hearing loss can occur with aging. It also can happen when you are exposed long-term to loud noise. Examples include listening to loud music, riding motorcycles, or being around other loud machines.  Hearing loss can affect your work and home life. It can make you feel lonely or depressed. You may feel that you have lost your independence. But hearing aids and other devices can help you hear better and feel connected to others.  Follow-up care is a key part of your treatment and safety. Be sure to make and go to all appointments, and call your doctor if you are having problems. It's also a good idea to know your test results and keep a list of the medicines you take.  How can you care for yourself at home?  Avoid loud noises whenever possible. This helps keep your hearing from getting worse.  Always wear hearing protection around loud noises.  Wear a hearing aid as directed.  A professional can help you pick a hearing aid " "that will work best for you.  You can also get hearing aids over the counter for mild to moderate hearing loss.  Have hearing tests as your doctor suggests. They can show whether your hearing has changed. Your hearing aid may need to be adjusted.  Use other devices as needed. These may include:  Telephone amplifiers and hearing aids that can connect to a television, stereo, radio, or microphone.  Devices that use lights or vibrations. These alert you to the doorbell, a ringing telephone, or a baby monitor.  Television closed-captioning. This shows the words at the bottom of the screen. Most new TVs can do this.  TTY (text telephone). This lets you type messages back and forth on the telephone instead of talking or listening. These devices are also called TDD. When messages are typed on the keyboard, they are sent over the phone line to a receiving TTY. The message is shown on a monitor.  Use text messaging, social media, and email if it is hard for you to communicate by telephone.  Try to learn a listening technique called speechreading. It is not lipreading. You pay attention to people's gestures, expressions, posture, and tone of voice. These clues can help you understand what a person is saying. Face the person you are talking to, and have them face you. Make sure the lighting is good. You need to see the other person's face clearly.  Think about counseling if you need help to adjust to your hearing loss.  When should you call for help?  Watch closely for changes in your health, and be sure to contact your doctor if:    You think your hearing is getting worse.     You have new symptoms, such as dizziness or nausea.   Where can you learn more?  Go to https://www.health[employer]U [employer]P.net/patiented  Enter R798 in the search box to learn more about \"Hearing Loss: Care Instructions.\"  Current as of: September 27, 2023  Content Version: 14.3    2024 [employer].   Care instructions adapted under license by your " "healthcare professional. If you have questions about a medical condition or this instruction, always ask your healthcare professional. [employer] disclaims any warranty or liability for your use of this information.    9 Ways to Cut Back on Drinking  Maybe you've found yourself drinking more alcohol than you'd prefer. If you want to cut back, here are some ideas to try.    Think before you drink.  Do you really want a drink, or is it just a habit? If you're used to having a drink at a certain time, try doing something else then.     Look for substitutes.  Find some no-alcohol drinks that you enjoy, like flavored seltzer water, tea with honey, or tonic with a slice of lime. Or try alcohol-free beer or \"virgin\" cocktails (without the alcohol).     Drink more water.  Use water to quench your thirst. Drink a glass of water before you have any alcohol. Have another glass along with every drink or between drinks.     Shrink your drink.  For example, have a bottle of beer instead of a pint. Use a smaller glass for wine. Choose drinks with lower alcohol content (ABV%). Or use less liquor and more mixer in cocktails.     Slow down.  It's easy to drink quickly and without thinking about it. Pay attention, and make each drink last longer.     Do the math.  Total up how much you spend on alcohol each month. How much is that a year? If you cut back, what could you do with the money you save?     Take a break.  Choose a day or two each week when you won't drink at all. Notice how you feel on those days, physically and emotionally. How did you sleep? Do you feel better? Over time, add more break days.     Count calories.  Would you like to lose some weight? For some people that's a good motivator for cutting back. Figure out how many calories are in each drink. How many does that add up to in a day? In a week? In a month?     Practice saying no.  Be ready when someone offers you a drink. Try: \"Thanks, I've had enough.\" " "Or \"Thanks, but I'm cutting back.\" Or \"No, thanks. I feel better when I drink less.\"   Current as of: November 15, 2023  Content Version: 14.3    2024 [employer].   Care instructions adapted under license by your healthcare professional. If you have questions about a medical condition or this instruction, always ask your healthcare professional. [employer] disclaims any warranty or liability for your use of this information.     "

## 2025-01-16 ENCOUNTER — OFFICE VISIT (OUTPATIENT)
Dept: DERMATOLOGY | Facility: CLINIC | Age: 70
End: 2025-01-16
Payer: COMMERCIAL

## 2025-01-16 DIAGNOSIS — L57.0 ACTINIC KERATOSES: ICD-10-CM

## 2025-01-16 DIAGNOSIS — L89.229: Primary | ICD-10-CM

## 2025-01-16 ASSESSMENT — PAIN SCALES - GENERAL: PAINLEVEL_OUTOF10: NO PAIN (0)

## 2025-01-16 NOTE — PATIENT INSTRUCTIONS
"Sun Protection      Sunscreen   What does \"broad spectrum mean\"?  Broad spectrum sunscreens protect against both UVA and UVB radiation. UVC is filtered out by the ozone layer.     What does SPF mean?   SPF stands for  Sun Protection Factor  and represents the ability to screen only UVB (burning) rays. UVB rays are mostly blocked in all sunscreens, but only those that contain titanium dioxide, zinc oxide, mexoryl or Parsol 1789 (avobenzone) block the UVA spectrum. Even though a sunscreen is labeled  UVA/UVB Protection  that is not entirely accurate because products that only partially protect against UVA can claim to protect against both UVA and UVB.     What SPF should I chose?   Aim to get a sunscreen that is at least sun protection factor (SPF) 30. SPF 15 provides about 92-93% coverage, SPF 30 about 95-97% coverage, and SPF 45 about 98% coverage. That is to say, SPF 30 is not twice as good as SPF 15. The reason why we recommend SPF 30 is because we are usually only putting on half the necessary amount of sunscreen to achieve the advertised protection. That means that it is very possible that your SPF 30 sunscreen is only providing you with SPF 15 coverage based on how much you are applying. SPF 15 (92-93% coverage) is the absolute minimal that we recommend. Similarly, the benefit of sunscreens with SPF higher than 50 is that even if you put on less than the required amount, you are likely still getting good protection (ex: even if you apply only half the recommended amount of , it should still provide you with an SPF of 50).     How much should I apply?  If covering your whole body, you should be using 30 grams, or one ounce, which is how much is in one shot glass! That s a THICK layer! May times, you are only applying half the recommended amount, which means that you are only getting half the SPF (for example, you may be using SPF 30 but if you're only applying half the recommended amount, you're only " getting SPF 15 protection.      When do I need to wear sunscreen?  Every day, rain or shine! Even on a rainy day or a day when you are only indoors, you are still being exposed harmful UV radiation from the sun. We usually recommend physical/mineral sunscreens (active ingredient is titanium dioxide or zinc oxide) as these ingredients have been around for many years, there is no concern of them being absorbed into the bloodstream, and they are coral reef friendly! However, the best sunscreen is the one that you will use everyday.     What about my kids?  Sunscreen is not recommended for infants under the age of 6 months. Use clothing, shade and sun avoidance for small infants. For kids older than 6 months, we recommend that you should use only mineral/physical sunscreens that have zinc oxide as the active ingredient. Sun-protective clothing and hats are also important for people of all ages.     Sun protective clothing and Resources   Coolibar (www.coolibar.Streak)  ProtoShare (wwwAnthem Digital Media)  [employer]systems (Focus Media)  [employer]DigitalGroup (Therative)  Carve Designs (leemail) - affordable  Skinz (Newselaskinz.com)    Long sleeve - Aashish Cool DRI UPF 50 or Wainwright PFG UPF 50  Hoodie - Wainwright PFG UPF 50  Swimshirt/Rash Guard - Mini UPF 50 (on Amazon)  Neck - Outdoor Research Ubertubes (www.outdoorresearch.Streak)      Do I need tinted sunscreen?  There is more and more research showing that visible light can also lead to discoloration (such as melasma). Tinted sunscreens (which contain iron oxides) protect against visible light as an added bonus.     What brands do you recommend?  Physical/Mineral Sunscreens (in no particular order)  Elta MD UV Physical Broad-Spectrum SPF 41 Sunscreen (Tinted, $33)  Skin Ceuticals Physical Fusion UV Defense SPF 50 (Tinted, $34)  Unsun Mineral Tinted Face Sunscreen (Tinted, with 2 shade ranges, $29)  It Cosmetics CC+ Cream with SPF 50+ (Tinted, can also double as  foundation/coverage -- great range of shades, $40)  Biossance Squalane + Zinc Sheer Mineral Sunscreen SPF 30 PA +++ (goes on white then blends in, $30)  Cerave 100% Mineral Sunscreen SPF 50 Face (good for sensitive skin, $15)  La Roche Posay Anthelios Mineral Zinc Oxide Sunscreen SPF 50 ($35)  La Roche Posay Anthelios Mineral Tinted Sunscreen for Face SPF 50 ($35)  Think Sport Sunscreen (great for sports, though has more of a white cast, $20)  Think Baby Sunscreen (for kids, $21)  Color Science Sunforgettable Total Protection Brush On Shield SPF 50 (Multiple tints, $130)    Chemical Sunscreens  Mica Rouapau Weightless Protection SPF 30 ($48)  Maria Eugenia SPF Brightening Moisturizer ($30)  Urban Skin Complexion Protection Moisturizer SPF 30 ($20)  Total Defense + Repair Broad Spectrum SPF 34 ($68)  Clarins UV PLUS Anti-Pollution Sunscreen Multi-Protection Tint SPF 50 (Multiple tints, $45)  Neutrogena Healthy Skin Glow Sheers Tinted Moisturizer with SPF 20 (Multiple tints, $11)    Cryotherapy Instructions     For the areas treated with liquid nitrogen (cryotherapy or freezing) today, they are expected to get pink, puffy, and perhaps even blister. The area should then crust up and fall off and the goal is to have nice new skin underneath. There is nothing special that you need to do for these areas. You can wash them as you do normal skin.     Sometimes a blister develops; if a blister does develop do NOT pop it. However, if it breaks open on its own, be sure to wash it with soap and water daily and put plain vasaline or petroleum jelly and a bandaid on it until the skin is healed.     Please call the clinic if you have any questions or concerns.

## 2025-01-16 NOTE — PROGRESS NOTES
[facility] Dermatology Note  Encounter Date: Jan 16, 2025  Office Visit     Dermatology Problem List:  # Spot check 06/27/24   # Pressure injury, bilateral posterior thighs  - activity modifications as able, avoid pressure to the posterior thighs, adjust chair etc   # AK   - x3 s/p LN2 01/16/25, scalp, forehead, temples   - pigmented AK, central forehead, s/p shave 06/27/24   # SK / inflamed skin tag   - Inflamed SK, right post auricular - s/p LN2 06/27/24   - Inflamed skin tag, right axilla x 2 - s/p LN2 06/27/24     ____________________________________________    Assessment & Plan:     # Pressure injury, bilateral posterior thighs  - activity modifications as able, avoid pressure to the posterior thighs, adjust chair etc     # AK   - x3 s/p LN2 01/16/25, scalp, forehead, temples     Procedures Performed:   Procedure note: cryotherapy x 3   Liquid nitrogen was applied for 10-12 seconds to the skin lesion and the expected blistering or scabbing reaction explained. Do not pick at the area. Patient reminded to expect hypopigmented scars from the procedure. Return if lesion fails to fully resolve.    Follow-up: 6 months for recheck, sooner with any new or changing lesions    Staff and Resident:   Dr. Lyn and Sophie Bourgeois, DO      I have personally examined this patient and agree with the resident doctor's documentation and plan of care. I have reviewed and amended the resident's note above. The documentation accurately reflects my clinical observations, diagnoses, treatment and follow-up plans. I was present for key portions of the procedure.       Lexie Lyn MD  Dermatology Staff    ____________________________________________    CC: Derm Problem (Has concern regarding psoriasis. He seems to notice that the clobetasol helps when he is using it. )    HPI:  Mr. Demetri Booth is a(n) 69 year old male who presents today as a return patient for follow up     Patient is a couple rough spots  on his forehead, scalp and temples  None of these spots have been tender, crusting or oozing at all     Also reports some discomfort behind posterior thighs in the area where his chair hits his thighs, he does spend most of the day sitting at the computer but is worried about psoriasis    Patient is otherwise feeling well, without additional skin concerns.    Labs Reviewed:  N/A    Physical Exam:  Vitals: There were no vitals taken for this visit.  SKIN: Focused exam of the head, neck and posterior thighs was performed today  -There are erythematous macules with overyling adherent scale on the bilateral temples, scalp and forehead.  -On the posterior thighs bilaterally at the same level, there are pink plaques without any overlying scale or other surface change   - No other lesions of concern on areas examined.     Medications:  Current Outpatient Medications   Medication Sig Dispense Refill    Acetaminophen (TYLENOL PO) Take 500 mg by mouth every 4 hours as needed       apixaban ANTICOAGULANT (ELIQUIS) 5 MG tablet Take 1 tablet (5 mg) by mouth 2 times daily. 180 tablet 2    atorvastatin (LIPITOR) 10 MG tablet Take 1 tablet (10 mg) by mouth daily. 90 tablet 3    blood glucose (NO BRAND SPECIFIED) test strip Use to test blood sugar 1 times daily or as directed. To accompany: Blood Glucose Monitor Brands: per insurance. 100 strip 6    blood glucose monitoring (NO BRAND SPECIFIED) meter device kit Use to test blood sugar 1 times daily or as directed. Preferred blood glucose meter OR supplies to accompany: Blood Glucose Monitor Brands: per insurance. 1 kit 0    clobetasol propionate (TEMOVATE) 0.05 % external cream APPLY TOPICALLY TWICE DAILY AS NEEDED FOR UP TO TWO WEEKS, THEN INTERMITTENLY. 30 g 1    fish oil-omega-3 fatty acids 1000 MG capsule Take 1,000 mg by mouth.      folic acid (FOLVITE) 1 MG tablet Take 1 tablet by mouth daily.      furosemide (LASIX) 20 MG tablet Take 1 tablet (20 mg) by mouth daily as needed  (edema). 90 tablet 1    glimepiride (AMARYL) 4 MG tablet Take 1 tablet (4 mg) by mouth every morning (before breakfast). 90 tablet 1    hydrOXYzine HCl (ATARAX) 25 MG tablet Take 1 tablet by mouth every 4 hours as needed.      lisinopril (ZESTRIL) 5 MG tablet Take 1 tablet (5 mg) by mouth 2 times daily. 180 tablet 0    metFORMIN (GLUCOPHAGE) 1000 MG tablet TAKE 1 TABLET BY MOUTH TWICE A DAY WITH MEALS 180 tablet 0    metoprolol tartrate (LOPRESSOR) 100 MG tablet Take 1.5 tabs in the am and 1 tab in the evening 225 tablet 3    Misc Natural Products (LUTEIN VISION BLEND PO)       multivitamin, therapeutic with minerals (THERA-VIT-M) TABS Take 1 tablet by mouth daily.      order for DME Equipment being ordered: CPAP 1 Device 0    order for DME Equipment being ordered: JOBST compression stockings knee high 20-30 mm compression 2 Device 1    polyethylene glycol (PEG 3350) 17 g packet Take 17 g by mouth.      Sennosides 17.2 MG TABS Take 17.2 mg by mouth.      sildenafil (VIAGRA) 100 MG tablet Use 1/2 to 1 pill as needed for sexual activity 30 tablet 11    thin (NO BRAND SPECIFIED) lancets Use with lanceting device. To accompany: Blood Glucose Monitor Brands: per insurance. 100 each 6    vitamin D3 (CHOLECALCIFEROL) 50 mcg (2000 units) tablet Take 1 tablet by mouth daily       No current facility-administered medications for this visit.      Past Medical History:   Patient Active Problem List   Diagnosis    Hypercholesterolemia    Hypertension goal BP (blood pressure) < 130/80    Permanent atrial fibrillation (H)    Essential hypertension, benign    Onychomycosis    Sleep apnea    Mixed hyperlipidemia    Cardiomyopathy (H)    ACP (advance care planning)    DM (diabetes mellitus) type II uncontrolled, periph vascular disorder    Morbid obesity (H)    Type 2 diabetes mellitus with diabetic peripheral angiopathy without gangrene, without long-term current use of insulin (H)    Excessive drinking of alcohol     Past Medical  History:   Diagnosis Date    Atrial fibrillation (H)     Cardiomyopathy (H)     Chest pain     Diabetes (H)     History of blood transfusion     age 15    Hyperlipidaemia     Hypertension     Migraine     benign migraine    Onychomycosis     SHAHIDA on CPAP        CC Referred Self, MD  No address on file on close of this encounter.

## 2025-01-16 NOTE — NURSING NOTE
Dermatology Rooming Note    Demetri Booth's goals for this visit include:   Chief Complaint   Patient presents with    Derm Problem     Has concern regarding psoriasis. He seems to notice that the clobetasol helps when he is using it.      Adryan Merrill, EMT  Clinic Support  [facility]     [phone]    Employed by [facility] [employer]

## 2025-01-16 NOTE — LETTER
1/16/2025       RE: Demetri Booth  [address]  [place] [place] [facility] [address]     Dear Colleague,    Thank you for referring your patient, Demetri Booth, to the [facility]ERMATOLOG[facility] [place] [facility]. Please see a copy of my visit note below.    [facility] Dermatology Note  Encounter Date: Jan 16, 2025  Office Visit     Dermatology Problem List:  # Spot check 06/27/24   # Pressure injury, bilateral posterior thighs  - activity modifications as able, avoid pressure to the posterior thighs, adjust chair etc   # AK   - x3 s/p LN2 01/16/25, scalp, forehead, temples   - pigmented AK, central forehead, s/p shave 06/27/24   # SK / inflamed skin tag   - Inflamed SK, right post auricular - s/p LN2 06/27/24   - Inflamed skin tag, right axilla x 2 - s/p LN2 06/27/24     ____________________________________________    Assessment & Plan:     # Pressure injury, bilateral posterior thighs  - activity modifications as able, avoid pressure to the posterior thighs, adjust chair etc     # AK   - x3 s/p LN2 01/16/25, scalp, forehead, temples     Procedures Performed:   Procedure note: cryotherapy x 3   Liquid nitrogen was applied for 10-12 seconds to the skin lesion and the expected blistering or scabbing reaction explained. Do not pick at the area. Patient reminded to expect hypopigmented scars from the procedure. Return if lesion fails to fully resolve.    Follow-up: 6 months for recheck, sooner with any new or changing lesions    Staff and Resident:   Dr. Lyn and Sophie Bourgeois, DO      I have personally examined this patient and agree with the resident doctor's documentation and plan of care. I have reviewed and amended the resident's note above. The documentation accurately reflects my clinical observations, diagnoses, treatment and follow-up plans. I was present for key portions of the procedure.       Lexie Lyn,  MD  Dermatology Staff    ____________________________________________    CC: Derm Problem (Has concern regarding psoriasis. He seems to notice that the clobetasol helps when he is using it. )    HPI:  Mr. Demetri Booth is a(n) 69 year old male who presents today as a return patient for follow up     Patient is a couple rough spots on his forehead, scalp and temples  None of these spots have been tender, crusting or oozing at all     Also reports some discomfort behind posterior thighs in the area where his chair hits his thighs, he does spend most of the day sitting at the computer but is worried about psoriasis    Patient is otherwise feeling well, without additional skin concerns.    Labs Reviewed:  N/A    Physical Exam:  Vitals: There were no vitals taken for this visit.  SKIN: Focused exam of the head, neck and posterior thighs was performed today  -There are erythematous macules with overyling adherent scale on the bilateral temples, scalp and forehead.  -On the posterior thighs bilaterally at the same level, there are pink plaques without any overlying scale or other surface change   - No other lesions of concern on areas examined.     Medications:  Current Outpatient Medications   Medication Sig Dispense Refill     Acetaminophen (TYLENOL PO) Take 500 mg by mouth every 4 hours as needed        apixaban ANTICOAGULANT (ELIQUIS) 5 MG tablet Take 1 tablet (5 mg) by mouth 2 times daily. 180 tablet 2     atorvastatin (LIPITOR) 10 MG tablet Take 1 tablet (10 mg) by mouth daily. 90 tablet 3     blood glucose (NO BRAND SPECIFIED) test strip Use to test blood sugar 1 times daily or as directed. To accompany: Blood Glucose Monitor Brands: per insurance. 100 strip 6     blood glucose monitoring (NO BRAND SPECIFIED) meter device kit Use to test blood sugar 1 times daily or as directed. Preferred blood glucose meter OR supplies to accompany: Blood Glucose Monitor Brands: per insurance. 1 kit 0     clobetasol propionate  (TEMOVATE) 0.05 % external cream APPLY TOPICALLY TWICE DAILY AS NEEDED FOR UP TO TWO WEEKS, THEN INTERMITTENLY. 30 g 1     fish oil-omega-3 fatty acids 1000 MG capsule Take 1,000 mg by mouth.       folic acid (FOLVITE) 1 MG tablet Take 1 tablet by mouth daily.       furosemide (LASIX) 20 MG tablet Take 1 tablet (20 mg) by mouth daily as needed (edema). 90 tablet 1     glimepiride (AMARYL) 4 MG tablet Take 1 tablet (4 mg) by mouth every morning (before breakfast). 90 tablet 1     hydrOXYzine HCl (ATARAX) 25 MG tablet Take 1 tablet by mouth every 4 hours as needed.       lisinopril (ZESTRIL) 5 MG tablet Take 1 tablet (5 mg) by mouth 2 times daily. 180 tablet 0     metFORMIN (GLUCOPHAGE) 1000 MG tablet TAKE 1 TABLET BY MOUTH TWICE A DAY WITH MEALS 180 tablet 0     metoprolol tartrate (LOPRESSOR) 100 MG tablet Take 1.5 tabs in the am and 1 tab in the evening 225 tablet 3     Misc Natural Products (LUTEIN VISION BLEND PO)        multivitamin, therapeutic with minerals (THERA-VIT-M) TABS Take 1 tablet by mouth daily.       order for DME Equipment being ordered: CPAP 1 Device 0     order for DME Equipment being ordered: JOBST compression stockings knee high 20-30 mm compression 2 Device 1     polyethylene glycol (PEG 3350) 17 g packet Take 17 g by mouth.       Sennosides 17.2 MG TABS Take 17.2 mg by mouth.       sildenafil (VIAGRA) 100 MG tablet Use 1/2 to 1 pill as needed for sexual activity 30 tablet 11     thin (NO BRAND SPECIFIED) lancets Use with lanceting device. To accompany: Blood Glucose Monitor Brands: per insurance. 100 each 6     vitamin D3 (CHOLECALCIFEROL) 50 mcg (2000 units) tablet Take 1 tablet by mouth daily       No current facility-administered medications for this visit.      Past Medical History:   Patient Active Problem List   Diagnosis     Hypercholesterolemia     Hypertension goal BP (blood pressure) < 130/80     Permanent atrial fibrillation (H)     Essential hypertension, benign     Onychomycosis      Sleep apnea     Mixed hyperlipidemia     Cardiomyopathy (H)     ACP (advance care planning)     DM (diabetes mellitus) type II uncontrolled, periph vascular disorder     Morbid obesity (H)     Type 2 diabetes mellitus with diabetic peripheral angiopathy without gangrene, without long-term current use of insulin (H)     Excessive drinking of alcohol     Past Medical History:   Diagnosis Date     Atrial fibrillation (H)      Cardiomyopathy (H)      Chest pain      Diabetes (H)      History of blood transfusion     age 15     Hyperlipidaemia      Hypertension      Migraine     benign migraine     Onychomycosis      SHAHIDA on CPAP        CC Referred Self, MD  No address on file on close of this encounter.      Again, thank you for allowing me to participate in the care of your patient.      Sincerely,    Sophie Bourgeois,

## 2025-02-10 ENCOUNTER — TELEPHONE (OUTPATIENT)
Dept: DERMATOLOGY | Facility: CLINIC | Age: 70
End: 2025-02-10
Payer: COMMERCIAL

## 2025-02-10 NOTE — TELEPHONE ENCOUNTER
2/10 Left Voicemail (1st Attempt) and Sent Mychart (1st Attempt) for the patient to call back and schedule the following:    Appointment type: Return Dermatology  Provider: Bk  Return date: June or July 2025  Specialty phone number: [phone]  Additional appointment(s) needed: na  Additonal Notes: na

## 2025-03-17 DIAGNOSIS — I10 HYPERTENSION GOAL BP (BLOOD PRESSURE) < 130/80: ICD-10-CM

## 2025-03-17 RX ORDER — LISINOPRIL 5 MG/1
5 TABLET ORAL 2 TIMES DAILY
Qty: 180 TABLET | Refills: 1 | Status: SHIPPED | OUTPATIENT
Start: 2025-03-17

## 2025-03-18 ENCOUNTER — MYC MEDICAL ADVICE (OUTPATIENT)
Dept: INTERNAL MEDICINE | Facility: CLINIC | Age: 70
End: 2025-03-18
Payer: COMMERCIAL

## 2025-03-26 PROBLEM — D61.818 OTHER PANCYTOPENIA (H): Status: ACTIVE | Noted: 2025-03-26

## 2025-04-15 ENCOUNTER — OFFICE VISIT (OUTPATIENT)
Dept: URGENT CARE | Facility: URGENT CARE | Age: 70
End: 2025-04-15
Payer: COMMERCIAL

## 2025-04-15 VITALS
HEIGHT: 69 IN | WEIGHT: 299 LBS | OXYGEN SATURATION: 97 % | SYSTOLIC BLOOD PRESSURE: 146 MMHG | TEMPERATURE: 97.3 F | HEART RATE: 87 BPM | BODY MASS INDEX: 44.28 KG/M2 | DIASTOLIC BLOOD PRESSURE: 89 MMHG

## 2025-04-15 DIAGNOSIS — S51.812A LACERATION OF LEFT FOREARM, INITIAL ENCOUNTER: ICD-10-CM

## 2025-04-15 DIAGNOSIS — Z79.2 NEED FOR PROPHYLACTIC ANTIBIOTIC: Primary | ICD-10-CM

## 2025-04-15 DIAGNOSIS — E11.51 TYPE 2 DIABETES MELLITUS WITH DIABETIC PERIPHERAL ANGIOPATHY WITHOUT GANGRENE, WITHOUT LONG-TERM CURRENT USE OF INSULIN (H): ICD-10-CM

## 2025-04-15 DIAGNOSIS — S61.219A LACERATION OF FINGER OF LEFT HAND WITHOUT FOREIGN BODY WITHOUT DAMAGE TO NAIL, UNSPECIFIED FINGER, INITIAL ENCOUNTER: ICD-10-CM

## 2025-04-15 PROCEDURE — 3077F SYST BP >= 140 MM HG: CPT | Performed by: FAMILY MEDICINE

## 2025-04-15 PROCEDURE — 3079F DIAST BP 80-89 MM HG: CPT | Performed by: FAMILY MEDICINE

## 2025-04-15 PROCEDURE — 99213 OFFICE O/P EST LOW 20 MIN: CPT | Performed by: FAMILY MEDICINE

## 2025-04-15 RX ORDER — CEPHALEXIN 500 MG/1
500 CAPSULE ORAL 3 TIMES DAILY
Qty: 15 CAPSULE | Refills: 0 | Status: SHIPPED | OUTPATIENT
Start: 2025-04-15 | End: 2025-04-20

## 2025-04-15 NOTE — PROGRESS NOTES
Urgent Care Clinic Visit    Chief Complaint   Patient presents with    Fall     Around 11:30 pm last night fell and injured left arm and hand on wall     Urgent Care     EMS was called to help get him up and dress the wounds                4/15/2025     2:46 PM   Additional Questions   Roomed by cha doan   Accompanied by luis Couch

## 2025-04-15 NOTE — PROGRESS NOTES
Chief Complaint   Patient presents with    Fall     Around 11:30 pm last night fell and injured left arm and hand on wall     Urgent Care     EMS was called to help get him up and dress the wounds      SUBJECTIVE:     Demetri was seen today for fall and urgent care.    Diagnoses and all orders for this visit:    Need for prophylactic antibiotic  -     cephALEXin (KEFLEX) 500 MG capsule; Take 1 capsule (500 mg) by mouth 3 times daily for 5 days.    Laceration of left forearm, initial encounter    Laceration of finger of left hand without foreign body without damage to nail, unspecified finger, initial encounter    Type 2 diabetes mellitus with diabetic peripheral angiopathy without gangrene, without long-term current use of insulin (H)        PLAN:  PROCEDURE NOTE::    Wound cleaned with HIBICLENS  Wound soaked  Wound was dressed with telfa and also vaseline coated gauze   Then pressure wrap done with coban   After care instructions:  Keep wound clean and dry for the next 24-48 hours  Signs of infection discussed today    Discussed with patient that because it is more than 15 hours no suturing will be done because it will increase the risk of infection and risk of bleeding.  Pressure wrap was done patient will be put on an antibiotic because of the extent of injury and also because of the history of diabetes.  Advised to watch for any worsening signs of infection should do the first dressing change out of 48 hours following which every 24hr       Demetri Booth is a 70 year old [facility] with injury related to a fall which happened yesterday 11:30 PM paramedics were called he was advised to go to the ER patient did not go to the ER he has left forearm laceration  along with laceration of the palmar aspect of the left hand involving the base of the middle finger.This is a non-work related injury.  Mechanism of injury: fell.against rough carpet     Associated symptoms: Denies  "numbness, weakness, or loss of function  Last tetanus booster within 10 years: yes    EXAM:   The patient appears today in alert,no apparent distress distress  VITALS: BP (!) 146/89   Pulse 87   Temp 97.3  F (36.3  C) (Temporal)   Ht 1.753 m (5' 9\")   Wt 135.6 kg (299 lb)   SpO2 97%   BMI 44.15 kg/m    Laceration   Extensive laceration noted in the left forearm measuring 10 cm x 8 cm flap kind multiple lacerations.  With some bleeding.  Characteristics of the laceration: bleeding- moderate, flap type, jagged, and superficial  Left hand -more aspect there is a 0.5 cm lag noted at the junction of metacarpal phalangeal joint of the middle finger   tendon function intact: yes  Sensation to light touch intact: yes  Pulses intact: not applicable  Picture included in patient's chart: no    Assessment:     Need for prophylactic antibiotic  Laceration of left forearm, initial encounter  Laceration of finger of left hand without foreign body without damage to nail, unspecified finger, initial encounter    Jaja Gilman MD     "

## 2025-04-15 NOTE — PATIENT INSTRUCTIONS
Change dressing after 48 hrs then every 24 hrs   Complete course of antibiotic     Jaja Gilman MD

## 2025-04-29 ENCOUNTER — HOSPITAL ENCOUNTER (OUTPATIENT)
Dept: GENERAL RADIOLOGY | Facility: CLINIC | Age: 70
Discharge: HOME | End: 2025-04-29
Attending: FAMILY MEDICINE | Admitting: FAMILY MEDICINE
Payer: COMMERCIAL

## 2025-04-29 ENCOUNTER — OFFICE VISIT (OUTPATIENT)
Dept: FAMILY MEDICINE | Facility: CLINIC | Age: 70
End: 2025-04-29
Payer: COMMERCIAL

## 2025-04-29 VITALS
RESPIRATION RATE: 18 BRPM | HEART RATE: 81 BPM | TEMPERATURE: 98.1 F | OXYGEN SATURATION: 99 % | BODY MASS INDEX: 44.28 KG/M2 | SYSTOLIC BLOOD PRESSURE: 136 MMHG | HEIGHT: 69 IN | DIASTOLIC BLOOD PRESSURE: 94 MMHG | WEIGHT: 299 LBS

## 2025-04-29 DIAGNOSIS — M54.50 ACUTE BILATERAL LOW BACK PAIN WITHOUT SCIATICA: Primary | ICD-10-CM

## 2025-04-29 DIAGNOSIS — E11.51 TYPE 2 DIABETES MELLITUS WITH DIABETIC PERIPHERAL ANGIOPATHY WITHOUT GANGRENE, WITHOUT LONG-TERM CURRENT USE OF INSULIN (H): ICD-10-CM

## 2025-04-29 DIAGNOSIS — M54.50 ACUTE BILATERAL LOW BACK PAIN WITHOUT SCIATICA: ICD-10-CM

## 2025-04-29 DIAGNOSIS — F10.10 EXCESSIVE DRINKING OF ALCOHOL: ICD-10-CM

## 2025-04-29 PROCEDURE — 99214 OFFICE O/P EST MOD 30 MIN: CPT | Performed by: FAMILY MEDICINE

## 2025-04-29 PROCEDURE — 3075F SYST BP GE 130 - 139MM HG: CPT | Performed by: FAMILY MEDICINE

## 2025-04-29 PROCEDURE — 3080F DIAST BP >= 90 MM HG: CPT | Performed by: FAMILY MEDICINE

## 2025-04-29 PROCEDURE — 1125F AMNT PAIN NOTED PAIN PRSNT: CPT | Performed by: FAMILY MEDICINE

## 2025-04-29 PROCEDURE — 72100 X-RAY EXAM L-S SPINE 2/3 VWS: CPT

## 2025-04-29 RX ORDER — SENNOSIDES 8.6 MG
325 CAPSULE ORAL DAILY
COMMUNITY

## 2025-04-29 RX ORDER — CYCLOBENZAPRINE HCL 10 MG
10 TABLET ORAL 3 TIMES DAILY PRN
Qty: 30 TABLET | Refills: 0 | Status: SHIPPED | OUTPATIENT
Start: 2025-04-29 | End: 2025-05-29

## 2025-04-29 ASSESSMENT — PATIENT HEALTH QUESTIONNAIRE - PHQ9
SUM OF ALL RESPONSES TO PHQ QUESTIONS 1-9: 3
SUM OF ALL RESPONSES TO PHQ QUESTIONS 1-9: 3
10. IF YOU CHECKED OFF ANY PROBLEMS, HOW DIFFICULT HAVE THESE PROBLEMS MADE IT FOR YOU TO DO YOUR WORK, TAKE CARE OF THINGS AT HOME, OR GET ALONG WITH OTHER PEOPLE: SOMEWHAT DIFFICULT

## 2025-04-29 ASSESSMENT — ENCOUNTER SYMPTOMS: BACK PAIN: 1

## 2025-04-29 ASSESSMENT — PAIN SCALES - GENERAL: PAINLEVEL_OUTOF10: MILD PAIN (2)

## 2025-04-29 NOTE — PROGRESS NOTES
"  Assessment & Plan     Acute bilateral low back pain without sciatica  After fall when getting out of shower ( admits that he had been \" drinking too much\"  Hit low back    Back pain/ spasm started 2 days later  - XR Lumbar Spine 2/3 Views; Future  - Physical Therapy  Referral; Future  - cyclobenzaprine (FLEXERIL) 10 MG tablet; Take 1 tablet (10 mg) by mouth 3 times daily as needed for muscle spasms.  No bowel or bladder symptoms    For acute pain, rest, intermittent application of cold packs (later, may switch to heat, but do not sleep on heating pad), analgesics and muscle relaxants are recommended. Discussed longer term treatment plan of prn NSAID's and discussed a home back care exercise program with flexion exercise routine. Proper lifting with avoidance of heavy lifting discussed. Consider Physical Therapy if not improving. Call or return to clinic prn if these symptoms worsen or fail to improve as anticipated.   Type 2 diabetes mellitus with diabetic peripheral angiopathy without gangrene, without long-term current use of insulin (H)  Well controlled     Excessive drinking of alcohol  He declined CD referral          BMI  Estimated body mass index is 44.29 kg/m  as calculated from the following:    Height as of this encounter: 1.75 m (5' 8.9\").    Weight as of this encounter: 135.6 kg (299 lb).   Weight management plan: Discussed healthy diet and exercise guidelines      Follow-up       Yusef Mosquera is a 70 year old, presenting for the following health issues:  Back Pain      4/29/2025     1:10 PM   Additional Questions   Roomed by Ekta MACIAS   Accompanied by Wife - Iza     Back Pain     History of Present Illness       Back Pain:  He presents for follow up of back pain. Patient's back pain is a new problem.    Original cause of back pain: a fall  First noticed back pain: 1-4 weeks ago  Patient feels back pain: constantlyLocation of back pain:  Right lower back and left lower back  Description " of back pain: cramping, dull ache and sharp  Back pain spreads: nowhere    Since patient first noticed back pain, pain is: gradually worsening  Does back pain interfere with his job:  Not applicable  On a scale of 1-10 (10 being the worst), patient describes pain as:  8  What makes back pain worse: bending, coughing, sitting and standing   Acupuncture: not tried  Acetaminophen: helpful  Activity or exercise: not tried  Chiropractor:  Not tried  Cold: not tried  Heat: not tried  Massage: helpful  Muscle relaxants: not tried  NSAIDS: not helpful  Opioids: helpful  Physical Therapy: not tried  Rest: helpful  Steroid Injection: not tried  Stretching: not tried  Surgery: not tried  TENS unit: not tried  Topical pain relievers: helpful  Other healthcare providers patient is seeing for back pain: None   He is taking medications regularly.            Diabetes Follow-up      What concerns do you have today about your diabetes? None   Do you have any of these symptoms? (Select all that apply)  Numbness in feet      BP Readings from Last 2 Encounters:   04/29/25 (!) 136/94   04/15/25 (!) 146/89     Hemoglobin A1C (%)   Date Value   12/18/2024 5.4   04/11/2024 6.4 (H)   02/02/2021 9.1 (H)   09/17/2020 7.8 (H)     LDL Cholesterol Calculated (mg/dL)   Date Value   04/11/2024 53   02/01/2023 83   05/20/2020 69   02/26/2020 66             Hypertension Follow-up    Do you check your blood pressure regularly outside of the clinic? No   Are you following a low salt diet? Yes  Are your blood pressures ever more than 140 on the top number (systolic) OR more   than 90 on the bottom number (diastolic), for example 140/90? No    BP Readings from Last 2 Encounters:   04/29/25 (!) 136/94   04/15/25 (!) 146/89           Review of Systems  Constitutional, HEENT, cardiovascular, pulmonary, gi and gu systems are negative, except as otherwise noted.      Objective    BP (!) 136/94 (BP Location: Left arm, Patient Position: Sitting, Cuff Size:  "Adult Regular)   Pulse 81   Temp 98.1  F (36.7  C) (Temporal)   Resp 18   Ht 1.75 m (5' 8.9\")   Wt 135.6 kg (299 lb)   SpO2 99%   BMI 44.29 kg/m    Body mass index is 44.29 kg/m .  Physical Exam   GENERAL: alert, mild distress, and obese  ABDOMEN: soft, nontender, no hepatosplenomegaly, no masses and bowel sounds normal  MS: normal muscle tone  SKIN: no suspicious lesions or rashes  NEURO: Normal strength and tone, mentation intact and speech normal  BACK:antalgic gait noted. Lumbosacral spine area reveals no local tenderness or mass. Painful and reduced LS ROM noted. Straight leg raise is negative at  on both sides. DTR's, motor strength and sensation normal, including heel and toe gait.  Peripheral pulses are palpable. Lumbar spine X-Ray: ordered, but results not yet available.     PSYCH: mentation appears normal, affect normal/bright    Office Visit on 12/18/2024   Component Date Value Ref Range Status    Estimated Average Glucose 12/18/2024 108  <117 mg/dL Final    Hemoglobin A1C 12/18/2024 5.4  0.0 - 5.6 % Final    Normal <5.7%   Prediabetes 5.7-6.4%    Diabetes 6.5% or higher     Note: Adopted from ADA consensus guidelines.    Creatinine Urine mg/dL 12/20/2024 41.7  mg/dL Final    The reference ranges have not been established in urine creatinine. The results should be integrated into the clinical context for interpretation.    Albumin Urine mg/L 12/20/2024 <12.0  mg/L Final    The reference ranges have not been established in urine albumin. The results should be integrated into the clinical context for interpretation.    Albumin Urine mg/g Cr 12/20/2024    Final    Unable to calculate, urine albumin and/or urine creatinine is outside detectable limits.  Microalbuminuria is defined as an albumin:creatinine ratio of 17 to 299 for males and 25 to 299 for females. A ratio of albumin:creatinine of 300 or higher is indicative of overt proteinuria.  Due to biologic variability, positive results should be " confirmed by a second, first-morning random or 24-hour timed urine specimen. If there is discrepancy, a third specimen is recommended. When 2 out of 3 results are in the microalbuminuria range, this is evidence for incipient nephropathy and warrants increased efforts at glucose control, blood pressure control, and institution of therapy with an angiotensin-converting-enzyme (ACE) inhibitor (if the patient can tolerate it).      Sodium 12/18/2024 138  135 - 145 mmol/L Final    Potassium 12/18/2024 4.4  3.4 - 5.3 mmol/L Final    Carbon Dioxide (CO2) 12/18/2024 25  22 - 29 mmol/L Final    Anion Gap 12/18/2024 10  7 - 15 mmol/L Final    Urea Nitrogen 12/18/2024 15.0  8.0 - 23.0 mg/dL Final    Creatinine 12/18/2024 1.22 (H)  0.67 - 1.17 mg/dL Final    GFR Estimate 12/18/2024 64  >60 mL/min/1.73m2 Final    eGFR calculated using 2021 CKD-EPI equation.    Calcium 12/18/2024 9.3  8.8 - 10.4 mg/dL Final    Reference intervals for this test were updated on 7/16/2024 to reflect our healthy population more accurately. There may be differences in the flagging of prior results with similar values performed with this method. Those prior results can be interpreted in the context of the updated reference intervals.    Chloride 12/18/2024 103  98 - 107 mmol/L Final    Glucose 12/18/2024 80  70 - 99 mg/dL Final    Alkaline Phosphatase 12/18/2024 89  40 - 150 U/L Final    AST 12/18/2024 35  0 - 45 U/L Final    ALT 12/18/2024 20  0 - 70 U/L Final    Protein Total 12/18/2024 7.1  6.4 - 8.3 g/dL Final    Albumin 12/18/2024 3.7  3.5 - 5.2 g/dL Final    Bilirubin Total 12/18/2024 1.1  <=1.2 mg/dL Final           Signed Electronically by: Chato Willis MD

## 2025-04-30 ENCOUNTER — THERAPY VISIT (OUTPATIENT)
Dept: PHYSICAL THERAPY | Facility: CLINIC | Age: 70
End: 2025-04-30
Attending: FAMILY MEDICINE
Payer: COMMERCIAL

## 2025-04-30 DIAGNOSIS — M54.50 ACUTE BILATERAL LOW BACK PAIN WITHOUT SCIATICA: ICD-10-CM

## 2025-04-30 PROCEDURE — 97161 PT EVAL LOW COMPLEX 20 MIN: CPT | Mod: GP | Performed by: PHYSICAL THERAPIST

## 2025-04-30 PROCEDURE — 97110 THERAPEUTIC EXERCISES: CPT | Mod: GP | Performed by: PHYSICAL THERAPIST

## 2025-04-30 NOTE — PROGRESS NOTES
PHYSICAL THERAPY EVALUATION  Type of Visit: Evaluation    {Adult Abuse Screen NOT completed. Complete screenin}    Fall Risk Screen:{ NOT completed. Complete the screen!:741628}  Have you fallen 2 or more times in the past year?: Yes  Have you fallen and had an injury in the past year?: Yes      Subjective      {Disappearing TIP  Health History pop-up / flowsheet :709300}   Presenting condition or subjective complaint: lower back pain.  Injury occurred after falling backwards trying to prop his foot on the couch to dry his leg after taking a shower.  He landed on his back and hit his head denting the wall.   [facility]  [facility] of onset/injury :334127}   Relevant medical history: Diabetes; Heart problems; High blood pressure; Non-healing wounds; Numbness or tingling in perianal area; Overweight; Significant weakness; Sleep disorder like apnea   Dates & types of surgery: herniated navel    Regular exercises:  does laps in building including 2 sets of stairs - 19 lap with 2K step.     Prior diagnostic imaging/testing results: X-ray   xray results from 25: No fracture. Normal vertebral heights. Mild anterolisthesis at L4-5. Chronic anterior wedging height loss at T11-L1 vertebral bodies. Multilevel disc height loss, osteophyte formation and facet arthropathy. Calcified aortic atherosclerotic   plaques.   Prior therapy history for the same diagnosis, illness or injury: No      Prior Level of Function  Transfers: {[facility] prior level of function (Optional):077844}  Ambulation: {bart prior level of function (Optional):871807}  ADL: {bart prior level of function (Optional):174417}  IADL: {bart prior level of function (Optional):322668}    Living Environment  Social support: With a significant other or spouse   Type of home: Apartment/condo   Stairs to enter the home: Yes 8 Is there a railing: Yes     Ramp: No   Stairs inside the home: No       Help at home: Self Cares (home health  aide/personal care attendant, family, etc); Home management tasks (cooking, cleaning)  Equipment owned: Straight Cane; Walker; Walker with wheels; Grab bars     Employment:      Hobbies/Interests: reading [employer] games    Patient goals for therapy: move without pain    Pain assessment: See objective evaluation for additional pain details     Objective   LUMBAR SPINE EVALUATION  PAIN:   Pain Level at Rest: 8/10  Pain Level with Use: 8/10  Symptom Location: right and left sided low back pain  Pain Quality: Aching, Dull, and cramping  Symptoms are Exacerbated By: bending, coughing, sitting, standing  Symptoms are Relieved By: NSAIDs, rest, massage, topical pain relievers     POSTURE: {DALY PT Posture (Optional):296595}  GAIT:   Assistive Device(s): {DALY PT EQUIP (Optional):720404}  Gait Deviations: {DALY PT GT DEV (Optional):008351}    Balance/Proprioception: {daly pt bal/proprioception (Optional):542359}    ROM:     AROM: (Major, Moderate, Minimal or Nil loss)  Movement Loss Nemesio Mod Min Nil Pain   Flexion        Extension        Side Gliding L        Side Gliding R          (Degrees) Left AROM Left PROM  Right AROM Right PROM   Hip Flexion       Hip Extension       Hip Abduction       Hip Adduction       Hip Internal Rotation       Hip External Rotation          PELVIC/SI SCREEN: {daly pt pelvic screen (Optional):961603}    MYOTOMES:    Left Right   T12-L3 (Hip Flexion) 5 5   L2-4 (Quads)  5 5   L4 (Ankle DF) 5 5   L5 (Great Toe Ext) 5 5   S1 (Toe Raise) 5 {DALY PT Strength (Optional):808671}   DTR S: {DALY PT DTRs (Optional):375519}  CORD SIGNS: {DALY PT Cord Signs (Optional):800278}  DERMATOMES: {DALY PT Dermatomes (Optional):375914}  NEURAL TENSION: {DALY PT Neural Tension (Optional):583973}    FLEXIBILITY: {DALY PT Flexibility (Optional):882091}  LUMBAR/HIP Special Tests: {daly pt spec test hip (Optional):927013}   PELVIS/SI SPECIAL TESTS: {DALY PT PELVIS SI SPEC TESTS (Optional):594434}  FUNCTIONAL TESTS: {DALY PT Functional  Tests (Optional):822311}  PALPATION: {DALY PT Palpation Lumbar (Optional):151436}      Assessment & Plan   CLINICAL IMPRESSIONS  Medical Diagnosis: acute bilateral low back pain without sciatica  {Disappearing TIP  Medical Dx :984169}  Treatment Diagnosis: acute bilateral low back pain without sciatica {Disappearing TIP  Treatment Dx :695460}  Impression/Assessment: {daly pt assessment:304450}    Clinical Decision Making (Complexity):  Clinical Presentation: {Presentation:746545}  Clinical Presentation Rationale: based on medical and personal factors listed in PT evaluation  Clinical Decision Making (Complexity): {Complexity:643796}    PLAN OF CARE  Treatment Interventions:  {:844311}    Long Term Goals {Disappearing TIP  Goals :891383}    PT Goal 1  Goal Identifier: sitting  Goal Description: pt will be able to sit 1-2 hours without back pain  Rationale: to maximize safety and independence with self cares;to maximize safety and independence within the community;to maximize safety and independence with performance of ADLs and functional tasks  Target Date: 07/23/25    {Disapparing TIP  Frequency/Duration :741271}  Frequency of Treatment: 1 x per week  Duration of Treatment: 4 weeks tapering to 2 x per month for 2 months    Recommended Referrals to Other Professionals: {daly referrals suggested:852916}  Education Assessment: {Disapparing TIP  Patient Education :700745}  Learner/Method: No Barriers to Learning;Patient;Listening;Reading;Demonstration;Pictures/Video  Education Comments: Discussed pathoanatomy and plan of care.    Risks and benefits of evaluation/treatment have been explained.   Patient/Family/caregiver agrees with Plan of Care.     Evaluation Time:   {Disappearing TIP  Eval Minutes :980469}     {EVAL ONLY:273340}     Signing Clinician: Madai Mcguire, PT        [facility]                                                                                   OUTPATIENT  PHYSICAL THERAPY  {Disappearing TIP  Cert Quick Add :484741}    PLAN OF TREATMENT FOR OUTPATIENT REHABILITATION   Patient's Last Name, First Name, Demetri Ontiveros YOB: 1955   Provider's Name   [facility]   Medical Record No.  [id number]     Onset Date: 04/29/25  Start of Care Date:       Medical Diagnosis:  acute bilateral low back pain without sciatica      PT Treatment Diagnosis:  acute bilateral low back pain without sciatica Plan of Treatment  Frequency/Duration: 1 x per week/ 4 weeks tapering to 2 x per month for 2 months    Certification date from   to           See note for plan of treatment details and functional goals     Madai Mcguire, PT                       {Rehab Co-Sign/Paper:741463}    Referring Provider:  Chato Willis    Initial Assessment  See Epic Evaluation-

## 2025-05-05 ENCOUNTER — THERAPY VISIT (OUTPATIENT)
Dept: PHYSICAL THERAPY | Facility: CLINIC | Age: 70
End: 2025-05-05
Payer: COMMERCIAL

## 2025-05-05 ENCOUNTER — OFFICE VISIT (OUTPATIENT)
Dept: URGENT CARE | Facility: URGENT CARE | Age: 70
End: 2025-05-05
Payer: COMMERCIAL

## 2025-05-05 VITALS
DIASTOLIC BLOOD PRESSURE: 85 MMHG | SYSTOLIC BLOOD PRESSURE: 143 MMHG | RESPIRATION RATE: 20 BRPM | OXYGEN SATURATION: 97 % | HEART RATE: 82 BPM | WEIGHT: 299 LBS | BODY MASS INDEX: 44.29 KG/M2 | TEMPERATURE: 97 F

## 2025-05-05 DIAGNOSIS — M54.50 ACUTE BILATERAL LOW BACK PAIN WITHOUT SCIATICA: Primary | ICD-10-CM

## 2025-05-05 DIAGNOSIS — E86.0 MILD DEHYDRATION: Primary | ICD-10-CM

## 2025-05-05 DIAGNOSIS — R47.9 SPEECH DISTURBANCE, UNSPECIFIED TYPE: ICD-10-CM

## 2025-05-05 LAB
ANION GAP SERPL CALCULATED.3IONS-SCNC: 7 MMOL/L (ref 3–14)
BASOPHILS # BLD AUTO: 0 10E3/UL (ref 0–0.2)
BASOPHILS NFR BLD AUTO: 0 %
BUN SERPL-MCNC: 12 MG/DL (ref 7–30)
CALCIUM SERPL-MCNC: 9.4 MG/DL (ref 8.5–10.1)
CHLORIDE BLD-SCNC: 108 MMOL/L (ref 94–109)
CO2 SERPL-SCNC: 27 MMOL/L (ref 20–32)
CREAT SERPL-MCNC: 1.3 MG/DL (ref 0.66–1.25)
EGFRCR SERPLBLD CKD-EPI 2021: 59 ML/MIN/1.73M2
EOSINOPHIL # BLD AUTO: 0.1 10E3/UL (ref 0–0.7)
EOSINOPHIL NFR BLD AUTO: 4 %
ERYTHROCYTE [DISTWIDTH] IN BLOOD BY AUTOMATED COUNT: 13.8 % (ref 10–15)
GLUCOSE BLD-MCNC: 113 MG/DL (ref 70–99)
HCT VFR BLD AUTO: 39.9 % (ref 40–53)
HGB BLD-MCNC: 13.1 G/DL (ref 13.3–17.7)
IMM GRANULOCYTES # BLD: 0 10E3/UL
IMM GRANULOCYTES NFR BLD: 0 %
LYMPHOCYTES # BLD AUTO: 0.6 10E3/UL (ref 0.8–5.3)
LYMPHOCYTES NFR BLD AUTO: 15 %
MCH RBC QN AUTO: 31.7 PG (ref 26.5–33)
MCHC RBC AUTO-ENTMCNC: 32.8 G/DL (ref 31.5–36.5)
MCV RBC AUTO: 97 FL (ref 78–100)
MONOCYTES # BLD AUTO: 0.5 10E3/UL (ref 0–1.3)
MONOCYTES NFR BLD AUTO: 13 %
NEUTROPHILS # BLD AUTO: 2.6 10E3/UL (ref 1.6–8.3)
NEUTROPHILS NFR BLD AUTO: 68 %
PLATELET # BLD AUTO: 110 10E3/UL (ref 150–450)
POTASSIUM BLD-SCNC: 4.8 MMOL/L (ref 3.4–5.3)
RBC # BLD AUTO: 4.13 10E6/UL (ref 4.4–5.9)
SODIUM SERPL-SCNC: 142 MMOL/L (ref 135–145)
WBC # BLD AUTO: 3.8 10E3/UL (ref 4–11)

## 2025-05-05 PROCEDURE — 3079F DIAST BP 80-89 MM HG: CPT | Performed by: FAMILY MEDICINE

## 2025-05-05 PROCEDURE — 36415 COLL VENOUS BLD VENIPUNCTURE: CPT | Performed by: FAMILY MEDICINE

## 2025-05-05 PROCEDURE — 85025 COMPLETE CBC W/AUTO DIFF WBC: CPT | Performed by: FAMILY MEDICINE

## 2025-05-05 PROCEDURE — 99215 OFFICE O/P EST HI 40 MIN: CPT | Performed by: FAMILY MEDICINE

## 2025-05-05 PROCEDURE — 97110 THERAPEUTIC EXERCISES: CPT | Mod: GP

## 2025-05-05 PROCEDURE — 3077F SYST BP >= 140 MM HG: CPT | Performed by: FAMILY MEDICINE

## 2025-05-05 PROCEDURE — 80048 BASIC METABOLIC PNL TOTAL CA: CPT | Performed by: FAMILY MEDICINE

## 2025-05-05 NOTE — PROGRESS NOTES
Urgent Care Clinic Visit    Chief Complaint   Patient presents with    Urgent Care     - 2 Days  - Not Pronouncing Words  - Not Remembering Words  - Dry Mouth   - Hx of Afib (Metoprolol and Elliquis)   - On Flexeril for recent fall               5/5/2025     1:34 PM   Additional Questions   Roomed by Robert GONG   Accompanied by None

## 2025-05-05 NOTE — PROGRESS NOTES
Assessment & Plan     Mild dehydration  - Basic metabolic panel  (Ca, Cl, CO2, Creat, Gluc, K, Na, BUN)  - CBC with platelets and differential  - Basic metabolic panel  (Ca, Cl, CO2, Creat, Gluc, K, Na, BUN)  - CBC with platelets and differential    Speech disturbance, unspecified type     No apparent neuro deficits on my exam and given the timeframe of symptoms he is not a candidate for tPA or thrombectomy -  I did attempt to find him a spot at an [facility] site  for today but given it is late afternoon and all sites are fully booked we came to a shared agreement to schedule him for tomorrow morning in [place]. They understand if symptoms progress/worsen to go to the ED immediately.     Patient appears well at this time --  his blood work as drawn below is without any changes from most recent labs 4 months ago    Defer to the judgment of clinician on whether to do CT or MRI to evaluate for possible changes       50 minutes spent on the date of the encounter doing chart review, history and exam, documentation and further activities per the note.         Tyron Murrell MD   [facility]    Yusef Mosquera is a 70 year old male who presents to clinic today for the following health issues:  Chief Complaint   Patient presents with    Urgent Care     - 2 Days  - Not Pronouncing Words  - Not Remembering Words  - Dry Mouth   - Tingling in both hands  - Hx of Afib (Metoprolol and Elliquis)   - Hx of CVA last SEPT  - On Flexeril for recent fall      HPI    Patient history of stroke which occurred last fall this was believed to be due to noncompliance with Eliquis therapy at that present time as he has a history of atrial fibrillation per the recollection he was interpreted a clot may have formed in his carotid artery    Of note patient had a fall recently and was prescribed a muscle relaxer although has been using exclusively at nighttime --does not report being drowsy in the morning.     They have not yet reached  out to their primary care team regarding the symptoms as stated above --wife says he does not have the same level of slurred speech as seen with his previous stroke but at times he sounds a bit garbled.  There have been no choking episodes.  Patient also a bit more forgetful from usual per his wife who accompanies him today    No balance difficulties or loss of motor function.  No new facial droop or double vision or loss of vision.     Patient adherent to therapy    Patient Active Problem List    Diagnosis Date Noted    Other pancytopenia (H) 03/26/2025     Priority: Medium    Excessive drinking of alcohol 12/07/2023     Priority: Medium    Type 2 diabetes mellitus with diabetic peripheral angiopathy without gangrene, without long-term current use of insulin (H) 10/01/2022     Priority: Medium    Morbid obesity (H) 09/28/2022     Priority: Medium    ACP (advance care planning) 10/09/2015     Priority: Medium     Advance Care Planning 10/9/2015: ACP Review and Resources Provided:  Reviewed chart for advance care plan.  Demetri Booth has no plan or code status on file however states presence of ACP document. Copy requested. Confirmed code status reflects current choices pending receipt of document/advance care plan review. Confirmed/documented legally designated decision maker(s). Added by  Madison Johnson RN Advance Care Planning Liaison with [employer]          Sleep apnea      Priority: Medium    Mixed hyperlipidemia      Priority: Medium     Diagnosis updated by automated process. Provider to review and confirm.      Cardiomyopathy (H)      Priority: Medium    Permanent atrial fibrillation (H)      Priority: Medium    Essential hypertension, benign      Priority: Medium    Onychomycosis      Priority: Medium    Hypertension goal BP (blood pressure) < 130/80 12/23/2012     Priority: Medium       Current Outpatient Medications   Medication Sig Dispense Refill    Acetaminophen (TYLENOL PO) Take 500 mg by mouth  every 4 hours as needed       apixaban ANTICOAGULANT (ELIQUIS) 5 MG tablet Take 1 tablet (5 mg) by mouth 2 times daily. 180 tablet 2    aspirin (ASA) 325 MG EC tablet Take 325 mg by mouth daily.      atorvastatin (LIPITOR) 10 MG tablet Take 1 tablet (10 mg) by mouth daily. 90 tablet 3    blood glucose (NO BRAND SPECIFIED) test strip Use to test blood sugar 1 times daily or as directed. To accompany: Blood Glucose Monitor Brands: per insurance. 100 strip 6    blood glucose monitoring (NO BRAND SPECIFIED) meter device kit Use to test blood sugar 1 times daily or as directed. Preferred blood glucose meter OR supplies to accompany: Blood Glucose Monitor Brands: per insurance. 1 kit 0    clobetasol propionate (TEMOVATE) 0.05 % external cream APPLY TOPICALLY TWICE DAILY AS NEEDED FOR UP TO TWO WEEKS, THEN INTERMITTENLY. 30 g 1    cyclobenzaprine (FLEXERIL) 10 MG tablet Take 1 tablet (10 mg) by mouth 3 times daily as needed for muscle spasms. 30 tablet 0    escitalopram (LEXAPRO) 5 MG tablet Take 1 tablet (5 mg) by mouth daily. 90 tablet 1    fish oil-omega-3 fatty acids 1000 MG capsule Take 1,000 mg by mouth.      folic acid (FOLVITE) 1 MG tablet Take 1 tablet by mouth daily.      furosemide (LASIX) 20 MG tablet Take 1 tablet (20 mg) by mouth daily as needed (edema). 90 tablet 1    glimepiride (AMARYL) 4 MG tablet Take 1 tablet (4 mg) by mouth every morning (before breakfast). 90 tablet 1    hydrOXYzine HCl (ATARAX) 25 MG tablet Take 1 tablet by mouth every 4 hours as needed.      lisinopril (ZESTRIL) 5 MG tablet TAKE 1 TABLET BY MOUTH TWICE A  tablet 1    metFORMIN (GLUCOPHAGE XR) 500 MG 24 hr tablet Take 500 mg by mouth daily (with dinner). As needed      metoprolol tartrate (LOPRESSOR) 100 MG tablet Take 1.5 tabs in the am and 1 tab in the evening (Patient taking differently: Take 100 mg by mouth 2 times daily. 1 AM and 1 PM) 225 tablet 3    Misc Natural Products (LUTEIN VISION BLEND PO)       multivitamin,  therapeutic with minerals (THERA-VIT-M) TABS Take 1 tablet by mouth daily.      order for DME Equipment being ordered: CPAP 1 Device 0    order for DME Equipment being ordered: JOBST compression stockings knee high 20-30 mm compression 2 Device 1    polyethylene glycol (PEG 3350) 17 g packet Take 17 g by mouth.      Sennosides 17.2 MG TABS Take 17.2 mg by mouth.      sildenafil (VIAGRA) 100 MG tablet Use 1/2 to 1 pill as needed for sexual activity 30 tablet 11    thin (NO BRAND SPECIFIED) lancets Use with lanceting device. To accompany: Blood Glucose Monitor Brands: per insurance. 100 each 6    vitamin D3 (CHOLECALCIFEROL) 50 mcg (2000 units) tablet Take 1 tablet by mouth daily       No current facility-administered medications for this visit.           Objective    BP (!) 143/85   Pulse 82   Temp 97  F (36.1  C) (Temporal)   Resp 20   Wt 135.6 kg (299 lb)   SpO2 97%   BMI 44.29 kg/m    Physical Exam   As noted above and including:   GEN: no facial droop, no distress, coherent  CV: HDS  Pulm: non-labored  Gait: stable, striaght  Neuro: no facial droop, comprehends speech  Results for orders placed or performed in visit on 05/05/25   Basic metabolic panel  (Ca, Cl, CO2, Creat, Gluc, K, Na, BUN)     Status: Abnormal   Result Value Ref Range    Sodium 142 135 - 145 mmol/L    Potassium 4.8 3.4 - 5.3 mmol/L    Chloride 108 94 - 109 mmol/L    Carbon Dioxide (CO2) 27 20 - 32 mmol/L    Anion Gap 7 3 - 14 mmol/L    Urea Nitrogen 12 7 - 30 mg/dL    Creatinine 1.30 (H) 0.66 - 1.25 mg/dL    GFR Estimate 59 (L) >60 mL/min/1.73m2    Calcium 9.4 8.5 - 10.1 mg/dL    Glucose 113 (H) 70 - 99 mg/dL   CBC with platelets and differential     Status: Abnormal   Result Value Ref Range    WBC Count 3.8 (L) 4.0 - 11.0 10e3/uL    RBC Count 4.13 (L) 4.40 - 5.90 10e6/uL    Hemoglobin 13.1 (L) 13.3 - 17.7 g/dL    Hematocrit 39.9 (L) 40.0 - 53.0 %    MCV 97 78 - 100 fL    MCH 31.7 26.5 - 33.0 pg    MCHC 32.8 31.5 - 36.5 g/dL    RDW 13.8  10.0 - 15.0 %    Platelet Count 110 (L) 150 - 450 10e3/uL    % Neutrophils 68 %    % Lymphocytes 15 %    % Monocytes 13 %    % Eosinophils 4 %    % Basophils 0 %    % Immature Granulocytes 0 %    Absolute Neutrophils 2.6 1.6 - 8.3 10e3/uL    Absolute Lymphocytes 0.6 (L) 0.8 - 5.3 10e3/uL    Absolute Monocytes 0.5 0.0 - 1.3 10e3/uL    Absolute Eosinophils 0.1 0.0 - 0.7 10e3/uL    Absolute Basophils 0.0 0.0 - 0.2 10e3/uL    Absolute Immature Granulocytes 0.0 <=0.4 10e3/uL   CBC with platelets and differential     Status: Abnormal    Narrative    The following orders were created for panel order CBC with platelets and differential.  Procedure                               Abnormality         Status                     ---------                               -----------         ------                     CBC with platelets and ...[[id number]]  Abnormal            Final result                 Please view results for these tests on the individual orders.                     The use of Dragon/TalkPlusation services may have been used to construct the content in this note; any grammatical or spelling errors are non-intentional. Please contact the author of this note directly if you are in need of any clarification.

## 2025-05-05 NOTE — PATIENT INSTRUCTIONS
1030 Appointment tomorrow at [facility] ( 1st floor in the primary care wing)

## 2025-05-06 ENCOUNTER — OFFICE VISIT (OUTPATIENT)
Dept: PEDIATRICS | Facility: CLINIC | Age: 70
End: 2025-05-06
Payer: COMMERCIAL

## 2025-05-06 ENCOUNTER — ANCILLARY PROCEDURE (OUTPATIENT)
Dept: MRI IMAGING | Facility: CLINIC | Age: 70
End: 2025-05-06
Attending: INTERNAL MEDICINE
Payer: COMMERCIAL

## 2025-05-06 VITALS
WEIGHT: 299 LBS | BODY MASS INDEX: 44.28 KG/M2 | HEART RATE: 79 BPM | SYSTOLIC BLOOD PRESSURE: 151 MMHG | HEIGHT: 69 IN | RESPIRATION RATE: 18 BRPM | TEMPERATURE: 97.4 F | OXYGEN SATURATION: 99 % | DIASTOLIC BLOOD PRESSURE: 87 MMHG

## 2025-05-06 DIAGNOSIS — R29.90 ALTERATION IN NEUROLOGIC FUNCTION: Primary | ICD-10-CM

## 2025-05-06 DIAGNOSIS — R29.90 ALTERATION IN NEUROLOGIC FUNCTION: ICD-10-CM

## 2025-05-06 DIAGNOSIS — M54.50 LOW BACK PAIN, UNSPECIFIED BACK PAIN LATERALITY, UNSPECIFIED CHRONICITY, UNSPECIFIED WHETHER SCIATICA PRESENT: ICD-10-CM

## 2025-05-06 PROCEDURE — A9585 GADOBUTROL INJECTION: HCPCS | Performed by: INTERNAL MEDICINE

## 2025-05-06 PROCEDURE — 3077F SYST BP >= 140 MM HG: CPT | Performed by: INTERNAL MEDICINE

## 2025-05-06 PROCEDURE — 3079F DIAST BP 80-89 MM HG: CPT | Performed by: INTERNAL MEDICINE

## 2025-05-06 PROCEDURE — 99215 OFFICE O/P EST HI 40 MIN: CPT | Performed by: INTERNAL MEDICINE

## 2025-05-06 PROCEDURE — 70553 MRI BRAIN STEM W/O & W/DYE: CPT

## 2025-05-06 PROCEDURE — 255N000002 HC RX 255 OP 636: Performed by: INTERNAL MEDICINE

## 2025-05-06 RX ORDER — TIZANIDINE 2 MG/1
2 TABLET ORAL 3 TIMES DAILY PRN
Qty: 20 TABLET | Refills: 0 | Status: SHIPPED | OUTPATIENT
Start: 2025-05-06

## 2025-05-06 RX ORDER — GADOBUTROL 604.72 MG/ML
10 INJECTION INTRAVENOUS ONCE
Status: COMPLETED | OUTPATIENT
Start: 2025-05-06 | End: 2025-05-06

## 2025-05-06 RX ADMIN — GADOBUTROL 10 ML: 604.72 INJECTION INTRAVENOUS at 12:34

## 2025-05-06 NOTE — PROGRESS NOTES
"Acute and Diagnostic Services Clinic Visit    Assessment & Plan     Alteration in neurologic function  71 yo with hx of afib on eliquis and CVA 1 year ago due to likely carotid thrombus with good recovery after rehab.  Presents today with 4 days of increased balance difficulty, word finding difficulty, dry mouth, \"garbled\" speech and blurry vision. Started on Flexeril on 4/29 for lower back muscle spasm after fall in shower. Exam is significant for lack of orientation to place and time briefly and unsteady gait.  No focal neurological deficits noted on exam today.     Compilation of neurological symptoms including unsteady gait, speech abnormalities, cognitive changes (including short term memory changes and confusion), suggest multifocal brain involvement.  However, given afib, a recurrent embolic stroke vs TIA should be ruled out. Lack of headache decreases suspicion for hemorrhage.  Vascular dementia should also be considered. Also consider anticholinergic side effects related to Flexeril.    MR Brain w & w/o contrast returned negative for acute intracranial pathology and showed mild to moderate generalized cerebral volume loss and mild chronic small vessel ischemic disease. At this time most likely diagnosis is anticholinergic side effects from Flexeril likely superimposed on baseline cognitive impairment.     Recommend discontinue Flexeril today due to anticholinergic side effects and start Tizanidine. Engaged patient and wife in discussion regarding multimodal management of acute back pain. Encouraged continuing physical therapy in addition to pain medications (Tizanidine and tylenol). Patient and wife said that massage and using tens machine have also been helpful.     Recommend follow-up with PCP to discuss chronic cognitive changes as well as continue acute lower back pain management.     - MR Brain w/o & w Contrast; Future  -tizanidine 2 mg, TID, PRN for acute muscle spasms    60 minutes were spent doing " "chart review, history and exam, documentation and further activities per the note.         Follow-up       Subjective   Demetri is a 70 year old, presenting for the following health issues:  Neurologic Problem    HPI      Over the last 4 days, pt developed unsteadiness on feet when first standing.  Having trouble remember words and wife feels his speech is more \"garbled.\"  Also developed visual changes - feels that vision is blurry when reading.  Mouth feels dry.    Had fall 3 weeks ago. Says he was drinking too much which led to the fall. Suffered a musculoskeletal strain in his lower back due to the fall. He was prescribed Flexeril on 4/29 to help manage muscle spasms associated with this.     History of stroke last September after a period of stopping his Eliquis. Did aggressive therapy after that and has not noted residual neurological deficits. He reports no missed doses of his Eliquis.    No headache, no fever or chills, no chest pain/sob, no nausea/vomiting/diarrhea.    Evaluation of Neurologic Symptoms  Onset/Duration: Friday  Description:  Weakness/location: Yes  Numbness/tingling: Tingling in left hand, comes and goes  Headaches: no  History of migraines: no  Other pain: no  Dizziness: YES  Visual changes: YES  Speech changes: YES  Memory changes: YES  Personality changes: no           Loss or change of consciousness: no  Progression of symptoms waxing and waning  Accompanying signs and symptoms:  Fever: no  Stiff neck: no  Neck or upper back pain:  no  ENT or URI symptoms: no           Nausea or vomiting: no  History    Head or other trauma: no            Prior evaluation: YES- previous stroke in September  Precipitating or Alleviating factors:           Things that improve symptoms:  None           Things that worsen symptoms: None  Therapies tried and outcome: None      All other systems on a 10-point review are negative, unless otherwise noted in HPI        Objective    BP (!) 151/87 (BP Location: Right " "arm, Patient Position: Sitting, Cuff Size: Adult Large)   Pulse 79   Temp 97.4  F (36.3  C)   Resp 18   Ht 1.75 m (5' 8.9\")   Wt 135.6 kg (299 lb)   SpO2 99%   BMI 44.28 kg/m    Body mass index is 44.28 kg/m .  Physical Exam   GENERAL: alert and no distress  Eyes: eyes intermittently weeping, conjunctiva mildly erythematous, mild crusting bilaterally around eyelashes  NECK: no adenopathy, no asymmetry, masses, or scars  RESP: lungs clear to auscultation - no rales, rhonchi or wheezes  CV: regular rate and rhythm, normal S1 S2, no S3 or S4, no murmur, click or rub, no peripheral edema  MS: significant paraspinal pain in right lower back with flexion of right hip, no edema  NEURO:   Abnormal mental status - states that month is April, year is 2024, and day of the week is Wednesday, says we are at the hospital.speech slightly lower volume, cranial nerves 2-12 intact, correctly recalls 3/3 words after 5 minutes,   Normal strength and tone, fasciculations absent, tremor absent, sensory exam normal  gait abnormal: patient unsteady on feet for about 30 seconds after standing, wide-based gait, unable to stand on tip toes or heels,   normal rapid alternating movements, finger to nose and shin to heel testing normal          Hillary Santiago, MS3  Medical Student    Signed Electronically by: Myranda Perez MD    "

## 2025-05-13 ENCOUNTER — THERAPY VISIT (OUTPATIENT)
Dept: PHYSICAL THERAPY | Facility: CLINIC | Age: 70
End: 2025-05-13
Payer: COMMERCIAL

## 2025-05-13 DIAGNOSIS — M54.50 ACUTE BILATERAL LOW BACK PAIN WITHOUT SCIATICA: Primary | ICD-10-CM

## 2025-05-13 PROCEDURE — 97140 MANUAL THERAPY 1/> REGIONS: CPT | Mod: GP | Performed by: PHYSICAL THERAPIST

## 2025-05-13 PROCEDURE — 97110 THERAPEUTIC EXERCISES: CPT | Mod: GP | Performed by: PHYSICAL THERAPIST

## 2025-05-20 ENCOUNTER — APPOINTMENT (OUTPATIENT)
Dept: CT IMAGING | Facility: CLINIC | Age: 70
End: 2025-05-20
Attending: EMERGENCY MEDICINE
Payer: COMMERCIAL

## 2025-05-20 ENCOUNTER — HOSPITAL ENCOUNTER (EMERGENCY)
Facility: CLINIC | Age: 70
Discharge: HOME | End: 2025-05-21
Attending: EMERGENCY MEDICINE
Payer: COMMERCIAL

## 2025-05-20 ENCOUNTER — VIRTUAL VISIT (OUTPATIENT)
Dept: URGENT CARE | Facility: CLINIC | Age: 70
End: 2025-05-20
Payer: COMMERCIAL

## 2025-05-20 DIAGNOSIS — R53.1 GENERALIZED WEAKNESS: ICD-10-CM

## 2025-05-20 DIAGNOSIS — M62.81 GENERALIZED MUSCLE WEAKNESS: Primary | ICD-10-CM

## 2025-05-20 DIAGNOSIS — S32.020A CLOSED COMPRESSION FRACTURE OF L2 LUMBAR VERTEBRA, INITIAL ENCOUNTER (H): ICD-10-CM

## 2025-05-20 DIAGNOSIS — R63.4 UNINTENTIONAL WEIGHT LOSS: ICD-10-CM

## 2025-05-20 DIAGNOSIS — M54.50 ACUTE BILATERAL LOW BACK PAIN WITHOUT SCIATICA: ICD-10-CM

## 2025-05-20 LAB
ALBUMIN SERPL BCG-MCNC: 3.5 G/DL (ref 3.5–5.2)
ALBUMIN UR-MCNC: NEGATIVE MG/DL
ALP SERPL-CCNC: 122 U/L (ref 40–150)
ALT SERPL W P-5'-P-CCNC: 26 U/L (ref 0–70)
ANION GAP SERPL CALCULATED.3IONS-SCNC: 11 MMOL/L (ref 7–15)
APPEARANCE UR: CLEAR
AST SERPL W P-5'-P-CCNC: 48 U/L (ref 0–45)
BASOPHILS # BLD AUTO: 0 10E3/UL (ref 0–0.2)
BASOPHILS NFR BLD AUTO: 0 %
BILIRUB SERPL-MCNC: 0.9 MG/DL
BILIRUB UR QL STRIP: NEGATIVE
BUN SERPL-MCNC: 17.5 MG/DL (ref 8–23)
CALCIUM SERPL-MCNC: 9.3 MG/DL (ref 8.8–10.4)
CHLORIDE SERPL-SCNC: 100 MMOL/L (ref 98–107)
COLOR UR AUTO: ABNORMAL
CREAT SERPL-MCNC: 1.03 MG/DL (ref 0.67–1.17)
EGFRCR SERPLBLD CKD-EPI 2021: 78 ML/MIN/1.73M2
EOSINOPHIL # BLD AUTO: 0.2 10E3/UL (ref 0–0.7)
EOSINOPHIL NFR BLD AUTO: 3 %
ERYTHROCYTE [DISTWIDTH] IN BLOOD BY AUTOMATED COUNT: 13.4 % (ref 10–15)
GLUCOSE SERPL-MCNC: 126 MG/DL (ref 70–99)
GLUCOSE UR STRIP-MCNC: NEGATIVE MG/DL
HCO3 SERPL-SCNC: 25 MMOL/L (ref 22–29)
HCT VFR BLD AUTO: 42.7 % (ref 40–53)
HGB BLD-MCNC: 14.9 G/DL (ref 13.3–17.7)
HGB UR QL STRIP: NEGATIVE
IMM GRANULOCYTES # BLD: 0 10E3/UL
IMM GRANULOCYTES NFR BLD: 0 %
KETONES UR STRIP-MCNC: 20 MG/DL
LEUKOCYTE ESTERASE UR QL STRIP: NEGATIVE
LYMPHOCYTES # BLD AUTO: 1.2 10E3/UL (ref 0.8–5.3)
LYMPHOCYTES NFR BLD AUTO: 21 %
MAGNESIUM SERPL-MCNC: 2.2 MG/DL (ref 1.7–2.3)
MCH RBC QN AUTO: 31.8 PG (ref 26.5–33)
MCHC RBC AUTO-ENTMCNC: 34.9 G/DL (ref 31.5–36.5)
MCV RBC AUTO: 91 FL (ref 78–100)
MONOCYTES # BLD AUTO: 0.9 10E3/UL (ref 0–1.3)
MONOCYTES NFR BLD AUTO: 15 %
NEUTROPHILS # BLD AUTO: 3.4 10E3/UL (ref 1.6–8.3)
NEUTROPHILS NFR BLD AUTO: 60 %
NITRATE UR QL: NEGATIVE
NRBC # BLD AUTO: 0 10E3/UL
NRBC BLD AUTO-RTO: 0 /100
PH UR STRIP: 5.5 [PH] (ref 5–7)
PLATELET # BLD AUTO: 144 10E3/UL (ref 150–450)
POTASSIUM SERPL-SCNC: 4.2 MMOL/L (ref 3.4–5.3)
PROT SERPL-MCNC: 7.1 G/DL (ref 6.4–8.3)
RBC # BLD AUTO: 4.69 10E6/UL (ref 4.4–5.9)
RBC URINE: 0 /HPF
SODIUM SERPL-SCNC: 136 MMOL/L (ref 135–145)
SP GR UR STRIP: 1.03 (ref 1–1.03)
SQUAMOUS EPITHELIAL: <1 /HPF
TROPONIN T SERPL HS-MCNC: 22 NG/L
TROPONIN T SERPL HS-MCNC: 22 NG/L
TSH SERPL DL<=0.005 MIU/L-ACNC: 1.15 UIU/ML (ref 0.3–4.2)
UROBILINOGEN UR STRIP-MCNC: NORMAL MG/DL
WBC # BLD AUTO: 5.7 10E3/UL (ref 4–11)
WBC URINE: <1 /HPF

## 2025-05-20 PROCEDURE — 84443 ASSAY THYROID STIM HORMONE: CPT | Performed by: EMERGENCY MEDICINE

## 2025-05-20 PROCEDURE — 250N000011 HC RX IP 250 OP 636: Performed by: EMERGENCY MEDICINE

## 2025-05-20 PROCEDURE — 74177 CT ABD & PELVIS W/CONTRAST: CPT

## 2025-05-20 PROCEDURE — 81003 URINALYSIS AUTO W/O SCOPE: CPT | Performed by: EMERGENCY MEDICINE

## 2025-05-20 PROCEDURE — 93010 ELECTROCARDIOGRAM REPORT: CPT | Performed by: EMERGENCY MEDICINE

## 2025-05-20 PROCEDURE — 36415 COLL VENOUS BLD VENIPUNCTURE: CPT | Performed by: EMERGENCY MEDICINE

## 2025-05-20 PROCEDURE — 85014 HEMATOCRIT: CPT | Performed by: EMERGENCY MEDICINE

## 2025-05-20 PROCEDURE — 83735 ASSAY OF MAGNESIUM: CPT | Performed by: EMERGENCY MEDICINE

## 2025-05-20 PROCEDURE — 250N000013 HC RX MED GY IP 250 OP 250 PS 637: Performed by: EMERGENCY MEDICINE

## 2025-05-20 PROCEDURE — 74177 CT ABD & PELVIS W/CONTRAST: CPT | Mod: 26 | Performed by: RADIOLOGY

## 2025-05-20 PROCEDURE — 99285 EMERGENCY DEPT VISIT HI MDM: CPT | Mod: 25 | Performed by: EMERGENCY MEDICINE

## 2025-05-20 PROCEDURE — 93005 ELECTROCARDIOGRAM TRACING: CPT | Performed by: EMERGENCY MEDICINE

## 2025-05-20 PROCEDURE — 82435 ASSAY OF BLOOD CHLORIDE: CPT | Performed by: EMERGENCY MEDICINE

## 2025-05-20 PROCEDURE — 999N000104 CT LUMBAR SPINE RECONSTRUCTED

## 2025-05-20 PROCEDURE — 72131 CT LUMBAR SPINE W/O DYE: CPT | Mod: 26 | Performed by: RADIOLOGY

## 2025-05-20 PROCEDURE — 99284 EMERGENCY DEPT VISIT MOD MDM: CPT | Performed by: EMERGENCY MEDICINE

## 2025-05-20 PROCEDURE — 99207 PR NO CHARGE LOS: CPT

## 2025-05-20 PROCEDURE — 84484 ASSAY OF TROPONIN QUANT: CPT | Performed by: EMERGENCY MEDICINE

## 2025-05-20 RX ORDER — OXYCODONE HYDROCHLORIDE 5 MG/1
5 TABLET ORAL EVERY 6 HOURS PRN
Qty: 18 TABLET | Refills: 0 | Status: SHIPPED | OUTPATIENT
Start: 2025-05-20 | End: 2025-05-29

## 2025-05-20 RX ORDER — IOPAMIDOL 755 MG/ML
135 INJECTION, SOLUTION INTRAVASCULAR ONCE
Status: COMPLETED | OUTPATIENT
Start: 2025-05-20 | End: 2025-05-20

## 2025-05-20 RX ORDER — OXYCODONE HYDROCHLORIDE 5 MG/1
5 TABLET ORAL ONCE
Refills: 0 | Status: COMPLETED | OUTPATIENT
Start: 2025-05-20 | End: 2025-05-20

## 2025-05-20 RX ADMIN — OXYCODONE HYDROCHLORIDE 5 MG: 5 TABLET ORAL at 22:52

## 2025-05-20 RX ADMIN — IOPAMIDOL 135 ML: 755 INJECTION, SOLUTION INTRAVENOUS at 20:26

## 2025-05-20 RX ADMIN — OXYCODONE HYDROCHLORIDE 5 MG: 5 TABLET ORAL at 19:07

## 2025-05-20 ASSESSMENT — ACTIVITIES OF DAILY LIVING (ADL)
ADLS_ACUITY_SCORE: 41

## 2025-05-20 ASSESSMENT — COLUMBIA-SUICIDE SEVERITY RATING SCALE - C-SSRS
6. HAVE YOU EVER DONE ANYTHING, STARTED TO DO ANYTHING, OR PREPARED TO DO ANYTHING TO END YOUR LIFE?: NO
2. HAVE YOU ACTUALLY HAD ANY THOUGHTS OF KILLING YOURSELF IN THE PAST MONTH?: NO
1. IN THE PAST MONTH, HAVE YOU WISHED YOU WERE DEAD OR WISHED YOU COULD GO TO SLEEP AND NOT WAKE UP?: NO

## 2025-05-20 NOTE — PROGRESS NOTES
Hx back pain recently after a fall. Cyclobenzaprine caused anticholinergic symptoms and had to be discontinued.  Is now on tizanidine but not managing his pain.  When he lays flat in bed pain is a 1 out of 10, when he tries to move pain is very sharp and shoots up to 10 out of 10.  Has been bedridden for the past 7 days.  Wife states he has lost 20 pounds in the past 3 weeks, and has been experiencing generalized weakness.  Has a history of CVA on Eliquis, cardiomyopathy hypertension and type 2 diabetes    Was sent to the ER for increased weakness, unmanageable back pain 10 out of 10 when ambulatory, and 20 pound weight loss in the last 3 weeks.

## 2025-05-20 NOTE — ED TRIAGE NOTES
Triage Assessment & Note:    BP (!) 142/83   Pulse 79   Temp 97.5  F (36.4  C) (Oral)   Resp 16   SpO2 100%       Patient presents with: Pt comes via wc with reports of weight loss, back pain, dizziness, and generalized weakness. Pt tried to get on scale in triage and pt reported feeling dizzy and unable to get up on scale. No reports of fever, cough, SOB, CP, or travel.     Home Treatments/Remedies: Home medications    Febrile / Afebrile: afebrile    Duration of C/o: 4+ wks    Julia Harrington RN  May 20, 2025
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
## 2025-05-21 ENCOUNTER — PATIENT OUTREACH (OUTPATIENT)
Dept: CARE COORDINATION | Facility: CLINIC | Age: 70
End: 2025-05-21
Payer: COMMERCIAL

## 2025-05-21 VITALS
SYSTOLIC BLOOD PRESSURE: 117 MMHG | OXYGEN SATURATION: 98 % | TEMPERATURE: 97.8 F | HEART RATE: 79 BPM | DIASTOLIC BLOOD PRESSURE: 80 MMHG | RESPIRATION RATE: 16 BRPM

## 2025-05-21 LAB
ATRIAL RATE - MUSE: NORMAL BPM
DIASTOLIC BLOOD PRESSURE - MUSE: NORMAL MMHG
INTERPRETATION ECG - MUSE: NORMAL
P AXIS - MUSE: NORMAL DEGREES
PR INTERVAL - MUSE: NORMAL MS
QRS DURATION - MUSE: 70 MS
QT - MUSE: 374 MS
QTC - MUSE: 434 MS
R AXIS - MUSE: -11 DEGREES
SYSTOLIC BLOOD PRESSURE - MUSE: NORMAL MMHG
T AXIS - MUSE: 1 DEGREES
VENTRICULAR RATE- MUSE: 81 BPM

## 2025-05-21 NOTE — DISCHARGE INSTRUCTIONS
TODAY'S VISIT:  You were seen today for back pain, weakness, fall   -   - If you had any labs or imaging/radiology tests performed today, you should also discuss these tests with your usual provider.     FOLLOW-UP:  Please make an appointment to follow up with:  - Your Primary Care Provider. If you do not have a PCP, please call the Primary Care Center (phone: [phone] for an appointment  - Endocrinology bone health clinic for evaluation for osteoporosis    - Have your provider review the results from today's visit with you again to make sure no further follow-up or additional testing is needed based on those results.     PRESCRIPTIONS / MEDICATIONS:  - Oxycodone as needed for pain control  - You can also try over-the-counter lidocaine patches    OTHER INSTRUCTIONS:  - Applying heat to the painful area may help alleviate pain by helping with muscle relaxation. For this you can use a heating pad or warm bath/shower. If you do not have a heating pad, you can make a warm compress using an old sock filled with uncooked rice placed in the microwave for 1-2 minutes.       RETURN TO THE EMERGENCY DEPARTMENT  Return to the Emergency Department at any time for any new or worsening symptoms or any concerns.

## 2025-05-29 ENCOUNTER — MYC REFILL (OUTPATIENT)
Dept: INTERNAL MEDICINE | Facility: CLINIC | Age: 70
End: 2025-05-29
Payer: COMMERCIAL

## 2025-05-29 DIAGNOSIS — S32.020S COMPRESSION FRACTURE OF L2 VERTEBRA, SEQUELA: Primary | ICD-10-CM

## 2025-05-29 RX ORDER — OXYCODONE HYDROCHLORIDE 5 MG/1
5 TABLET ORAL EVERY 6 HOURS PRN
Qty: 18 TABLET | Refills: 0 | Status: SHIPPED | OUTPATIENT
Start: 2025-05-29

## 2025-05-29 NOTE — TELEPHONE ENCOUNTER
Chart review of alternative refill [facility] encounter:    rob GALVAN [place] [facility] (supporting Jeff Martinez MD)        5/29/25  3:36 PM  Demetri is improving now that we have the correct diagnosis.  However one additional refill should be helpful in his healing.  The pain is not as severe, but still an impediment to getting out of bed.  We have been getting him up at least once a day.  He has been taking over the counter pain meds.  Iza Booth

## 2025-06-07 DIAGNOSIS — E11.51 TYPE 2 DIABETES MELLITUS WITH DIABETIC PERIPHERAL ANGIOPATHY WITHOUT GANGRENE, WITHOUT LONG-TERM CURRENT USE OF INSULIN (H): ICD-10-CM

## 2025-06-09 RX ORDER — GLIMEPIRIDE 4 MG/1
4 TABLET ORAL
Qty: 30 TABLET | Refills: 0 | Status: SHIPPED | OUTPATIENT
Start: 2025-06-09 | End: 2025-06-12

## 2025-06-12 ENCOUNTER — RESULTS FOLLOW-UP (OUTPATIENT)
Dept: INTERNAL MEDICINE | Facility: CLINIC | Age: 70
End: 2025-06-12

## 2025-06-12 ENCOUNTER — OFFICE VISIT (OUTPATIENT)
Dept: INTERNAL MEDICINE | Facility: CLINIC | Age: 70
End: 2025-06-12
Payer: COMMERCIAL

## 2025-06-12 VITALS
RESPIRATION RATE: 22 BRPM | TEMPERATURE: 97.1 F | DIASTOLIC BLOOD PRESSURE: 68 MMHG | OXYGEN SATURATION: 98 % | HEART RATE: 72 BPM | SYSTOLIC BLOOD PRESSURE: 116 MMHG | WEIGHT: 272.3 LBS | HEIGHT: 68 IN | BODY MASS INDEX: 41.27 KG/M2

## 2025-06-12 DIAGNOSIS — S32.020A WEDGE COMPRESSION FRACTURE OF SECOND LUMBAR VERTEBRA, INITIAL ENCOUNTER FOR CLOSED FRACTURE (H): ICD-10-CM

## 2025-06-12 DIAGNOSIS — S32.020S COMPRESSION FRACTURE OF L2 VERTEBRA, SEQUELA: ICD-10-CM

## 2025-06-12 DIAGNOSIS — S32.020S COMPRESSION FRACTURE OF L2 VERTEBRA, SEQUELA: Primary | ICD-10-CM

## 2025-06-12 DIAGNOSIS — I10 HYPERTENSION GOAL BP (BLOOD PRESSURE) < 130/80: ICD-10-CM

## 2025-06-12 DIAGNOSIS — E66.01 MORBID OBESITY (H): ICD-10-CM

## 2025-06-12 DIAGNOSIS — E78.2 MIXED HYPERLIPIDEMIA: ICD-10-CM

## 2025-06-12 DIAGNOSIS — E11.51 TYPE 2 DIABETES MELLITUS WITH DIABETIC PERIPHERAL ANGIOPATHY WITHOUT GANGRENE, WITHOUT LONG-TERM CURRENT USE OF INSULIN (H): Primary | ICD-10-CM

## 2025-06-12 LAB
EST. AVERAGE GLUCOSE BLD GHB EST-MCNC: 114 MG/DL
HBA1C MFR BLD: 5.6 % (ref 0–5.6)

## 2025-06-12 RX ORDER — METOPROLOL TARTRATE 100 MG/1
100 TABLET ORAL 2 TIMES DAILY
COMMUNITY
Start: 2025-06-12

## 2025-06-12 RX ORDER — OXYCODONE HYDROCHLORIDE 5 MG/1
5 TABLET ORAL EVERY 6 HOURS PRN
Qty: 8 TABLET | Refills: 0 | Status: SHIPPED | OUTPATIENT
Start: 2025-06-12

## 2025-06-12 RX ORDER — GAUZE BANDAGE 2" X 2"
100 BANDAGE TOPICAL DAILY
COMMUNITY
Start: 2024-09-04

## 2025-06-12 ASSESSMENT — PAIN SCALES - GENERAL: PAINLEVEL_OUTOF10: MODERATE PAIN (6)

## 2025-06-12 NOTE — PROGRESS NOTES
Assessment & Plan     (E11.51) Type 2 diabetes mellitus with diabetic peripheral angiopathy without gangrene, without long-term current use of insulin (H)  (primary encounter diagnosis)  Comment: with significant weight loss over the last year, he has been able to come off of his oral medications.  Plan: HEMOGLOBIN A1C        Recheck today  Eye exam up to date.  See again in 6 months    (S32.020S) Compression fracture of L2 vertebra, sequela  Comment: recent diagnosis, fall, possibly related to etoh abuse. Diagnosed in ED visit 5/20/2025, improving pain overall, ambulatory, uses occasional oxycodone on top of tylenol, heat, back brace and has a TENS unit at home as well.  Plan: DX Bone Density, oxyCODONE (ROXICODONE) 5 MG         tablet        Recommended dexa, work up though my plan is to start anti-resorptive option. 8 tabs of oxycodone sent but do not plan on refilling after this one.    (E66.01) Morbid obesity (H)  Comment: slow improvement over the last year  Plan: will continue to manage with lifestyle modifications    (E78.2) Mixed hyperlipidemia  Comment: on statin  Plan: Lipid panel reflex to direct LDL Non-fasting        Will plan to continue on previous medication without changes     (I10) Hypertension goal BP (blood pressure) < 130/80  Comment: controlled  Plan: metoprolol tartrate (LOPRESSOR) 100 MG tablet        Will plan to continue on previous medication without changes     The longitudinal plan of care for the diagnosis(es)/condition(s) as documented were addressed during this visit. Due to the added complexity in care, I will continue to support Demetri in the subsequent management and with ongoing continuity of care.                 Subjective   Demetri is a 70 year old, presenting for the following health issues:  RECHECK (Pt her for 6 month follow up)      6/12/2025     1:34 PM   Additional Questions   Roomed by Ann MARTINEZ RN   Accompanied by wife Iza         6/12/2025     1:34 PM   Patient  "Reported Additional Medications   Patient reports taking the following new medications none     History of Present Illness       Back Pain:  He presents for follow up of back pain. Patient's back pain is a new problem.    Original cause of back pain: a fall  First noticed back pain: more than 1 month ago  Patient feels back pain: dailyLocation of back pain:  Right lower back  Description of back pain: cramping, gnawing and sharp  Back pain spreads: nowhere    Since patient first noticed back pain, pain is: gradually improving  Does back pain interfere with his job:  Not applicable  On a scale of 1-10 (10 being the worst), patient describes pain as:  5  What makes back pain worse: twisting and other   Acupuncture: not tried  Acetaminophen: helpful  Activity or exercise: not tried  Chiropractor:  Not tried  Cold: not tried  Heat: not tried  Massage: helpful  NSAIDS: not helpful  Opioids: helpful  Physical Therapy: not helpful  Rest: helpful  Steroid Injection: not tried  Stretching: not tried  Surgery: not tried  TENS unit: helpful  Topical pain relievers: helpful  Other healthcare providers patient is seeing for back pain: None   He is taking medications regularly.                      Objective    /68 (BP Location: Left arm, Patient Position: Sitting, Cuff Size: Adult Regular)   Pulse 72   Temp 97.1  F (36.2  C) (Temporal)   Resp 22   Ht 1.727 m (5' 8\")   Wt 123.5 kg (272 lb 4.8 oz)   SpO2 98%   BMI 41.40 kg/m    Body mass index is 41.4 kg/m .  Physical Exam               Signed Electronically by: Jeff Martinez MD    "

## 2025-06-17 ENCOUNTER — THERAPY VISIT (OUTPATIENT)
Dept: PHYSICAL THERAPY | Facility: CLINIC | Age: 70
End: 2025-06-17
Payer: COMMERCIAL

## 2025-06-17 DIAGNOSIS — M54.50 ACUTE BILATERAL LOW BACK PAIN WITHOUT SCIATICA: Primary | ICD-10-CM

## 2025-06-17 PROCEDURE — 97161 PT EVAL LOW COMPLEX 20 MIN: CPT | Mod: GP | Performed by: PHYSICAL THERAPIST

## 2025-06-17 PROCEDURE — 97110 THERAPEUTIC EXERCISES: CPT | Mod: GP | Performed by: PHYSICAL THERAPIST

## 2025-06-17 NOTE — PROGRESS NOTES
"PHYSICAL THERAPY EVALUATION  Type of Visit: Evaluation       Fall Risk Screen:  Have you fallen 2 or more times in the past year?: Yes  Have you fallen and had an injury in the past year?: Yes  Is patient receiving Physical Therapy Services?: Referral initiated    Subjective         Presenting condition or subjective complaint: Compression fracture 2nd lumbar    Per PCP note 6/12/2025: Compression fracture of L2 vertebra, sequela  \"Comment: recent diagnosis, fall, possibly related to etoh abuse. Diagnosed in ED visit 5/20/2025, improving pain overall, ambulatory, uses occasional oxycodone on top of tylenol, heat, back brace and has a TENS unit at home as well.\"       Date of onset: 04/30/25 (MD order)    Relevant medical history: Diabetes; Heart problems; High blood pressure; Numbness or tingling in perianal area; Overweight; Sleep disorder like apnea   Dates & types of surgery: Naval hernia    Prior diagnostic imaging/testing results: CT scan       IMPRESSION:  1.  L2 anterior compression deformity with approximately 25% vertebral body height loss, new from prior.  2.  Unchanged chronic L1 anterior compression deformity.  3.  Moderate to potentially severe spinal canal stenosis L4-L5.  4.  Severe foraminal stenosis bilaterally L4-L5 and left at L5-S1.    Prior therapy history for the same diagnosis, illness or injury: No      Prior Level of Function  Transfers: Independent  Ambulation: Assistive equipment  ADL: Assistive person  IADL: Finances, Housekeeping, Laundry, Meal preparation    Living Environment  Social support: With a significant other or spouse   Type of home: Apartment/condo   Stairs to enter the home: Yes 12 Is there a railing: Yes     Ramp: No   Stairs inside the home: No       Help at home: Self Cares (home health aide/personal care attendant, family, etc)  Equipment owned: Straight Cane; Walker with wheels; Grab bars     Employment: No    Hobbies/Interests: Reading computer games    Patient goals " for therapy: Get out socially    Pain assessment: Location: low back central L2-L3 /Ratin-8/10     Objective   LUMBAR SPINE EVALUATION  PAIN: Pain is Exacerbated By: bending, twisting, reaching, bed mobility   Pain is Relieved By: NSAIDs, otc medications, and rest    POSTURE: Standing Posture: Rounded shoulders    ROM:   (Degrees) Left AROM Left PROM  Right AROM Right PROM   Lumbar Side glide Nil loss Nil loss   Lumbar Flexion Not assessed   Lumbar Extension Mod loss   Pain:   End feel:     STRENGTH:     Pain: - none + mild ++ moderate +++ severe  Strength Scale: 0-5/5 Left Right   Hip Flexion 5 5   Hip Extension 3+ 3+   Hip Abduction 3+ 3+   Knee Flexion 4+ 4+   Knee Extension 4+ 4+       MYOTOMES: WNL  DTR S: WNL  CORD SIGNS: WNL  DERMATOMES: WNL    FLEXIBILITY: decreased hamstring flexibility       FUNCTIONAL TESTS:   sit <> stand Anterior knee translation, Knee valgus, Hip internal rotation, Improper use of glutes/hips, and requires bilateral hands for push off and min A for balance once standing  SLS: not able to SLS without hand on table   PALPATION: TTP along bilateral QL   SPINAL SEGMENTAL CONCLUSIONS: will assess in future as needed       Assessment & Plan   CLINICAL IMPRESSIONS  Medical Diagnosis: low back pain    Treatment Diagnosis: low back pain   Impression/Assessment: Patient is a 70 year old male with low back pain complaints.  The following significant findings have been identified: Pain, Decreased ROM/flexibility, Decreased joint mobility, Decreased strength, Impaired balance, Decreased proprioception, and Impaired sensation. These impairments interfere with their ability to perform self care tasks, recreational activities, household chores, household mobility, and community mobility as compared to previous level of function.     Clinical Decision Making (Complexity):  Clinical Presentation: Stable/Uncomplicated  Clinical Presentation Rationale: based on medical and personal factors listed in  PT evaluation  Clinical Decision Making (Complexity): Low complexity    PLAN OF CARE  Treatment Interventions:  Modalities: Cryotherapy, Cupping, Dry Needling  Interventions: Gait Training, Manual Therapy, Neuromuscular Re-education, Therapeutic Activity, Therapeutic Exercise, Self-Care/Home Management    Long Term Goals     PT Goal 1  Goal Identifier: Ambulation  Goal Description: Patient will ambulate at least 8 laps in hallways with least restrictive device and 0/10 pain  Rationale: to maximize safety and independence with performance of ADLs and functional tasks;to maximize safety and independence within the community;to maximize safety and independence with self cares  Target Date: 09/09/25      Frequency of Treatment: 1x per week 4 weeks, 2x per month 2 months  Duration of Treatment: up to 84 days    Recommended Referrals to Other Professionals: none per PT  Education Assessment:   Learner/Method: Patient;No Barriers to Learning    Risks and benefits of evaluation/treatment have been explained.   Patient/Family/caregiver agrees with Plan of Care.     Evaluation Time:     PT Eval, Low Complexity Minutes ([id number]): 20     Signing Clinician: J[facility]                                                                                   OUTPATIENT PHYSICAL THERAPY      PLAN OF TREATMENT FOR OUTPATIENT REHABILITATION   Patient's Last Name, First Name, Demetri Ontiveros YOB: 1955   Provider's Name   [facility]   Medical Record No.  [id number]     Onset Date: 04/30/25 (MD order)  Start of Care Date: 06/17/25     Medical Diagnosis:  low back pain      PT Treatment Diagnosis:  low back pain Plan of Treatment  Frequency/Duration: 1x per week 4 weeks, 2x per month 2 months/ up to 84 days    Certification date from 06/17/25 to 09/09/25         See note for plan of treatment details and functional goals     Nathaly FERNANDEZ  Christopher, PT                         I CERTIFY THE NEED FOR THESE SERVICES FURNISHED UNDER        THIS PLAN OF TREATMENT AND WHILE UNDER MY CARE     (Physician attestation of this document indicates review and certification of the therapy plan).              Referring Provider:  Chato Willis    Initial Assessment  See Epic Evaluation- Start of Care Date: 06/17/25

## 2025-06-30 ENCOUNTER — ANCILLARY PROCEDURE (OUTPATIENT)
Dept: BONE DENSITY | Facility: CLINIC | Age: 70
End: 2025-06-30
Attending: INTERNAL MEDICINE
Payer: COMMERCIAL

## 2025-06-30 DIAGNOSIS — S32.020S COMPRESSION FRACTURE OF L2 VERTEBRA, SEQUELA: ICD-10-CM

## 2025-06-30 DIAGNOSIS — S32.020A WEDGE COMPRESSION FRACTURE OF SECOND LUMBAR VERTEBRA, INITIAL ENCOUNTER FOR CLOSED FRACTURE (H): ICD-10-CM

## 2025-06-30 PROCEDURE — 77080 DXA BONE DENSITY AXIAL: CPT | Performed by: INTERNAL MEDICINE

## 2025-07-08 ENCOUNTER — PATIENT OUTREACH (OUTPATIENT)
Dept: CARE COORDINATION | Facility: CLINIC | Age: 70
End: 2025-07-08
Payer: COMMERCIAL

## 2025-07-09 DIAGNOSIS — L40.9 PSORIASIS OF SCALP: ICD-10-CM

## 2025-07-10 RX ORDER — CLOBETASOL PROPIONATE 0.5 MG/G
CREAM TOPICAL
Qty: 30 G | Refills: 1 | Status: SHIPPED | OUTPATIENT
Start: 2025-07-10

## 2025-08-11 ENCOUNTER — OFFICE VISIT (OUTPATIENT)
Dept: ANESTHESIOLOGY | Facility: CLINIC | Age: 70
End: 2025-08-11
Attending: INTERNAL MEDICINE
Payer: COMMERCIAL

## 2025-08-11 VITALS — OXYGEN SATURATION: 97 % | DIASTOLIC BLOOD PRESSURE: 92 MMHG | HEART RATE: 68 BPM | SYSTOLIC BLOOD PRESSURE: 145 MMHG

## 2025-08-11 DIAGNOSIS — S32.020S COMPRESSION FRACTURE OF L2 VERTEBRA, SEQUELA: ICD-10-CM

## 2025-08-11 DIAGNOSIS — M79.18 MYOFASCIAL PAIN SYNDROME: ICD-10-CM

## 2025-08-11 DIAGNOSIS — M47.816 SPONDYLOSIS WITHOUT MYELOPATHY OR RADICULOPATHY, LUMBAR REGION: Primary | ICD-10-CM

## 2025-08-11 PROCEDURE — 3080F DIAST BP >= 90 MM HG: CPT | Performed by: STUDENT IN AN ORGANIZED HEALTH CARE EDUCATION/TRAINING PROGRAM

## 2025-08-11 PROCEDURE — 1125F AMNT PAIN NOTED PAIN PRSNT: CPT | Performed by: STUDENT IN AN ORGANIZED HEALTH CARE EDUCATION/TRAINING PROGRAM

## 2025-08-11 PROCEDURE — 3077F SYST BP >= 140 MM HG: CPT | Performed by: STUDENT IN AN ORGANIZED HEALTH CARE EDUCATION/TRAINING PROGRAM

## 2025-08-11 PROCEDURE — 99204 OFFICE O/P NEW MOD 45 MIN: CPT | Mod: GC | Performed by: STUDENT IN AN ORGANIZED HEALTH CARE EDUCATION/TRAINING PROGRAM

## 2025-08-11 RX ORDER — MELOXICAM 15 MG/1
15 TABLET ORAL DAILY
Qty: 14 TABLET | Refills: 0 | Status: SHIPPED | OUTPATIENT
Start: 2025-08-11

## 2025-08-11 ASSESSMENT — PAIN SCALES - GENERAL: PAINLEVEL_OUTOF10: MILD PAIN (2)

## 2025-08-14 ENCOUNTER — TELEPHONE (OUTPATIENT)
Dept: ANESTHESIOLOGY | Facility: CLINIC | Age: 70
End: 2025-08-14
Payer: COMMERCIAL

## 2025-08-14 PROBLEM — M47.816 SPONDYLOSIS WITHOUT MYELOPATHY OR RADICULOPATHY, LUMBAR REGION: Status: ACTIVE | Noted: 2025-08-11

## 2025-08-25 ENCOUNTER — THERAPY VISIT (OUTPATIENT)
Dept: PHYSICAL THERAPY | Facility: CLINIC | Age: 70
End: 2025-08-25
Attending: STUDENT IN AN ORGANIZED HEALTH CARE EDUCATION/TRAINING PROGRAM
Payer: COMMERCIAL

## 2025-08-25 DIAGNOSIS — M47.816 SPONDYLOSIS WITHOUT MYELOPATHY OR RADICULOPATHY, LUMBAR REGION: ICD-10-CM

## 2025-08-25 DIAGNOSIS — M79.18 MYOFASCIAL PAIN SYNDROME: ICD-10-CM

## 2025-08-25 PROCEDURE — 97161 PT EVAL LOW COMPLEX 20 MIN: CPT | Mod: GP | Performed by: PHYSICAL THERAPIST

## 2025-08-25 PROCEDURE — 97112 NEUROMUSCULAR REEDUCATION: CPT | Mod: GP | Performed by: PHYSICAL THERAPIST

## (undated) RX ORDER — ACETAMINOPHEN 325 MG/1
TABLET ORAL
Status: DISPENSED
Start: 2018-07-16

## (undated) RX ORDER — LIDOCAINE HYDROCHLORIDE 10 MG/ML
INJECTION, SOLUTION EPIDURAL; INFILTRATION; INTRACAUDAL; PERINEURAL
Status: DISPENSED
Start: 2018-07-16

## (undated) RX ORDER — VERAPAMIL HYDROCHLORIDE 2.5 MG/ML
INJECTION, SOLUTION INTRAVENOUS
Status: DISPENSED
Start: 2018-07-16

## (undated) RX ORDER — FENTANYL CITRATE 50 UG/ML
INJECTION, SOLUTION INTRAMUSCULAR; INTRAVENOUS
Status: DISPENSED
Start: 2018-07-16

## (undated) RX ORDER — FENTANYL CITRATE 50 UG/ML
INJECTION, SOLUTION INTRAMUSCULAR; INTRAVENOUS
Status: DISPENSED
Start: 2024-07-02

## (undated) RX ORDER — NITROGLYCERIN 5 MG/ML
VIAL (ML) INTRAVENOUS
Status: DISPENSED
Start: 2018-07-16

## (undated) RX ORDER — HEPARIN SODIUM 1000 [USP'U]/ML
INJECTION, SOLUTION INTRAVENOUS; SUBCUTANEOUS
Status: DISPENSED
Start: 2018-07-16